# Patient Record
Sex: MALE | Race: WHITE | NOT HISPANIC OR LATINO | Employment: FULL TIME | URBAN - METROPOLITAN AREA
[De-identification: names, ages, dates, MRNs, and addresses within clinical notes are randomized per-mention and may not be internally consistent; named-entity substitution may affect disease eponyms.]

---

## 2021-01-17 ENCOUNTER — IMMUNIZATION (OUTPATIENT)
Dept: VACCINE CLINIC | Facility: HOSPITAL | Age: 46
End: 2021-01-17

## 2021-01-17 PROCEDURE — 91300 HC SARSCOV02 VAC 30MCG/0.3ML IM: CPT | Performed by: INTERNAL MEDICINE

## 2021-01-17 PROCEDURE — 0001A: CPT | Performed by: INTERNAL MEDICINE

## 2021-02-07 ENCOUNTER — IMMUNIZATION (OUTPATIENT)
Dept: VACCINE CLINIC | Facility: HOSPITAL | Age: 46
End: 2021-02-07

## 2021-02-07 PROCEDURE — 91300 HC SARSCOV02 VAC 30MCG/0.3ML IM: CPT | Performed by: INTERNAL MEDICINE

## 2021-02-07 PROCEDURE — 0002A: CPT | Performed by: INTERNAL MEDICINE

## 2021-10-18 ENCOUNTER — PRE-ADMISSION TESTING (OUTPATIENT)
Dept: PREADMISSION TESTING | Facility: HOSPITAL | Age: 46
End: 2021-10-18

## 2021-10-18 VITALS — HEIGHT: 71 IN | WEIGHT: 239.42 LBS | BODY MASS INDEX: 33.52 KG/M2

## 2021-10-18 LAB
ANION GAP SERPL CALCULATED.3IONS-SCNC: 12 MMOL/L (ref 5–15)
APTT PPP: 27.9 SECONDS (ref 22–39)
BUN SERPL-MCNC: 11 MG/DL (ref 6–20)
BUN/CREAT SERPL: 10.2 (ref 7–25)
CALCIUM SPEC-SCNC: 9 MG/DL (ref 8.6–10.5)
CHLORIDE SERPL-SCNC: 101 MMOL/L (ref 98–107)
CO2 SERPL-SCNC: 26 MMOL/L (ref 22–29)
CREAT SERPL-MCNC: 1.08 MG/DL (ref 0.76–1.27)
DEPRECATED RDW RBC AUTO: 46.3 FL (ref 37–54)
ERYTHROCYTE [DISTWIDTH] IN BLOOD BY AUTOMATED COUNT: 18.3 % (ref 12.3–15.4)
GFR SERPL CREATININE-BSD FRML MDRD: 73 ML/MIN/1.73
GFR SERPL CREATININE-BSD FRML MDRD: 89 ML/MIN/1.73
GLUCOSE SERPL-MCNC: 128 MG/DL (ref 65–99)
HCT VFR BLD AUTO: 38.1 % (ref 37.5–51)
HGB BLD-MCNC: 11.5 G/DL (ref 13–17.7)
INR PPP: 0.98 (ref 0.85–1.16)
MCH RBC QN AUTO: 22.2 PG (ref 26.6–33)
MCHC RBC AUTO-ENTMCNC: 30.2 G/DL (ref 31.5–35.7)
MCV RBC AUTO: 73.7 FL (ref 79–97)
PLATELET # BLD AUTO: 367 10*3/MM3 (ref 140–450)
PMV BLD AUTO: 9.7 FL (ref 6–12)
POTASSIUM SERPL-SCNC: 4.6 MMOL/L (ref 3.5–5.2)
PROTHROMBIN TIME: 12.7 SECONDS (ref 11.4–14.4)
QT INTERVAL: 354 MS
QTC INTERVAL: 444 MS
RBC # BLD AUTO: 5.17 10*6/MM3 (ref 4.14–5.8)
SODIUM SERPL-SCNC: 139 MMOL/L (ref 136–145)
WBC # BLD AUTO: 9.02 10*3/MM3 (ref 3.4–10.8)

## 2021-10-18 PROCEDURE — 93005 ELECTROCARDIOGRAM TRACING: CPT

## 2021-10-18 PROCEDURE — 36415 COLL VENOUS BLD VENIPUNCTURE: CPT

## 2021-10-18 PROCEDURE — 93010 ELECTROCARDIOGRAM REPORT: CPT | Performed by: INTERNAL MEDICINE

## 2021-10-18 PROCEDURE — 85027 COMPLETE CBC AUTOMATED: CPT

## 2021-10-18 PROCEDURE — 85610 PROTHROMBIN TIME: CPT

## 2021-10-18 PROCEDURE — 80048 BASIC METABOLIC PNL TOTAL CA: CPT

## 2021-10-18 PROCEDURE — 85730 THROMBOPLASTIN TIME PARTIAL: CPT

## 2021-10-18 RX ORDER — OLMESARTAN MEDOXOMIL 20 MG/1
20 TABLET ORAL DAILY
COMMUNITY
End: 2022-09-09 | Stop reason: CLARIF

## 2021-10-18 RX ORDER — ONDANSETRON 4 MG/1
4 TABLET, FILM COATED ORAL EVERY 8 HOURS PRN
COMMUNITY
End: 2022-09-09

## 2021-10-18 RX ORDER — ALPRAZOLAM 1 MG/1
1 TABLET ORAL 2 TIMES DAILY PRN
COMMUNITY
End: 2022-09-09

## 2021-10-18 RX ORDER — AMLODIPINE BESYLATE 5 MG/1
5 TABLET ORAL DAILY
COMMUNITY
End: 2022-09-09 | Stop reason: CLARIF

## 2021-10-18 NOTE — PAT
Patient to apply Chlorhexadine wipes  to surgical area (as instructed) the night before procedure and the AM of procedure. Wipes provided.    Per Anesthesia Request, patient instructed not to take their ACE/ARB medications on the AM of surgery.    Patient viewed general PAT education video as instructed in their preoperative information received from their surgeon.  Patient stated the general PAT education video was viewed in its entirety and survey completed.  Copies of PAT general education handouts (Incentive Spirometry, Meds to Beds Program, Patient Belongings, Pre-op skin preparation instructions, Blood Glucose testing, Visitor policy, Surgery FAQ, Code H) distributed to patient if not printed. Education related to the PAT pass and skin preparation for surgery (if applicable) completed in PAT as a reinforcement to PAT education video. Patient instructed to return PAT pass provided today as well as completed skin preparation sheet (if applicable) on the day of procedure.     Additionally if patient had not viewed video yet but intended to view it at home or in our waiting area, then referred them to the handout with QR code/link provided during PAT visit.  Instructed patient to complete survey after viewing the video in its entirety.  Encouraged patient/family to read PAT general education handouts thoroughly and notify PAT staff with any questions or concerns. Patient verbalized understanding of all information and priority content.    Positive covid test result on 8-16-21 and on chart

## 2021-10-19 ENCOUNTER — ANESTHESIA EVENT (OUTPATIENT)
Dept: PERIOP | Facility: HOSPITAL | Age: 46
End: 2021-10-19

## 2021-10-20 ENCOUNTER — HOSPITAL ENCOUNTER (OUTPATIENT)
Facility: HOSPITAL | Age: 46
Setting detail: HOSPITAL OUTPATIENT SURGERY
Discharge: HOME | End: 2021-10-20
Attending: SURGERY | Admitting: SURGERY

## 2021-10-20 ENCOUNTER — APPOINTMENT (OUTPATIENT)
Dept: GENERAL RADIOLOGY | Facility: HOSPITAL | Age: 46
End: 2021-10-20

## 2021-10-20 ENCOUNTER — ANESTHESIA (OUTPATIENT)
Dept: PERIOP | Facility: HOSPITAL | Age: 46
End: 2021-10-20

## 2021-10-20 VITALS
BODY MASS INDEX: 33.46 KG/M2 | DIASTOLIC BLOOD PRESSURE: 87 MMHG | WEIGHT: 239 LBS | HEIGHT: 71 IN | HEART RATE: 82 BPM | SYSTOLIC BLOOD PRESSURE: 124 MMHG | OXYGEN SATURATION: 94 % | RESPIRATION RATE: 18 BRPM | TEMPERATURE: 97.4 F

## 2021-10-20 PROCEDURE — 25010000002 HEPARIN (PORCINE) PER 1000 UNITS: Performed by: SURGERY

## 2021-10-20 PROCEDURE — C1753 CATH, INTRAVAS ULTRASOUND: HCPCS | Performed by: SURGERY

## 2021-10-20 PROCEDURE — 0 CEFAZOLIN IN DEXTROSE 2-4 GM/100ML-% SOLUTION: Performed by: SURGERY

## 2021-10-20 PROCEDURE — 25010000002 PROPOFOL 10 MG/ML EMULSION: Performed by: NURSE ANESTHETIST, CERTIFIED REGISTERED

## 2021-10-20 PROCEDURE — 25010000003 CEFAZOLIN IN DEXTROSE 2-4 GM/100ML-% SOLUTION: Performed by: SURGERY

## 2021-10-20 PROCEDURE — C1894 INTRO/SHEATH, NON-LASER: HCPCS | Performed by: SURGERY

## 2021-10-20 PROCEDURE — C1769 GUIDE WIRE: HCPCS | Performed by: SURGERY

## 2021-10-20 PROCEDURE — 25010000002 IOPAMIDOL 61 % SOLUTION: Performed by: SURGERY

## 2021-10-20 PROCEDURE — 25010000002 FENTANYL CITRATE (PF) 50 MCG/ML SOLUTION: Performed by: NURSE ANESTHETIST, CERTIFIED REGISTERED

## 2021-10-20 PROCEDURE — C1725 CATH, TRANSLUMIN NON-LASER: HCPCS | Performed by: SURGERY

## 2021-10-20 PROCEDURE — 75820 VEIN X-RAY ARM/LEG: CPT

## 2021-10-20 PROCEDURE — C1757 CATH, THROMBECTOMY/EMBOLECT: HCPCS | Performed by: SURGERY

## 2021-10-20 PROCEDURE — 25010000002 HYDROMORPHONE PER 4 MG: Performed by: NURSE ANESTHETIST, CERTIFIED REGISTERED

## 2021-10-20 PROCEDURE — 25010000002 HEPARIN (PORCINE) PER 1000 UNITS: Performed by: NURSE ANESTHETIST, CERTIFIED REGISTERED

## 2021-10-20 PROCEDURE — 25010000002 MIDAZOLAM PER 1 MG: Performed by: ANESTHESIOLOGY

## 2021-10-20 RX ORDER — HYDROMORPHONE HYDROCHLORIDE 1 MG/ML
0.5 INJECTION, SOLUTION INTRAMUSCULAR; INTRAVENOUS; SUBCUTANEOUS
Status: DISCONTINUED | OUTPATIENT
Start: 2021-10-20 | End: 2021-10-20 | Stop reason: HOSPADM

## 2021-10-20 RX ORDER — FAMOTIDINE 10 MG/ML
20 INJECTION, SOLUTION INTRAVENOUS ONCE
Status: DISCONTINUED | OUTPATIENT
Start: 2021-10-20 | End: 2021-10-20 | Stop reason: HOSPADM

## 2021-10-20 RX ORDER — SODIUM CHLORIDE 0.9 % (FLUSH) 0.9 %
10 SYRINGE (ML) INJECTION AS NEEDED
Status: CANCELLED | OUTPATIENT
Start: 2021-10-20

## 2021-10-20 RX ORDER — SODIUM CHLORIDE 0.9 % (FLUSH) 0.9 %
10 SYRINGE (ML) INJECTION AS NEEDED
Status: DISCONTINUED | OUTPATIENT
Start: 2021-10-20 | End: 2021-10-20 | Stop reason: HOSPADM

## 2021-10-20 RX ORDER — FENTANYL CITRATE 50 UG/ML
INJECTION, SOLUTION INTRAMUSCULAR; INTRAVENOUS
Status: DISCONTINUED
Start: 2021-10-20 | End: 2021-10-20 | Stop reason: HOSPADM

## 2021-10-20 RX ORDER — LIDOCAINE HYDROCHLORIDE 10 MG/ML
INJECTION, SOLUTION EPIDURAL; INFILTRATION; INTRACAUDAL; PERINEURAL AS NEEDED
Status: DISCONTINUED | OUTPATIENT
Start: 2021-10-20 | End: 2021-10-20 | Stop reason: HOSPADM

## 2021-10-20 RX ORDER — ACETAMINOPHEN 325 MG/1
650 TABLET ORAL EVERY 4 HOURS PRN
Status: CANCELLED | OUTPATIENT
Start: 2021-10-20

## 2021-10-20 RX ORDER — PROPOFOL 10 MG/ML
VIAL (ML) INTRAVENOUS AS NEEDED
Status: DISCONTINUED | OUTPATIENT
Start: 2021-10-20 | End: 2021-10-20 | Stop reason: SURG

## 2021-10-20 RX ORDER — ONDANSETRON 2 MG/ML
4 INJECTION INTRAMUSCULAR; INTRAVENOUS ONCE AS NEEDED
Status: DISCONTINUED | OUTPATIENT
Start: 2021-10-20 | End: 2021-10-20 | Stop reason: HOSPADM

## 2021-10-20 RX ORDER — LIDOCAINE HYDROCHLORIDE 10 MG/ML
0.5 INJECTION, SOLUTION EPIDURAL; INFILTRATION; INTRACAUDAL; PERINEURAL ONCE AS NEEDED
Status: COMPLETED | OUTPATIENT
Start: 2021-10-20 | End: 2021-10-20

## 2021-10-20 RX ORDER — DROPERIDOL 2.5 MG/ML
0.62 INJECTION, SOLUTION INTRAMUSCULAR; INTRAVENOUS ONCE AS NEEDED
Status: DISCONTINUED | OUTPATIENT
Start: 2021-10-20 | End: 2021-10-20 | Stop reason: HOSPADM

## 2021-10-20 RX ORDER — SODIUM CHLORIDE 0.9 % (FLUSH) 0.9 %
3 SYRINGE (ML) INJECTION EVERY 12 HOURS SCHEDULED
Status: CANCELLED | OUTPATIENT
Start: 2021-10-20

## 2021-10-20 RX ORDER — FENTANYL CITRATE 50 UG/ML
INJECTION, SOLUTION INTRAMUSCULAR; INTRAVENOUS AS NEEDED
Status: DISCONTINUED | OUTPATIENT
Start: 2021-10-20 | End: 2021-10-20 | Stop reason: SURG

## 2021-10-20 RX ORDER — HYDROCODONE BITARTRATE AND ACETAMINOPHEN 5; 325 MG/1; MG/1
1 TABLET ORAL EVERY 4 HOURS PRN
Status: CANCELLED | OUTPATIENT
Start: 2021-10-20 | End: 2021-10-27

## 2021-10-20 RX ORDER — ALBUTEROL SULFATE 2.5 MG/3ML
2.5 SOLUTION RESPIRATORY (INHALATION) ONCE AS NEEDED
Status: DISCONTINUED | OUTPATIENT
Start: 2021-10-20 | End: 2021-10-20 | Stop reason: HOSPADM

## 2021-10-20 RX ORDER — CEFAZOLIN SODIUM 2 G/100ML
2 INJECTION, SOLUTION INTRAVENOUS ONCE
Status: COMPLETED | OUTPATIENT
Start: 2021-10-20 | End: 2021-10-20

## 2021-10-20 RX ORDER — HEPARIN SODIUM 1000 [USP'U]/ML
INJECTION, SOLUTION INTRAVENOUS; SUBCUTANEOUS AS NEEDED
Status: DISCONTINUED | OUTPATIENT
Start: 2021-10-20 | End: 2021-10-20 | Stop reason: SURG

## 2021-10-20 RX ORDER — SODIUM CHLORIDE 0.9 % (FLUSH) 0.9 %
10 SYRINGE (ML) INJECTION EVERY 12 HOURS SCHEDULED
Status: DISCONTINUED | OUTPATIENT
Start: 2021-10-20 | End: 2021-10-20 | Stop reason: HOSPADM

## 2021-10-20 RX ORDER — FENTANYL CITRATE 50 UG/ML
50 INJECTION, SOLUTION INTRAMUSCULAR; INTRAVENOUS
Status: DISCONTINUED | OUTPATIENT
Start: 2021-10-20 | End: 2021-10-20 | Stop reason: HOSPADM

## 2021-10-20 RX ORDER — SODIUM CHLORIDE, SODIUM LACTATE, POTASSIUM CHLORIDE, CALCIUM CHLORIDE 600; 310; 30; 20 MG/100ML; MG/100ML; MG/100ML; MG/100ML
9 INJECTION, SOLUTION INTRAVENOUS CONTINUOUS
Status: DISCONTINUED | OUTPATIENT
Start: 2021-10-20 | End: 2021-10-20 | Stop reason: HOSPADM

## 2021-10-20 RX ORDER — LIDOCAINE HYDROCHLORIDE 10 MG/ML
INJECTION, SOLUTION EPIDURAL; INFILTRATION; INTRACAUDAL; PERINEURAL AS NEEDED
Status: DISCONTINUED | OUTPATIENT
Start: 2021-10-20 | End: 2021-10-20 | Stop reason: SURG

## 2021-10-20 RX ORDER — FAMOTIDINE 20 MG/1
20 TABLET, FILM COATED ORAL ONCE
Status: COMPLETED | OUTPATIENT
Start: 2021-10-20 | End: 2021-10-20

## 2021-10-20 RX ORDER — MIDAZOLAM HYDROCHLORIDE 1 MG/ML
1 INJECTION INTRAMUSCULAR; INTRAVENOUS
Status: COMPLETED | OUTPATIENT
Start: 2021-10-20 | End: 2021-10-20

## 2021-10-20 RX ADMIN — HEPARIN SODIUM 8000 UNITS: 1000 INJECTION, SOLUTION INTRAVENOUS; SUBCUTANEOUS at 08:36

## 2021-10-20 RX ADMIN — SODIUM CHLORIDE, POTASSIUM CHLORIDE, SODIUM LACTATE AND CALCIUM CHLORIDE 9 ML/HR: 600; 310; 30; 20 INJECTION, SOLUTION INTRAVENOUS at 07:21

## 2021-10-20 RX ADMIN — CEFAZOLIN SODIUM 2 G: 2 INJECTION, SOLUTION INTRAVENOUS at 08:13

## 2021-10-20 RX ADMIN — SODIUM CHLORIDE, POTASSIUM CHLORIDE, SODIUM LACTATE AND CALCIUM CHLORIDE: 600; 310; 30; 20 INJECTION, SOLUTION INTRAVENOUS at 08:58

## 2021-10-20 RX ADMIN — LIDOCAINE HYDROCHLORIDE 50 MG: 10 INJECTION, SOLUTION EPIDURAL; INFILTRATION; INTRACAUDAL; PERINEURAL at 08:13

## 2021-10-20 RX ADMIN — FAMOTIDINE 20 MG: 20 TABLET ORAL at 07:22

## 2021-10-20 RX ADMIN — PROPOFOL 100 MG: 10 INJECTION, EMULSION INTRAVENOUS at 08:13

## 2021-10-20 RX ADMIN — FENTANYL CITRATE 100 MCG: 50 INJECTION, SOLUTION INTRAMUSCULAR; INTRAVENOUS at 08:13

## 2021-10-20 RX ADMIN — HYDROMORPHONE HYDROCHLORIDE 0.5 MG: 1 INJECTION, SOLUTION INTRAMUSCULAR; INTRAVENOUS; SUBCUTANEOUS at 09:41

## 2021-10-20 RX ADMIN — MIDAZOLAM HYDROCHLORIDE 1 MG: 1 INJECTION, SOLUTION INTRAMUSCULAR; INTRAVENOUS at 07:32

## 2021-10-20 RX ADMIN — PROPOFOL 200 MCG/KG/MIN: 10 INJECTION, EMULSION INTRAVENOUS at 08:13

## 2021-10-20 RX ADMIN — MIDAZOLAM HYDROCHLORIDE 1 MG: 1 INJECTION, SOLUTION INTRAMUSCULAR; INTRAVENOUS at 07:22

## 2021-10-20 RX ADMIN — LIDOCAINE HYDROCHLORIDE 0.5 ML: 10 INJECTION, SOLUTION EPIDURAL; INFILTRATION; INTRACAUDAL; PERINEURAL at 07:21

## 2021-10-20 RX ADMIN — FENTANYL CITRATE 50 MCG: 50 INJECTION INTRAMUSCULAR; INTRAVENOUS at 09:52

## 2021-10-20 NOTE — ANESTHESIA PREPROCEDURE EVALUATION
Anesthesia Evaluation                  Airway   Mallampati: II  Dental      Pulmonary    Cardiovascular     (+) hypertension,       Neuro/Psych  GI/Hepatic/Renal/Endo      Musculoskeletal     Abdominal    Substance History      OB/GYN          Other                        Anesthesia Plan    ASA 2     general     intravenous induction     Anesthetic plan, all risks, benefits, and alternatives have been provided, discussed and informed consent has been obtained with: patient.

## 2021-10-20 NOTE — DISCHARGE INSTRUCTIONS
Keep left leg wrap on for 48 hours and then wear compression stockings.      Resume Eliquis tonight

## 2021-10-20 NOTE — ANESTHESIA POSTPROCEDURE EVALUATION
Patient: Terrence James    Procedure Summary     Date: 10/20/21 Room / Location: [facility] OR 02 / [facility] HYBRID [facility]    Anesthesia Start: 0808 Anesthesia Stop:     Procedure: ULTRASOUND GUIDED ACCESS; LEFT LEG VENOGRAM; INTRAVENOUS ULTRASOUND OF FEMORAL VEIN, POPLITEAL VEIN, EXTERNAL AND COMMON ILIAC VEINS; LEFT POPLITEAL VEIN ANGIOPLASTY; LEFT COMMON ILIAC VEIN ANGIOPLASTY; POPLITEAL AND FEMORAL VEIN THROMBECTOMY - LEFT FLUORO:3 MIN 48 SEC DOSE: 45 MGY CONTRAST: 50 ML ISOVUE (Left Thigh) Diagnosis:     Surgeons: Arnel Gordon MD Provider: Cliff Walker MD    Anesthesia Type: general ASA Status: 2          Anesthesia Type: general    Vitals  No vitals data found for the desired time range.          Post Anesthesia Care and Evaluation    Patient location during evaluation: PACU  Patient participation: complete - patient participated  Level of consciousness: awake  Pain score: 0  Pain management: adequate  Airway patency: patent  Anesthetic complications: No anesthetic complications  PONV Status: none  Cardiovascular status: acceptable and stable  Respiratory status: nasal cannula, unassisted, acceptable and spontaneous ventilation  Hydration status: acceptable

## 2021-10-20 NOTE — OP NOTE
FEMORAL THROMBECTOMY/EMBOLECTOMY  Procedure Report    Patient Name:  Terrence James  YOB: 1975    Date of Surgery:  10/20/2021     Indications: This patient has developed a left leg DVT secondary to COVID-19 hypercoagulability.  He demonstrated propagation of thrombus despite anticoagulation with symptoms of severe swelling of the left calf.  Has been offered mechanical thrombectomy for propagation of thrombus and symptoms.    Pre-op Diagnosis:   Left leg DVT with propagation of thrombus       Post-Op Diagnosis Codes:  Left leg DVT and left common iliac vein moderate compression    Procedure/CPT® Codes:      Procedure(s):  ULTRASOUND GUIDED ACCESS; LEFT LEG VENOGRAM; INTRAVENOUS ULTRASOUND OF FEMORAL VEIN, POPLITEAL VEIN, EXTERNAL AND COMMON ILIAC VEINS; LEFT POPLITEAL VEIN ANGIOPLASTY; LEFT COMMON ILIAC VEIN ANGIOPLASTY; POPLITEAL AND FEMORAL VEIN THROMBECTOMY - LEFT FLUORO:3 MIN 48 SEC DOSE: 45 MGY CONTRAST: 50 ML ISOVUE    Staff:  Surgeon(s):  Arnel Gordon MD    Circulator: Daisy Kilpatrick RN  Radiology Technologist: Megan Paiz RT  Scrub Person: Michael Womack             Anesthesia: Monitored Anesthesia Care    Estimated Blood Loss: minimal    Implants:    Nothing was implanted during the procedure    Specimen:          None        Findings:    1.  Access within the left gastroc vein with mechanical thrombectomy and gastroc popliteal and superficial femoral vein    2.  Thrombus was very adherent and appeared to be more subacute and did not fully resolve following mechanical thrombectomy.  10 mm balloon angioplasty of the left popliteal vein was able to expand flow channel.    3.  Intravascular ultrasound of the left superficial femoral and popliteal vein demonstrated subacute thrombus.  Left common femoral, external iliac were patent without evidence of thrombus.  The left common iliac vein had evidence of moderate external compression treated with 16mm balloon  angioplasty.    Complications: None    Description of Procedure:    This patient was taken back to the operating room placed in the supine position on operating table.  Following IV sedation bilateral groins and left leg were widely prepped and draped in standard sterile fashion.  Timeout was taken with identification of the patient and procedure.  Under ultrasound guidance the left gastrocnemius vein was accessed adjacent to the junction of the popliteal vein.  Left leg venogram demonstrated nearly occlusive thrombus within the left popliteal junction.  This was crossed successfully with a Glidewire advantage and heparin was administered.  12 Icelandic sheath was next placed.  12 Icelandic penumbra thrombectomy device was used.  Multiple passes were made with only mild amount of thrombus retrieved.  Intravascular ultrasound was used to evaluate the popliteal artery.  The stent demonstrated more of a chronic appearing or subacute appearing thrombus on the popliteal and proximal superficial femoral vein.  The common femoral vein external iliac common iliac vein demonstrated no evidence of thrombus.  Common iliac vein did have evidence of external compression.    10 mm balloon angioplasty of the popliteal vein was performed.  This was in hopes of freeing up the more chronic appearing thrombus for aspiration thrombectomy.  This was again attempted with the penumbra thrombectomy device.  16mm balloon angioplasty was performed of the common iliac vein.  Completion venography demonstrated improvements of flow within the previously occluded popliteal vein.  No abnormalities were noted within the femoral or iliac venous outflow into the vena cava.  12 Icelandic sheath was removed and compressive dressing was applied.        Arnel Gordon MD     Date: 10/20/2021  Time: 09:27 EDT

## 2021-10-20 NOTE — H&P
"Pre-Op H&P  Terrence James  [id number]  1975    Chief complaint: Left leg DVT    HPI:    Patient is a 46 y.o.male who presents with a left leg DVT. Conservative treatment has failed to provide significant relief. Surgical intervention is recommended and he is agreeable. He is here today for a left popliteal vein thrombectomy.    Review of Systems:  General ROS: negative for chills, fever or skin lesions;  No changes since last office visit.  Neg for recent sick exposure  Cardiovascular ROS: no chest pain or dyspnea on exertion; +HTN  Respiratory ROS: no cough, shortness of breath, or wheezing; history of COVID-19 Aug. 2021, former cigarette smoker (/ ppd x 10 years)- quit     Allergies: No Known Allergies    Home Meds:    No current facility-administered medications on file prior to encounter.     No current outpatient medications on file prior to encounter.       PMH:   Past Medical History:   Diagnosis Date   • Anxiety    • COVID-19 2021   • DVT (deep venous thrombosis) (HCC)     LEFT LEG    • Hypertension    • Wears reading eyeglasses      PSH:    Past Surgical History:   Procedure Laterality Date   • CARDIAC CATHETERIZATION  2019   • CORONARY STENT PLACEMENT  2019   • LUMBAR DISCECTOMY  2006    L3-4   • VASECTOMY         Social History:   Tobacco:   Social History     Tobacco Use   Smoking Status Former Smoker   • Packs/day: 0.25   • Years: 10.00   • Pack years: 2.50   • Types: Cigarettes   • Quit date:    • Years since quittin.8   Smokeless Tobacco Never Used      Alcohol:     Social History     Substance and Sexual Activity   Alcohol Use None    Comment: ~2-3 DRINK WEEKLY        Vitals:           /84 (BP Location: Right arm, Patient Position: Lying)   Pulse 85   Temp 97.3 °F (36.3 °C) (Temporal)   Resp 18   Ht 180.3 cm (71\")   Wt 108 kg (239 lb)   SpO2 97%   BMI 33.33 kg/m²     Physical Exam:  General Appearance:    Alert, cooperative, no distress, appears stated " age   Head:    Normocephalic, without obvious abnormality, atraumatic   Lungs:     Clear to auscultation bilaterally, respirations unlabored    Heart:   Regular rate and rhythm, S1 and S2 normal, no murmur, rub    or gallop    Abdomen:    Soft, non-tender, +bowel sounds   Breast Exam:    deferred   Genitalia:    deferred   Extremities:   Extremities normal, atraumatic, no cyanosis or edema   Skin:   Skin color, texture, turgor normal, no rashes or lesions   Neurologic:   Grossly intact   Results Review  LABS:  Lab Results   Component Value Date    WBC 9.02 10/18/2021    HGB 11.5 (L) 10/18/2021    HCT 38.1 10/18/2021    MCV 73.7 (L) 10/18/2021     10/18/2021    GLUCOSE 128 (H) 10/18/2021    BUN 11 10/18/2021    CREATININE 1.08 10/18/2021    EGFRIFNONA 73 10/18/2021    EGFRIFAFRI 89 10/18/2021     10/18/2021    K 4.6 10/18/2021     10/18/2021    CO2 26.0 10/18/2021    CALCIUM 9.0 10/18/2021    PTT 27.9 10/18/2021    INR 0.98 10/18/2021       RADIOLOGY:  No radiology results for the last 3 days     I reviewed the patient's new clinical results.    Cancer Staging (if applicable)  Cancer Patient: __ yes _X_no __unknown; If yes, clinical stage T:__ N:__M:__, stage group or __N/A    Impression: Left leg DVT    Plan: Left popliteal and femoral vein thrombectomy      Francia Michel, APRN   10/20/21   7:28 AM EDT

## 2022-09-09 ENCOUNTER — OFFICE VISIT (OUTPATIENT)
Dept: FAMILY MEDICINE CLINIC | Facility: CLINIC | Age: 47
End: 2022-09-09

## 2022-09-09 VITALS
SYSTOLIC BLOOD PRESSURE: 162 MMHG | TEMPERATURE: 98 F | HEART RATE: 96 BPM | OXYGEN SATURATION: 97 % | WEIGHT: 235 LBS | BODY MASS INDEX: 32.9 KG/M2 | DIASTOLIC BLOOD PRESSURE: 110 MMHG | HEIGHT: 71 IN

## 2022-09-09 DIAGNOSIS — F32.1 CURRENT MODERATE EPISODE OF MAJOR DEPRESSIVE DISORDER WITHOUT PRIOR EPISODE: ICD-10-CM

## 2022-09-09 DIAGNOSIS — Z00.00 WELL ADULT EXAM: Primary | ICD-10-CM

## 2022-09-09 DIAGNOSIS — E55.9 VITAMIN D DEFICIENCY: ICD-10-CM

## 2022-09-09 DIAGNOSIS — F41.9 ANXIETY: ICD-10-CM

## 2022-09-09 DIAGNOSIS — Z12.11 COLON CANCER SCREENING: ICD-10-CM

## 2022-09-09 DIAGNOSIS — I10 PRIMARY HYPERTENSION: ICD-10-CM

## 2022-09-09 PROBLEM — U07.1 COVID-19: Status: RESOLVED | Noted: 2021-08-01 | Resolved: 2022-09-09

## 2022-09-09 PROBLEM — H93.13 TINNITUS AURIUM, BILATERAL: Status: ACTIVE | Noted: 2021-08-01

## 2022-09-09 PROCEDURE — 99386 PREV VISIT NEW AGE 40-64: CPT | Performed by: FAMILY MEDICINE

## 2022-09-09 PROCEDURE — 99214 OFFICE O/P EST MOD 30 MIN: CPT | Performed by: FAMILY MEDICINE

## 2022-09-09 RX ORDER — HYDROXYZINE HYDROCHLORIDE 25 MG/1
25 TABLET, FILM COATED ORAL EVERY 6 HOURS PRN
Qty: 100 TABLET | Refills: 5 | Status: SHIPPED | OUTPATIENT
Start: 2022-09-09

## 2022-09-09 RX ORDER — VENLAFAXINE HYDROCHLORIDE 37.5 MG/1
37.5 CAPSULE, EXTENDED RELEASE ORAL DAILY
Qty: 30 CAPSULE | Refills: 5 | Status: SHIPPED | OUTPATIENT
Start: 2022-09-09

## 2022-09-09 RX ORDER — AMLODIPINE AND VALSARTAN 5; 160 MG/1; MG/1
1 TABLET ORAL DAILY
Qty: 30 TABLET | Refills: 11 | Status: SHIPPED | OUTPATIENT
Start: 2022-09-09 | End: 2023-09-09

## 2022-09-09 NOTE — PROGRESS NOTES
Terrence James is a 47 y.o. male who presents today to establish care.    Chief Complaint   Patient presents with   • Annual Exam     Est care         Patient is here for annual exam and to establish care. He has had a very stressful last 2 years due to moving from [place], losing his job, going through a divorce, and starting new job. He was taking exforge in the past for blood pressure. He has also taken metoprolol in the past. He has not had blood pressure medication since January. He does not check his blood pressure at home. He has not had blood work in a couple years. He has a DVT last year thought to be 2/2 COVID. He underwent vascular surgery to open up the vein in October. He is not taking eliquis currently. He took eliquis from August to January but could no longer afford it. He struggles with depression, anxiety, and difficulty sleeping. He took xanax in the past but no other medication. He took hydroxyzine from the hospital which helped anxiety. Patient exercises by walking and occasionally riding pelaton. He eats a varied and mostly healthy diet. He does not eat large portions due to gastric sleeve. He has not seen dentist in years. He saw optometry last year. He has seen dermatology in the past but not in several years.        Review of Systems   Constitutional: Negative for fever and unexpected weight loss.   HENT: Negative for congestion, ear pain and sore throat.    Eyes: Negative for visual disturbance.   Respiratory: Negative for cough, shortness of breath and wheezing.    Cardiovascular: Negative for chest pain and palpitations.   Gastrointestinal: Negative for abdominal pain, blood in stool, constipation, diarrhea, nausea, vomiting and GERD.   Endocrine: Negative for polydipsia and polyuria.   Genitourinary: Negative for difficulty urinating.   Musculoskeletal: Negative for joint swelling.   Skin: Negative for rash and skin lesions.   Allergic/Immunologic: Negative for environmental allergies.    Neurological: Negative for seizures and syncope.   Hematological: Does not bruise/bleed easily.   Psychiatric/Behavioral: Positive for decreased concentration, dysphoric mood, depressed mood and stress. Negative for self-injury, sleep disturbance and suicidal ideas. The patient is nervous/anxious.         PHQ-9 Depression Screening  Little interest or pleasure in doing things? 3-->nearly every day   Feeling down, depressed, or hopeless? 2-->more than half the days   Trouble falling or staying asleep, or sleeping too much? 3-->nearly every day   Feeling tired or having little energy? 3-->nearly every day   Poor appetite or overeating? 3-->nearly every day   Feeling bad about yourself - or that you are a failure or have let yourself or your family down? 3-->nearly every day   Trouble concentrating on things, such as reading the newspaper or watching television? 0-->not at all   Moving or speaking so slowly that other people could have noticed? Or the opposite - being so fidgety or restless that you have been moving around a lot more than usual? 3-->nearly every day   Thoughts that you would be better off dead, or of hurting yourself in some way? 0-->not at all   PHQ-9 Total Score 20   If you checked off any problems, how difficult have these problems made it for you to do your work, take care of things at home, or get along with other people? very difficult     VIVIAN-7  Over the last two weeks, how often have you been bothered by the following problems?  Feeling nervous, anxious or on edge: 3  Not being able to stop or control worrying: 3  Worrying too much about different things: 3  Trouble Relaxing: 3  Being so restless that it is hard to sit still: 3  Becoming easily annoyed or irritable: 0  Feeling afraid as if something awful might happen: 0  VIVIAN 7 Total Score: 15  If you checked any problems, how difficult have these problems made it for you to do your work, take care of things at home, or get along with other  people: Very difficult        Past Medical History:   Diagnosis Date   • Anxiety    • COVID-19 2021   • DVT (deep venous thrombosis) (HCC)     LEFT LEG    • Hypertension    • Wears reading eyeglasses         Past Surgical History:   Procedure Laterality Date   • CARDIAC CATHETERIZATION  2019   • CORONARY STENT PLACEMENT  2019   • EAR TUBES Bilateral    • FEMORAL THROMBECTOMY/EMBOLECTOMY Left 10/20/2021    Procedure: ULTRASOUND GUIDED ACCESS; LEFT LEG VENOGRAM; INTRAVENOUS ULTRASOUND OF FEMORAL VEIN, POPLITEAL VEIN, EXTERNAL AND COMMON ILIAC VEINS; LEFT POPLITEAL VEIN ANGIOPLASTY; LEFT COMMON ILIAC VEIN ANGIOPLASTY; POPLITEAL AND FEMORAL VEIN THROMBECTOMY - LEFT;  Surgeon: Arnel Gordon MD;  Location: [facility];  Service: Vascular;  Laterality: Left;  FLUORO:3 MIN 48 SEC  DOSE: 45 MGY  CONTRAS   • GASTRIC SLEEVE LAPAROSCOPIC  2014   • LUMBAR DISCECTOMY      L3-4   • VASECTOMY          Family History   Problem Relation Age of Onset   • Vision loss Mother    • Diabetes Mother    • Kidney disease Mother    • Skin cancer Mother         squamous cell   • Heart attack Father    • No Known Problems Sister    • No Known Problems Sister    • No Known Problems Brother    • Other Maternal Grandmother         unknown   • Other Maternal Grandfather         unknown   • Other Paternal Grandmother         unknown   • Other Paternal Grandfather         unknown   • No Known Problems Daughter    • No Known Problems Son         Social History     Socioeconomic History   • Marital status: Single   Tobacco Use   • Smoking status: Former Smoker     Packs/day: 0.25     Years: 10.00     Pack years: 2.50     Types: Cigarettes     Start date:      Quit date:      Years since quittin.6   • Smokeless tobacco: Never Used   Vaping Use   • Vaping Use: Never used   Substance and Sexual Activity   • Alcohol use: Yes     Comment: ~2-3 DRINK WEEKLY    • Drug use: Never   • Sexual activity: Yes     Partners: Female  "    Comment: 5-6 female partners in the last year.        Current Outpatient Medications on File Prior to Visit   Medication Sig Dispense Refill   • Cholecalciferol (VITAMIN D3 PO) Take 1 tablet by mouth Daily.     • [DISCONTINUED] ALPRAZolam (XANAX) 1 MG tablet Take 1 tablet by mouth 2 (Two) Times a Day As Needed (ANXIETY).     • [DISCONTINUED] amLODIPine (NORVASC) 5 MG tablet Take 5 mg by mouth Daily.     • [DISCONTINUED] apixaban (ELIQUIS) 5 MG tablet tablet Take 5 mg by mouth 2 (Two) Times a Day.     • [DISCONTINUED] FOLIC ACID PO Take 1 tablet by mouth Daily.     • [DISCONTINUED] olmesartan (BENICAR) 20 MG tablet Take 20 mg by mouth Daily.     • [DISCONTINUED] ondansetron (ZOFRAN) 4 MG tablet Take 4 mg by mouth Every 8 (Eight) Hours As Needed for Nausea or Vomiting.       No current facility-administered medications on file prior to visit.       No Known Allergies     Visit Vitals  BP (!) 162/110   Pulse 96   Temp 98 °F (36.7 °C)   Ht 180.3 cm (71\")   Wt 107 kg (235 lb)   SpO2 97%   BMI 32.78 kg/m²      Body mass index is 32.78 kg/m².    Physical Exam  Constitutional:       General: He is not in acute distress.     Appearance: He is well-developed. He is not diaphoretic.   HENT:      Head: Atraumatic.   Cardiovascular:      Rate and Rhythm: Normal rate and regular rhythm.      Heart sounds: Normal heart sounds. No murmur heard.    No friction rub. No gallop.   Pulmonary:      Effort: Pulmonary effort is normal. No respiratory distress.      Breath sounds: Normal breath sounds. No stridor. No wheezing, rhonchi or rales.   Abdominal:      General: Bowel sounds are normal. There is no distension.      Palpations: Abdomen is soft. There is no mass.      Tenderness: There is no abdominal tenderness. There is no guarding or rebound.      Hernia: No hernia is present.   Musculoskeletal:      Cervical back: Normal range of motion and neck supple.   Skin:     General: Skin is warm and dry.   Neurological:      Mental " Status: He is alert and oriented to person, place, and time.   Psychiatric:         Behavior: Behavior normal.          Results for orders placed or performed in visit on 10/18/21   CBC (No Diff)    Specimen: Blood   Result Value Ref Range    WBC 9.02 3.40 - 10.80 10*3/mm3    RBC 5.17 4.14 - 5.80 10*6/mm3    Hemoglobin 11.5 (L) 13.0 - 17.7 g/dL    Hematocrit 38.1 37.5 - 51.0 %    MCV 73.7 (L) 79.0 - 97.0 fL    MCH 22.2 (L) 26.6 - 33.0 pg    MCHC 30.2 (L) 31.5 - 35.7 g/dL    RDW 18.3 (H) 12.3 - 15.4 %    RDW-SD 46.3 37.0 - 54.0 fl    MPV 9.7 6.0 - 12.0 fL    Platelets 367 140 - 450 10*3/mm3   Basic Metabolic Panel    Specimen: Blood   Result Value Ref Range    Glucose 128 (H) 65 - 99 mg/dL    BUN 11 6 - 20 mg/dL    Creatinine 1.08 0.76 - 1.27 mg/dL    Sodium 139 136 - 145 mmol/L    Potassium 4.6 3.5 - 5.2 mmol/L    Chloride 101 98 - 107 mmol/L    CO2 26.0 22.0 - 29.0 mmol/L    Calcium 9.0 8.6 - 10.5 mg/dL    eGFR  African Amer 89 >60 mL/min/1.73    eGFR Non African Amer 73 >60 mL/min/1.73    BUN/Creatinine Ratio 10.2 7.0 - 25.0    Anion Gap 12.0 5.0 - 15.0 mmol/L   aPTT    Specimen: Blood   Result Value Ref Range    PTT 27.9 22.0 - 39.0 seconds   Protime-INR    Specimen: Blood   Result Value Ref Range    Protime 12.7 11.4 - 14.4 Seconds    INR 0.98 0.85 - 1.16   ECG 12 Lead   Result Value Ref Range    QT Interval 354 ms    QTC Interval 444 ms        Problems Addressed this Visit        Cardiac and Vasculature    Hypertension     Hypertension is Worsening off of medications.  Dietary sodium restriction.  Weight loss.  Regular aerobic exercise.  Medication changes per orders.  Patient was restarted on Exforge as well as metoprolol.  Ambulatory blood pressure monitoring.  Blood pressure will be reassessed 8 weeks.         Relevant Medications    amLODIPine-valsartan (EXFORGE) 5-160 MG per tablet    metoprolol tartrate (LOPRESSOR) 25 MG tablet       Endocrine and Metabolic    Vitamin D deficiency    Relevant Orders     Vitamin D 25 Hydroxy       Gastrointestinal Abdominal     Colon cancer screening    Relevant Orders    Ambulatory Referral For Screening Colonoscopy       Health Encounters    Well adult exam - Primary     The patient is here for health maintenance visit.  Currently, the patient consumes a healthy diet and has an inadequate exercise regimen.  Screening lab work is ordered.  Immunizations were reviewed today.  Advice and education was given regarding nutrition, aerobic exercise, routine dental evaluations, routine eye exams, reproductive health, cardiovascular risk reduction, sunscreen use, self skin examination (annual dermatology evaluations) and seatbelt use (general overall safety).  Further recommendations will be given if needed after lab evaluation.  Annual wellness evaluation is recommended.           Relevant Orders    Comprehensive Metabolic Panel    CBC & Differential    TSH Rfx On Abnormal To Free T4    Lipid Panel    Hemoglobin A1c    Vitamin D 25 Hydroxy       Mental Health    Anxiety     Chronic and worsening.  Patient is not currently on any medications.  Patient was started on Effexor to take daily and hydroxyzine to use as needed.         Relevant Medications    hydrOXYzine (ATARAX) 25 MG tablet    venlafaxine XR (EFFEXOR-XR) 37.5 MG 24 hr capsule    Current moderate episode of major depressive disorder without prior episode (HCC)     Patient's depression is single episode and is moderate without psychosis. Their depression is currently active and the condition is newly identified. This will be reassessed 8 weeks. F/U as described:patient was prescribed an antidepressant medicine.  RTC/ED precautions given.         Relevant Medications    hydrOXYzine (ATARAX) 25 MG tablet    venlafaxine XR (EFFEXOR-XR) 37.5 MG 24 hr capsule      Diagnoses       Codes Comments    Well adult exam    -  Primary ICD-10-CM: Z00.00  ICD-9-CM: V70.0     Primary hypertension     ICD-10-CM: I10  ICD-9-CM: 401.9     Colon  cancer screening     ICD-10-CM: Z12.11  ICD-9-CM: V76.51     Vitamin D deficiency     ICD-10-CM: E55.9  ICD-9-CM: 268.9     Anxiety     ICD-10-CM: F41.9  ICD-9-CM: 300.00     Current moderate episode of major depressive disorder without prior episode (HCC)     ICD-10-CM: F32.1  ICD-9-CM: 296.22           Return in about 8 weeks (around 11/4/2022) for Follow-up HTN, depression, anxiety.    Pino Borrero MD  9/9/2022

## 2022-09-09 NOTE — PATIENT INSTRUCTIONS
"Hypertension, Adult  High blood pressure (hypertension) is when the force of blood pumping through the arteries is too strong. The arteries are the blood vessels that carry blood from the heart throughout the body. Hypertension forces the heart to work harder to pump blood and may cause arteries to become narrow or stiff. Untreated or uncontrolled hypertension can cause a heart attack, heart failure, a stroke, kidney disease, and other problems.  A blood pressure reading consists of a higher number over a lower number. Ideally, your blood pressure should be below 120/80. The first (\"top\") number is called the systolic pressure. It is a measure of the pressure in your arteries as your heart beats. The second (\"bottom\") number is called the diastolic pressure. It is a measure of the pressure in your arteries as the heart relaxes.  What are the causes?  The exact cause of this condition is not known. There are some conditions that result in or are related to high blood pressure.  What increases the risk?  Some risk factors for high blood pressure are under your control. The following factors may make you more likely to develop this condition:  Smoking.  Having type 2 diabetes mellitus, high cholesterol, or both.  Not getting enough exercise or physical activity.  Being overweight.  Having too much fat, sugar, calories, or salt (sodium) in your diet.  Drinking too much alcohol.  Some risk factors for high blood pressure may be difficult or impossible to change. Some of these factors include:  Having chronic kidney disease.  Having a family history of high blood pressure.  Age. Risk increases with age.  Race. You may be at higher risk if you are .  Gender. Men are at higher risk than women before age 45. After age 65, women are at higher risk than men.  Having obstructive sleep apnea.  Stress.  What are the signs or symptoms?  High blood pressure may not cause symptoms. Very high blood pressure " (hypertensive crisis) may cause:  Headache.  Anxiety.  Shortness of breath.  Nosebleed.  Nausea and vomiting.  Vision changes.  Severe chest pain.  Seizures.  How is this diagnosed?  This condition is diagnosed by measuring your blood pressure while you are seated, with your arm resting on a flat surface, your legs uncrossed, and your feet flat on the floor. The cuff of the blood pressure monitor will be placed directly against the skin of your upper arm at the level of your heart. It should be measured at least twice using the same arm. Certain conditions can cause a difference in blood pressure between your right and left arms.  Certain factors can cause blood pressure readings to be lower or higher than normal for a short period of time:  When your blood pressure is higher when you are in a health care provider's office than when you are at home, this is called white coat hypertension. Most people with this condition do not need medicines.  When your blood pressure is higher at home than when you are in a health care provider's office, this is called masked hypertension. Most people with this condition may need medicines to control blood pressure.  If you have a high blood pressure reading during one visit or you have normal blood pressure with other risk factors, you may be asked to:  Return on a different day to have your blood pressure checked again.  Monitor your blood pressure at home for 1 week or longer.  If you are diagnosed with hypertension, you may have other blood or imaging tests to help your health care provider understand your overall risk for other conditions.  How is this treated?  This condition is treated by making healthy lifestyle changes, such as eating healthy foods, exercising more, and reducing your alcohol intake. Your health care provider may prescribe medicine if lifestyle changes are not enough to get your blood pressure under control, and if:  Your systolic blood pressure is above  130.  Your diastolic blood pressure is above 80.  Your personal target blood pressure may vary depending on your medical conditions, your age, and other factors.  Follow these instructions at home:  Eating and drinking    Eat a diet that is high in fiber and potassium, and low in sodium, added sugar, and fat. An example eating plan is called the DASH (Dietary Approaches to Stop Hypertension) diet. To eat this way:  Eat plenty of fresh fruits and vegetables. Try to fill one half of your plate at each meal with fruits and vegetables.  Eat whole grains, such as whole-wheat pasta, brown rice, or whole-grain bread. Fill about one fourth of your plate with whole grains.  Eat or drink low-fat dairy products, such as skim milk or low-fat yogurt.  Avoid fatty cuts of meat, processed or cured meats, and poultry with skin. Fill about one fourth of your plate with lean proteins, such as fish, chicken without skin, beans, eggs, or tofu.  Avoid pre-made and processed foods. These tend to be higher in sodium, added sugar, and fat.  Reduce your daily sodium intake. Most people with hypertension should eat less than 1,500 mg of sodium a day.  Do not drink alcohol if:  Your health care provider tells you not to drink.  You are pregnant, may be pregnant, or are planning to become pregnant.  If you drink alcohol:  Limit how much you use to:  0-1 drink a day for women.  0-2 drinks a day for men.  Be aware of how much alcohol is in your drink. In the [place] one drink equals one 12 oz bottle of beer (355 mL), one 5 oz glass of wine (148 mL), or one 1½ oz glass of hard liquor (44 mL).    Lifestyle    Work with your health care provider to maintain a healthy body weight or to lose weight. Ask what an ideal weight is for you.  Get at least 30 minutes of exercise most days of the week. Activities may include walking, swimming, or biking.  Include exercise to strengthen your muscles (resistance exercise), such as Pilates or lifting weights, as  part of your weekly exercise routine. Try to do these types of exercises for 30 minutes at least 3 days a week.  Do not use any products that contain nicotine or tobacco, such as cigarettes, e-cigarettes, and chewing tobacco. If you need help quitting, ask your health care provider.  Monitor your blood pressure at home as told by your health care provider.  Keep all follow-up visits as told by your health care provider. This is important.    Medicines  Take over-the-counter and prescription medicines only as told by your health care provider. Follow directions carefully. Blood pressure medicines must be taken as prescribed.  Do not skip doses of blood pressure medicine. Doing this puts you at risk for problems and can make the medicine less effective.  Ask your health care provider about side effects or reactions to medicines that you should watch for.  Contact a health care provider if you:  Think you are having a reaction to a medicine you are taking.  Have headaches that keep coming back (recurring).  Feel dizzy.  Have swelling in your ankles.  Have trouble with your vision.  Get help right away if you:  Develop a severe headache or confusion.  Have unusual weakness or numbness.  Feel faint.  Have severe pain in your chest or abdomen.  Vomit repeatedly.  Have trouble breathing.  Summary  Hypertension is when the force of blood pumping through your arteries is too strong. If this condition is not controlled, it may put you at risk for serious complications.  Your personal target blood pressure may vary depending on your medical conditions, your age, and other factors. For most people, a normal blood pressure is less than 120/80.  Hypertension is treated with lifestyle changes, medicines, or a combination of both. Lifestyle changes include losing weight, eating a healthy, low-sodium diet, exercising more, and limiting alcohol.  This information is not intended to replace advice given to you by your health care  "provider. Make sure you discuss any questions you have with your health care provider.  Document Revised: 08/28/2019 Document Reviewed: 08/28/2019  [employer] Patient Education © 2021 [employer]  BMI for Adults  What is BMI?  Body mass index (BMI) is a number that is calculated from a person's weight and height. BMI can help estimate how much of a person's weight is composed of fat. BMI does not measure body fat directly. Rather, it is an alternative to procedures that directly measure body fat, which can be difficult and expensive.  BMI can help identify people who may be at higher risk for certain medical problems.  What are BMI measurements used for?  BMI is used as a screening tool to identify possible weight problems. It helps determine whether a person is obese, overweight, a healthy weight, or underweight.  BMI is useful for:  Identifying a weight problem that may be related to a medical condition or may increase the risk for medical problems.  Promoting changes, such as changes in diet and exercise, to help reach a healthy weight. BMI screening can be repeated to see if these changes are working.  How is BMI calculated?  BMI involves measuring your weight in relation to your height. Both height and weight are measured, and the BMI is calculated from those numbers. This can be done either in English (U[place] or metric measurements. Note that charts and online BMI calculators are available to help you find your BMI quickly and easily without having to do these calculations yourself.  To calculate your BMI in English ([place] measurements:    Measure your weight in pounds (lb).  Multiply the number of pounds by 703.  For example, for a person who weighs 180 lb, multiply that number by 703, which equals 126,540.  Measure your height in inches. Then multiply that number by itself to get a measurement called \"inches squared.\"  For example, for a person who is 70 inches tall, the \"inches squared\" measurement is 70 " "inches x 70 inches, which equals 4,900 inches squared.  Divide the total from step 2 (number of lb x 703) by the total from step 3 (inches squared): 126,540 ÷ 4,900 = 25.8. This is your BMI.    To calculate your BMI in metric measurements:  Measure your weight in kilograms (kg).  Measure your height in meters (m). Then multiply that number by itself to get a measurement called \"meters squared.\"  For example, for a person who is 1.75 m tall, the \"meters squared\" measurement is 1.75 m x 1.75 m, which is equal to 3.1 meters squared.  Divide the number of kilograms (your weight) by the meters squared number. In this example: 70 ÷ 3.1 = 22.6. This is your BMI.  What do the results mean?  BMI charts are used to identify whether you are underweight, normal weight, overweight, or obese. The following guidelines will be used:  Underweight: BMI less than 18.5.  Normal weight: BMI between 18.5 and 24.9.  Overweight: BMI between 25 and 29.9.  Obese: BMI of 30 or above.  Keep these notes in mind:  Weight includes both fat and muscle, so someone with a muscular build, such as an athlete, may have a BMI that is higher than 24.9. In cases like these, BMI is not an accurate measure of body fat.  To determine if excess body fat is the cause of a BMI of 25 or higher, further assessments may need to be done by a health care provider.  BMI is usually interpreted in the same way for men and women.  Where to find more information  For more information about BMI, including tools to quickly calculate your BMI, go to these websites:  Centers for Disease Control and Prevention: www.cdc.gov  American Heart Association: www.heart.org  National Heart, Lung, and Blood [place]: www.nhlbi.nih.gov  Summary  Body mass index (BMI) is a number that is calculated from a person's weight and height.  BMI may help estimate how much of a person's weight is composed of fat. BMI can help identify those who may be at higher risk for certain medical " problems.  BMI can be measured using English measurements or metric measurements.  BMI charts are used to identify whether you are underweight, normal weight, overweight, or obese.  This information is not intended to replace advice given to you by your health care provider. Make sure you discuss any questions you have with your health care provider.  Document Revised: 09/09/2020 Document Reviewed: 07/17/2020  [employer] Patient Education © 2021 [employer]

## 2022-09-09 NOTE — ASSESSMENT & PLAN NOTE
Patient's depression is single episode and is moderate without psychosis. Their depression is currently active and the condition is newly identified. This will be reassessed 8 weeks. F/U as described:patient was prescribed an antidepressant medicine.  RTC/ED precautions given.

## 2022-09-09 NOTE — ASSESSMENT & PLAN NOTE
Hypertension is Worsening off of medications.  Dietary sodium restriction.  Weight loss.  Regular aerobic exercise.  Medication changes per orders.  Patient was restarted on Exforge as well as metoprolol.  Ambulatory blood pressure monitoring.  Blood pressure will be reassessed 8 weeks.

## 2022-09-09 NOTE — ASSESSMENT & PLAN NOTE
Chronic and worsening.  Patient is not currently on any medications.  Patient was started on Effexor to take daily and hydroxyzine to use as needed.

## 2022-09-14 DIAGNOSIS — Z12.11 ENCOUNTER FOR SCREENING COLONOSCOPY: Primary | ICD-10-CM

## 2022-09-22 ENCOUNTER — OUTSIDE FACILITY SERVICE (OUTPATIENT)
Dept: GASTROENTEROLOGY | Facility: CLINIC | Age: 47
End: 2022-09-22

## 2022-09-22 PROCEDURE — 45385 COLONOSCOPY W/LESION REMOVAL: CPT | Performed by: INTERNAL MEDICINE

## 2022-09-22 PROCEDURE — 88305 TISSUE EXAM BY PATHOLOGIST: CPT | Performed by: INTERNAL MEDICINE

## 2022-09-23 ENCOUNTER — LAB REQUISITION (OUTPATIENT)
Dept: LAB | Facility: HOSPITAL | Age: 47
End: 2022-09-23

## 2022-09-23 DIAGNOSIS — D12.3 BENIGN NEOPLASM OF TRANSVERSE COLON: ICD-10-CM

## 2022-09-23 DIAGNOSIS — Z12.11 ENCOUNTER FOR SCREENING FOR MALIGNANT NEOPLASM OF COLON: ICD-10-CM

## 2022-09-23 DIAGNOSIS — K64.8 OTHER HEMORRHOIDS: ICD-10-CM

## 2022-09-26 LAB
CYTO UR: NORMAL
LAB AP CASE REPORT: NORMAL
LAB AP CLINICAL INFORMATION: NORMAL
PATH REPORT.FINAL DX SPEC: NORMAL
PATH REPORT.GROSS SPEC: NORMAL

## 2022-11-07 ENCOUNTER — TELEPHONE (OUTPATIENT)
Dept: FAMILY MEDICINE CLINIC | Facility: CLINIC | Age: 47
End: 2022-11-07

## 2023-03-14 DIAGNOSIS — I10 PRIMARY HYPERTENSION: ICD-10-CM

## 2023-03-15 NOTE — TELEPHONE ENCOUNTER
Rx Refill Note  Requested Prescriptions     Pending Prescriptions Disp Refills   • metoprolol tartrate (LOPRESSOR) 25 MG tablet [Pharmacy Med Name: METOPROLOL TARTRATE 25MG TABLETS] 60 tablet 5     Sig: TAKE 1 TABLET BY MOUTH EVERY 12 HOURS      Last office visit with prescribing clinician: 9/9/2022   Last telemedicine visit with prescribing clinician: Visit date not found   Next office visit with prescribing clinician: Visit date not found                         Would you like a call back once the refill request has been completed: [] Yes [] No    If the office needs to give you a call back, can they leave a voicemail: [] Yes [] No    Darling Ambrocio MA  03/15/23, 09:50 EDT

## 2023-03-28 ENCOUNTER — APPOINTMENT (OUTPATIENT)
Dept: CT IMAGING | Facility: HOSPITAL | Age: 48
End: 2023-03-28
Payer: COMMERCIAL

## 2023-03-28 ENCOUNTER — HOSPITAL ENCOUNTER (EMERGENCY)
Facility: HOSPITAL | Age: 48
Discharge: HOME | End: 2023-03-28
Attending: EMERGENCY MEDICINE | Admitting: EMERGENCY MEDICINE
Payer: COMMERCIAL

## 2023-03-28 VITALS
BODY MASS INDEX: 32.2 KG/M2 | HEIGHT: 71 IN | RESPIRATION RATE: 20 BRPM | SYSTOLIC BLOOD PRESSURE: 148 MMHG | OXYGEN SATURATION: 97 % | DIASTOLIC BLOOD PRESSURE: 108 MMHG | HEART RATE: 65 BPM | TEMPERATURE: 97.7 F | WEIGHT: 230 LBS

## 2023-03-28 DIAGNOSIS — K29.80 DUODENITIS: Primary | ICD-10-CM

## 2023-03-28 DIAGNOSIS — R10.9 ACUTE ABDOMINAL PAIN: ICD-10-CM

## 2023-03-28 LAB
ALBUMIN SERPL-MCNC: 4 G/DL (ref 3.5–5.2)
ALBUMIN/GLOB SERPL: 1 G/DL
ALP SERPL-CCNC: 86 U/L (ref 39–117)
ALT SERPL W P-5'-P-CCNC: 32 U/L (ref 1–41)
ANION GAP SERPL CALCULATED.3IONS-SCNC: 15 MMOL/L (ref 5–15)
AST SERPL-CCNC: 80 U/L (ref 1–40)
BACTERIA UR QL AUTO: ABNORMAL /HPF
BASOPHILS # BLD AUTO: 0.12 10*3/MM3 (ref 0–0.2)
BASOPHILS NFR BLD AUTO: 1.7 % (ref 0–1.5)
BILIRUB SERPL-MCNC: 1.4 MG/DL (ref 0–1.2)
BILIRUB UR QL STRIP: NEGATIVE
BUN SERPL-MCNC: 12 MG/DL (ref 6–20)
BUN/CREAT SERPL: 8.8 (ref 7–25)
CALCIUM SPEC-SCNC: 8.8 MG/DL (ref 8.6–10.5)
CHLORIDE SERPL-SCNC: 96 MMOL/L (ref 98–107)
CLARITY UR: CLEAR
CO2 SERPL-SCNC: 24 MMOL/L (ref 22–29)
COLOR UR: YELLOW
CREAT SERPL-MCNC: 1.37 MG/DL (ref 0.76–1.27)
DEPRECATED RDW RBC AUTO: 48 FL (ref 37–54)
EGFRCR SERPLBLD CKD-EPI 2021: 63.6 ML/MIN/1.73
EOSINOPHIL # BLD AUTO: 0.07 10*3/MM3 (ref 0–0.4)
EOSINOPHIL NFR BLD AUTO: 1 % (ref 0.3–6.2)
ERYTHROCYTE [DISTWIDTH] IN BLOOD BY AUTOMATED COUNT: 17.2 % (ref 12.3–15.4)
GLOBULIN UR ELPH-MCNC: 3.9 GM/DL
GLUCOSE SERPL-MCNC: 167 MG/DL (ref 65–99)
GLUCOSE UR STRIP-MCNC: NEGATIVE MG/DL
HCT VFR BLD AUTO: 42.5 % (ref 37.5–51)
HGB BLD-MCNC: 13.7 G/DL (ref 13–17.7)
HGB UR QL STRIP.AUTO: NEGATIVE
HOLD SPECIMEN: NORMAL
HYALINE CASTS UR QL AUTO: ABNORMAL /LPF
IMM GRANULOCYTES # BLD AUTO: 0.01 10*3/MM3 (ref 0–0.05)
IMM GRANULOCYTES NFR BLD AUTO: 0.1 % (ref 0–0.5)
KETONES UR QL STRIP: ABNORMAL
LEUKOCYTE ESTERASE UR QL STRIP.AUTO: ABNORMAL
LIPASE SERPL-CCNC: 33 U/L (ref 13–60)
LYMPHOCYTES # BLD AUTO: 1.59 10*3/MM3 (ref 0.7–3.1)
LYMPHOCYTES NFR BLD AUTO: 22.8 % (ref 19.6–45.3)
MCH RBC QN AUTO: 25.7 PG (ref 26.6–33)
MCHC RBC AUTO-ENTMCNC: 32.2 G/DL (ref 31.5–35.7)
MCV RBC AUTO: 79.6 FL (ref 79–97)
MONOCYTES # BLD AUTO: 0.68 10*3/MM3 (ref 0.1–0.9)
MONOCYTES NFR BLD AUTO: 9.8 % (ref 5–12)
NEUTROPHILS NFR BLD AUTO: 4.5 10*3/MM3 (ref 1.7–7)
NEUTROPHILS NFR BLD AUTO: 64.6 % (ref 42.7–76)
NITRITE UR QL STRIP: NEGATIVE
NRBC BLD AUTO-RTO: 0 /100 WBC (ref 0–0.2)
PH UR STRIP.AUTO: 5.5 [PH] (ref 5–8)
PLATELET # BLD AUTO: 266 10*3/MM3 (ref 140–450)
PMV BLD AUTO: 9.7 FL (ref 6–12)
POTASSIUM SERPL-SCNC: 3.4 MMOL/L (ref 3.5–5.2)
PROT SERPL-MCNC: 7.9 G/DL (ref 6–8.5)
PROT UR QL STRIP: NEGATIVE
RBC # BLD AUTO: 5.34 10*6/MM3 (ref 4.14–5.8)
RBC # UR STRIP: ABNORMAL /HPF
REF LAB TEST METHOD: ABNORMAL
SODIUM SERPL-SCNC: 135 MMOL/L (ref 136–145)
SP GR UR STRIP: 1.02 (ref 1–1.03)
SQUAMOUS #/AREA URNS HPF: ABNORMAL /HPF
UROBILINOGEN UR QL STRIP: ABNORMAL
WBC # UR STRIP: ABNORMAL /HPF
WBC NRBC COR # BLD: 6.97 10*3/MM3 (ref 3.4–10.8)
WHOLE BLOOD HOLD COAG: NORMAL
WHOLE BLOOD HOLD SPECIMEN: NORMAL

## 2023-03-28 PROCEDURE — 36415 COLL VENOUS BLD VENIPUNCTURE: CPT

## 2023-03-28 PROCEDURE — 74177 CT ABD & PELVIS W/CONTRAST: CPT

## 2023-03-28 PROCEDURE — 99284 EMERGENCY DEPT VISIT MOD MDM: CPT

## 2023-03-28 PROCEDURE — 85025 COMPLETE CBC W/AUTO DIFF WBC: CPT | Performed by: EMERGENCY MEDICINE

## 2023-03-28 PROCEDURE — 25010000002 ONDANSETRON PER 1 MG: Performed by: EMERGENCY MEDICINE

## 2023-03-28 PROCEDURE — 25510000001 IOPAMIDOL 61 % SOLUTION: Performed by: EMERGENCY MEDICINE

## 2023-03-28 PROCEDURE — 80053 COMPREHEN METABOLIC PANEL: CPT | Performed by: EMERGENCY MEDICINE

## 2023-03-28 PROCEDURE — 83690 ASSAY OF LIPASE: CPT | Performed by: EMERGENCY MEDICINE

## 2023-03-28 PROCEDURE — 96375 TX/PRO/DX INJ NEW DRUG ADDON: CPT

## 2023-03-28 PROCEDURE — 96374 THER/PROPH/DIAG INJ IV PUSH: CPT

## 2023-03-28 PROCEDURE — 81001 URINALYSIS AUTO W/SCOPE: CPT | Performed by: EMERGENCY MEDICINE

## 2023-03-28 PROCEDURE — 25010000002 MORPHINE PER 10 MG: Performed by: EMERGENCY MEDICINE

## 2023-03-28 RX ORDER — OMEPRAZOLE 40 MG/1
40 CAPSULE, DELAYED RELEASE ORAL DAILY
Qty: 30 CAPSULE | Refills: 0 | Status: SHIPPED | OUTPATIENT
Start: 2023-03-28 | End: 2023-04-27

## 2023-03-28 RX ORDER — TRAMADOL HYDROCHLORIDE 50 MG/1
50 TABLET ORAL EVERY 6 HOURS PRN
Qty: 12 TABLET | Refills: 0 | Status: SHIPPED | OUTPATIENT
Start: 2023-03-28

## 2023-03-28 RX ORDER — OMEPRAZOLE 40 MG/1
40 CAPSULE, DELAYED RELEASE ORAL DAILY
Qty: 30 CAPSULE | Refills: 0 | Status: SHIPPED | OUTPATIENT
Start: 2023-03-28 | End: 2023-03-28 | Stop reason: SDUPTHER

## 2023-03-28 RX ORDER — TRAMADOL HYDROCHLORIDE 50 MG/1
50 TABLET ORAL EVERY 6 HOURS PRN
Qty: 12 TABLET | Refills: 0 | Status: SHIPPED | OUTPATIENT
Start: 2023-03-28 | End: 2023-03-28 | Stop reason: SDUPTHER

## 2023-03-28 RX ORDER — SODIUM CHLORIDE 9 MG/ML
10 INJECTION INTRAVENOUS AS NEEDED
Status: DISCONTINUED | OUTPATIENT
Start: 2023-03-28 | End: 2023-03-28 | Stop reason: HOSPADM

## 2023-03-28 RX ORDER — KETOROLAC TROMETHAMINE 10 MG/1
10 TABLET, FILM COATED ORAL ONCE
Status: COMPLETED | OUTPATIENT
Start: 2023-03-28 | End: 2023-03-28

## 2023-03-28 RX ORDER — MORPHINE SULFATE 4 MG/ML
4 INJECTION, SOLUTION INTRAMUSCULAR; INTRAVENOUS ONCE
Status: COMPLETED | OUTPATIENT
Start: 2023-03-28 | End: 2023-03-28

## 2023-03-28 RX ORDER — ONDANSETRON 2 MG/ML
4 INJECTION INTRAMUSCULAR; INTRAVENOUS ONCE
Status: COMPLETED | OUTPATIENT
Start: 2023-03-28 | End: 2023-03-28

## 2023-03-28 RX ADMIN — MORPHINE SULFATE 4 MG: 4 INJECTION, SOLUTION INTRAMUSCULAR; INTRAVENOUS at 21:05

## 2023-03-28 RX ADMIN — IOPAMIDOL 85 ML: 612 INJECTION, SOLUTION INTRAVENOUS at 20:10

## 2023-03-28 RX ADMIN — SODIUM CHLORIDE 1000 ML: 9 INJECTION, SOLUTION INTRAVENOUS at 21:05

## 2023-03-28 RX ADMIN — ONDANSETRON 4 MG: 2 INJECTION INTRAMUSCULAR; INTRAVENOUS at 21:05

## 2023-03-28 RX ADMIN — KETOROLAC TROMETHAMINE 10 MG: 10 TABLET, FILM COATED ORAL at 21:40

## 2023-03-28 NOTE — Clinical Note
[facility] EMERGENCY DEPARTMENT  [address] [place] [address]  Phone: [phone]    Terrence James was seen and treated in our emergency department on 3/28/2023.  He may return to work on 03/30/2023.         Thank you for choosing [facility].    Ari Bryan MD

## 2023-03-28 NOTE — ED PROVIDER NOTES
Subjective   History of Present Illness  47-year-old male who presents with complaint of abdominal pain.  The patient reports that starting 2 days ago he began to have pain in the right abdomen.  He reports that his appetite has been decreased but he has tolerated some broth and some Jell-O during this time.  He denies significant increased pain in association with that.  He does report some baseline nausea.  He denies any previous surgeries to this abdomen.  Is present more prominently in the right lower quadrant but also is present in the right upper quadrant.  He does report a previous history of pancreatitis in the past.  He denies pain in his epigastric or left upper quadrant region where he had pancreatitis pain in the past.  He denies any alcohol use.  He denies any other illicit drug use.  No new medications.  No urinary symptoms include no dysuria, frequency, or urgency.  No reported change in bowel function clued no diarrhea or blood in the stool.  He denies chest pain, cough, shortness of breath.  No fever, body aches, chills, or other infectious symptoms.  No other acute complaints.        Review of Systems   Constitutional: Positive for activity change and appetite change. Negative for chills, fatigue and fever.   HENT: Negative for congestion, ear pain, postnasal drip, sinus pressure and sore throat.    Eyes: Negative for pain, redness and visual disturbance.   Respiratory: Negative for cough, chest tightness and shortness of breath.    Cardiovascular: Negative for chest pain, palpitations and leg swelling.   Gastrointestinal: Positive for abdominal pain and nausea. Negative for anal bleeding, blood in stool, diarrhea and vomiting.   Endocrine: Negative for polydipsia and polyuria.   Genitourinary: Negative for difficulty urinating, dysuria, frequency and urgency.   Musculoskeletal: Negative for arthralgias, back pain and neck pain.   Skin: Negative for pallor and rash.   Allergic/Immunologic: Negative  for environmental allergies and immunocompromised state.   Neurological: Negative for dizziness, weakness and headaches.   Hematological: Negative for adenopathy.   Psychiatric/Behavioral: Negative for confusion, self-injury and suicidal ideas. The patient is not nervous/anxious.    All other systems reviewed and are negative.      Past Medical History:   Diagnosis Date   • Anxiety    • COVID-19 08/2021   • DVT (deep venous thrombosis) (HCC)     LEFT LEG    • Hypertension    • Wears reading eyeglasses        No Known Allergies    Past Surgical History:   Procedure Laterality Date   • CARDIAC CATHETERIZATION  08/2019   • CORONARY STENT PLACEMENT  08/2019   • EAR TUBES Bilateral 2012   • FEMORAL THROMBECTOMY/EMBOLECTOMY Left 10/20/2021    Procedure: ULTRASOUND GUIDED ACCESS; LEFT LEG VENOGRAM; INTRAVENOUS ULTRASOUND OF FEMORAL VEIN, POPLITEAL VEIN, EXTERNAL AND COMMON ILIAC VEINS; LEFT POPLITEAL VEIN ANGIOPLASTY; LEFT COMMON ILIAC VEIN ANGIOPLASTY; POPLITEAL AND FEMORAL VEIN THROMBECTOMY - LEFT;  Surgeon: Arnel Gordon MD;  Location: [facility];  Service: Vascular;  Laterality: Left;  FLUORO:3 MIN 48 SEC  DOSE: 45 MGY  CONTRAS   • GASTRIC SLEEVE LAPAROSCOPIC  03/2014   • LUMBAR DISCECTOMY  2006    L3-4   • VASECTOMY         Family History   Problem Relation Age of Onset   • Vision loss Mother    • Diabetes Mother    • Kidney disease Mother    • Skin cancer Mother         squamous cell   • Heart attack Father    • No Known Problems Sister    • No Known Problems Sister    • No Known Problems Brother    • Other Maternal Grandmother         unknown   • Other Maternal Grandfather         unknown   • Other Paternal Grandmother         unknown   • Other Paternal Grandfather         unknown   • No Known Problems Daughter    • No Known Problems Son        Social History     Socioeconomic History   • Marital status: Single   Tobacco Use   • Smoking status: Former     Packs/day: 0.25     Years: 10.00     Pack years: 2.50      Types: Cigarettes     Start date:      Quit date:      Years since quittin.2   • Smokeless tobacco: Never   Vaping Use   • Vaping Use: Never used   Substance and Sexual Activity   • Alcohol use: Yes     Comment: ~2-3 DRINK WEEKLY    • Drug use: Never   • Sexual activity: Yes     Partners: Female     Comment: 5-6 female partners in the last year.           Objective   Physical Exam  Vitals and nursing note reviewed.   Constitutional:       General: He is not in acute distress.     Appearance: Normal appearance. He is well-developed. He is not toxic-appearing or diaphoretic.   HENT:      Head: Normocephalic and atraumatic.      Right Ear: External ear normal.      Left Ear: External ear normal.      Nose: Nose normal.   Eyes:      General: Lids are normal.      Pupils: Pupils are equal, round, and reactive to light.   Neck:      Trachea: No tracheal deviation.   Cardiovascular:      Rate and Rhythm: Normal rate and regular rhythm.      Pulses: No decreased pulses.      Heart sounds: Normal heart sounds. No murmur heard.    No friction rub. No gallop.   Pulmonary:      Effort: Pulmonary effort is normal. No respiratory distress.      Breath sounds: Normal breath sounds. No decreased breath sounds, wheezing, rhonchi or rales.   Abdominal:      General: Bowel sounds are normal.      Palpations: Abdomen is soft.      Tenderness: There is abdominal tenderness in the right upper quadrant and right lower quadrant. There is no guarding or rebound.   Musculoskeletal:         General: No deformity. Normal range of motion.      Cervical back: Normal range of motion and neck supple.   Lymphadenopathy:      Cervical: No cervical adenopathy.   Skin:     General: Skin is warm and dry.      Findings: No rash.   Neurological:      Mental Status: He is alert and oriented to person, place, and time.      Cranial Nerves: No cranial nerve deficit.      Sensory: No sensory deficit.   Psychiatric:         Speech: Speech  normal.         Behavior: Behavior normal.         Thought Content: Thought content normal.         Judgment: Judgment normal.         Procedures           ED Course                                           Medical Decision Making  Differential diagnosis includes gastroenteritis, GERD, duodenitis, bowel obstruction, pancreatitis, urine tract infection, constipation, other unspecified etiology.    Lab valuation shows normal white blood cell count.  Lipase is normal.    Mild renal insufficiency potentially representing mild dehydration.    Urine does not show infection.    Electrolytes are nonconcerning.    CT scan of the abdomen pelvis reports inflammation of the first through third portions of duodenum consistent with duodenitis.  There is an area of irregularity in the second portion of the duodenum that may represent an ulcer.  No signs of secondary perforation or abscess.    The patient reportedly better after receiving pain medication.  He has been able to tolerate oral intake.    He will be discharged with medication for pain, advised to drink plenty fluids, observe a bland diet, and will be referred to GI for outpatient follow-up.    Duodenitis: acute illness or injury  Amount and/or Complexity of Data Reviewed  Independent Historian: spouse  External Data Reviewed: labs, radiology and notes.  Labs: ordered.  Radiology: ordered.      Risk  Prescription drug management.          Final diagnoses:   Duodenitis   Acute abdominal pain       ED Disposition  ED Disposition     ED Disposition   Discharge    Condition   Stable    Comment   --             Silvano Shields MD  [address] [place] [address]  [phone]    Schedule an appointment as soon as possible for a visit       Pino Borrero MD  [facility]  [place] [id number] [phone]    In 1 week           Medication List      New Prescriptions    omeprazole 40 MG capsule  Commonly known as: priLOSEC  Take 1  capsule by mouth Daily for 30 days.     traMADol 50 MG tablet  Commonly known as: ULTRAM  Take 1 tablet by mouth Every 6 (Six) Hours As Needed for Moderate Pain or Severe Pain for up to 12 doses.           Where to Get Your Medications      These medications were sent to [facility]/pharmacy #[id number] - [place], [place] - [address] - [phone]  - [phone] FX  [address], [facility] [address]    Hours: 24-hours Phone: [phone]   · omeprazole 40 MG capsule  · traMADol 50 MG tablet          Ari Bryan MD  03/30/23 [id number]

## 2023-03-29 NOTE — DISCHARGE INSTRUCTIONS
Make sure to drink plenty of fluids.    Observe a bland diet.    Follow-up with GI for outpatient evaluation.

## 2023-05-04 DIAGNOSIS — F41.9 ANXIETY: ICD-10-CM

## 2023-05-04 NOTE — TELEPHONE ENCOUNTER
Rx Refill Note  Requested Prescriptions     Pending Prescriptions Disp Refills   • hydrOXYzine (ATARAX) 25 MG tablet [Pharmacy Med Name: HYDROXYZINE HCL 25MG TABS (WHITE)] 100 tablet 5     Sig: TAKE 1 TABLET BY MOUTH EVERY 6 HOURS AS NEEDED FOR ANXIETY      Last office visit with prescribing clinician: 9/9/2022   Last telemedicine visit with prescribing clinician: Visit date not found   Next office visit with prescribing clinician: Visit date not found                         Would you like a call back once the refill request has been completed: [] Yes [] No    If the office needs to give you a call back, can they leave a voicemail: [] Yes [] No    Irina Toney MA  05/04/23, 10:53 EDT

## 2023-05-05 RX ORDER — HYDROXYZINE HYDROCHLORIDE 25 MG/1
TABLET, FILM COATED ORAL
Qty: 100 TABLET | Refills: 5 | Status: SHIPPED | OUTPATIENT
Start: 2023-05-05

## 2023-08-03 ENCOUNTER — LAB (OUTPATIENT)
Dept: LAB | Facility: HOSPITAL | Age: 48
End: 2023-08-03
Payer: COMMERCIAL

## 2023-08-03 ENCOUNTER — OFFICE VISIT (OUTPATIENT)
Dept: FAMILY MEDICINE CLINIC | Facility: CLINIC | Age: 48
End: 2023-08-03
Payer: COMMERCIAL

## 2023-08-03 VITALS
OXYGEN SATURATION: 100 % | DIASTOLIC BLOOD PRESSURE: 82 MMHG | BODY MASS INDEX: 33.65 KG/M2 | HEIGHT: 71 IN | TEMPERATURE: 98.9 F | HEART RATE: 72 BPM | SYSTOLIC BLOOD PRESSURE: 140 MMHG | WEIGHT: 240.4 LBS

## 2023-08-03 DIAGNOSIS — I10 HYPERTENSION, UNSPECIFIED TYPE: ICD-10-CM

## 2023-08-03 DIAGNOSIS — F32.A ANXIETY AND DEPRESSION: ICD-10-CM

## 2023-08-03 DIAGNOSIS — K21.9 GASTROESOPHAGEAL REFLUX DISEASE, UNSPECIFIED WHETHER ESOPHAGITIS PRESENT: ICD-10-CM

## 2023-08-03 DIAGNOSIS — F41.9 ANXIETY AND DEPRESSION: ICD-10-CM

## 2023-08-03 DIAGNOSIS — R79.89 ELEVATED SERUM CREATININE: ICD-10-CM

## 2023-08-03 DIAGNOSIS — I10 HYPERTENSION, UNSPECIFIED TYPE: Primary | ICD-10-CM

## 2023-08-03 PROBLEM — F10.10 ALCOHOL ABUSE: Status: RESOLVED | Noted: 2023-08-03 | Resolved: 2023-08-03

## 2023-08-03 PROBLEM — F10.10 ALCOHOL ABUSE: Status: ACTIVE | Noted: 2023-08-03

## 2023-08-03 LAB
ALBUMIN SERPL-MCNC: 4 G/DL (ref 3.5–5.2)
ALBUMIN/GLOB SERPL: 1.2 G/DL
ALP SERPL-CCNC: 106 U/L (ref 39–117)
ALT SERPL W P-5'-P-CCNC: 32 U/L (ref 1–41)
ANION GAP SERPL CALCULATED.3IONS-SCNC: 11 MMOL/L (ref 5–15)
AST SERPL-CCNC: 41 U/L (ref 1–40)
BILIRUB SERPL-MCNC: 0.3 MG/DL (ref 0–1.2)
BUN SERPL-MCNC: 10 MG/DL (ref 6–20)
BUN/CREAT SERPL: 8.1 (ref 7–25)
CALCIUM SPEC-SCNC: 9.1 MG/DL (ref 8.6–10.5)
CHLORIDE SERPL-SCNC: 102 MMOL/L (ref 98–107)
CHOLEST SERPL-MCNC: 203 MG/DL (ref 0–200)
CO2 SERPL-SCNC: 26 MMOL/L (ref 22–29)
CREAT SERPL-MCNC: 1.23 MG/DL (ref 0.76–1.27)
EGFRCR SERPLBLD CKD-EPI 2021: 72 ML/MIN/1.73
GLOBULIN UR ELPH-MCNC: 3.4 GM/DL
GLUCOSE SERPL-MCNC: 121 MG/DL (ref 65–99)
HBA1C MFR BLD: 7.7 % (ref 4.8–5.6)
HDLC SERPL-MCNC: 39 MG/DL (ref 40–60)
LDLC SERPL CALC-MCNC: 101 MG/DL (ref 0–100)
LDLC/HDLC SERPL: 2.31 {RATIO}
POTASSIUM SERPL-SCNC: 4 MMOL/L (ref 3.5–5.2)
PROT SERPL-MCNC: 7.4 G/DL (ref 6–8.5)
SODIUM SERPL-SCNC: 139 MMOL/L (ref 136–145)
TRIGL SERPL-MCNC: 370 MG/DL (ref 0–150)
TSH SERPL DL<=0.05 MIU/L-ACNC: 1.59 UIU/ML (ref 0.27–4.2)
VLDLC SERPL-MCNC: 63 MG/DL (ref 5–40)

## 2023-08-03 PROCEDURE — 84443 ASSAY THYROID STIM HORMONE: CPT

## 2023-08-03 PROCEDURE — 80061 LIPID PANEL: CPT

## 2023-08-03 PROCEDURE — 99214 OFFICE O/P EST MOD 30 MIN: CPT | Performed by: STUDENT IN AN ORGANIZED HEALTH CARE EDUCATION/TRAINING PROGRAM

## 2023-08-03 PROCEDURE — 83036 HEMOGLOBIN GLYCOSYLATED A1C: CPT

## 2023-08-03 PROCEDURE — 80053 COMPREHEN METABOLIC PANEL: CPT

## 2023-08-03 RX ORDER — HYDROXYZINE 50 MG/1
50 TABLET, FILM COATED ORAL 3 TIMES DAILY PRN
Qty: 60 TABLET | Refills: 0 | Status: SHIPPED | OUTPATIENT
Start: 2023-08-03

## 2023-08-03 RX ORDER — METOPROLOL TARTRATE 50 MG/1
50 TABLET, FILM COATED ORAL 2 TIMES DAILY
Qty: 60 TABLET | Refills: 0 | Status: SHIPPED | OUTPATIENT
Start: 2023-08-03

## 2023-08-03 RX ORDER — BUPROPION HYDROCHLORIDE 150 MG/1
150 TABLET ORAL DAILY
Qty: 30 TABLET | Refills: 0 | Status: SHIPPED | OUTPATIENT
Start: 2023-08-03

## 2023-08-03 RX ORDER — AMLODIPINE BESYLATE 5 MG/1
5 TABLET ORAL DAILY
Qty: 30 TABLET | Refills: 0 | Status: SHIPPED | OUTPATIENT
Start: 2023-08-03

## 2023-08-03 RX ORDER — HYDROCODONE BITARTRATE AND ACETAMINOPHEN 5; 325 MG/1; MG/1
1 TABLET ORAL
COMMUNITY
Start: 2023-04-14 | End: 2023-08-03

## 2023-08-03 RX ORDER — OMEPRAZOLE 40 MG/1
40 CAPSULE, DELAYED RELEASE ORAL DAILY
Qty: 30 CAPSULE | Refills: 2 | Status: SHIPPED | OUTPATIENT
Start: 2023-08-03

## 2023-08-04 RX ORDER — AMLODIPINE BESYLATE 5 MG/1
5 TABLET ORAL DAILY
Qty: 90 TABLET | OUTPATIENT
Start: 2023-08-04

## 2023-08-04 RX ORDER — METOPROLOL TARTRATE 50 MG/1
TABLET, FILM COATED ORAL
Qty: 180 TABLET | OUTPATIENT
Start: 2023-08-04

## 2023-08-17 ENCOUNTER — OFFICE VISIT (OUTPATIENT)
Dept: FAMILY MEDICINE CLINIC | Facility: CLINIC | Age: 48
End: 2023-08-17
Payer: COMMERCIAL

## 2023-08-17 VITALS
HEIGHT: 71 IN | DIASTOLIC BLOOD PRESSURE: 90 MMHG | SYSTOLIC BLOOD PRESSURE: 122 MMHG | OXYGEN SATURATION: 98 % | HEART RATE: 88 BPM | TEMPERATURE: 98.3 F | BODY MASS INDEX: 33.91 KG/M2 | WEIGHT: 242.2 LBS

## 2023-08-17 DIAGNOSIS — F41.9 ANXIETY AND DEPRESSION: ICD-10-CM

## 2023-08-17 DIAGNOSIS — R79.89 LOW TESTOSTERONE: ICD-10-CM

## 2023-08-17 DIAGNOSIS — Z00.00 ANNUAL PHYSICAL EXAM: Primary | ICD-10-CM

## 2023-08-17 DIAGNOSIS — F32.A ANXIETY AND DEPRESSION: ICD-10-CM

## 2023-08-17 DIAGNOSIS — I25.10 CORONARY ARTERY DISEASE INVOLVING NATIVE CORONARY ARTERY OF NATIVE HEART WITHOUT ANGINA PECTORIS: ICD-10-CM

## 2023-08-17 DIAGNOSIS — I10 PRIMARY HYPERTENSION: ICD-10-CM

## 2023-08-17 DIAGNOSIS — E11.65 TYPE 2 DIABETES MELLITUS WITH HYPERGLYCEMIA, WITHOUT LONG-TERM CURRENT USE OF INSULIN: ICD-10-CM

## 2023-08-17 PROCEDURE — 99396 PREV VISIT EST AGE 40-64: CPT | Performed by: STUDENT IN AN ORGANIZED HEALTH CARE EDUCATION/TRAINING PROGRAM

## 2023-08-17 RX ORDER — METFORMIN HYDROCHLORIDE 500 MG/1
500 TABLET, EXTENDED RELEASE ORAL
Qty: 30 TABLET | Refills: 0 | Status: SHIPPED | OUTPATIENT
Start: 2023-08-17 | End: 2023-08-18

## 2023-08-17 RX ORDER — SEMAGLUTIDE 1.34 MG/ML
0.25 INJECTION, SOLUTION SUBCUTANEOUS WEEKLY
Qty: 1.5 ML | Refills: 0 | Status: SHIPPED | OUTPATIENT
Start: 2023-08-17

## 2023-08-17 RX ORDER — BUSPIRONE HYDROCHLORIDE 5 MG/1
10 TABLET ORAL 2 TIMES DAILY
Qty: 60 TABLET | Refills: 0 | Status: SHIPPED | OUTPATIENT
Start: 2023-08-17 | End: 2023-08-17 | Stop reason: SDUPTHER

## 2023-08-17 RX ORDER — BUSPIRONE HYDROCHLORIDE 10 MG/1
10 TABLET ORAL 2 TIMES DAILY
Qty: 60 TABLET | Refills: 0 | Status: SHIPPED | OUTPATIENT
Start: 2023-08-17

## 2023-08-17 RX ORDER — ROSUVASTATIN CALCIUM 5 MG/1
5 TABLET, COATED ORAL DAILY
Qty: 90 TABLET | Refills: 0 | Status: SHIPPED | OUTPATIENT
Start: 2023-08-17

## 2023-08-17 RX ORDER — AMLODIPINE AND VALSARTAN 10; 160 MG/1; MG/1
1 TABLET ORAL DAILY
Qty: 30 TABLET | Refills: 5 | Status: SHIPPED | OUTPATIENT
Start: 2023-08-17 | End: 2024-08-16

## 2023-08-17 NOTE — ASSESSMENT & PLAN NOTE
- Discussed diet and exercise: At least 30 minutes of exercise 5 times per week and eating a variety of fresh fruits, vegetables, lean proteins limiting fatty food intake and carbohydrate intake  - I obtained labs at his last visit and discussed those with the patient today  - Patient is up-to-date on his colonoscopy  - Additional information provided in AVS

## 2023-08-17 NOTE — ASSESSMENT & PLAN NOTE
- Improving, DBP is borderline  - We will continue monitoring and if remains borderline, we will adjust his medication  - Continue amlodipine-valsartan  mg daily and metoprolol 50 mg twice daily

## 2023-08-17 NOTE — ASSESSMENT & PLAN NOTE
- Patient's last hemoglobin A1c was 7.7, which is in the diabetic range  - We will start metformin 500 mg daily and Ozempic 0.25 mg weekly and have patient follow-up in a month for reevaluation  -Patient will need a urine microalbumin at that visit  - We will discuss pneumonia vaccine, diabetic eye exam and do diabetic foot exam at that time

## 2023-08-17 NOTE — PATIENT INSTRUCTIONS

## 2023-08-17 NOTE — ASSESSMENT & PLAN NOTE
- Patient had a stent placed in 2019  - His LDL was slightly elevated but given his risk of CAD, he should be on a cholesterol-lowering medicine  - We are starting rosuvastatin 5 mg daily, will obtain labs in about 2 months

## 2023-08-17 NOTE — ASSESSMENT & PLAN NOTE
- Patient reports a history of low testosterone, had been on testosterone injections in the past  - Obtaining testosterone level today

## 2023-08-17 NOTE — PROGRESS NOTES
Office Note     Name: Terrence James    : 1975     MRN: [id number]     Chief Complaint  Hypertension (Pt also wants to discuss his labs )    Subjective     History of Present Illness:  Terrence James is a 48 y.o. male who presents today for checkup.    Hypertension: Patient's blood pressure is better controlled.  He is currently on amlodipine-valsartan  mg daily.  He is also taking metoprolol 50 mg twice daily.  His diastolic blood pressure is slightly elevated today.    Type 2 diabetes: Patient's last hemoglobin A1c was 7.7.  Patient has never had this diagnosis.  He has never been on metformin.  He is willing to try metformin today.  He would also like to try Ozempic because he is wanting to lose some weight.      HLD: Patient's last LDL was elevated.  He has a history of CAD and is not currently on a cholesterol-lowering medicine.  He would like to start this at this time.    Anxiety and depression: At his last visit, we started the patient on Wellbutrin 150 mg daily.  He would like to start an additional medicine for his anxiety.  He is willing to start BuSpar.    Low testosterone: Patient reports a history of low testosterone.  He would like to get his testosterone checked.    SH:  -Patient reports cutting back on his alcohol consumption  -He does not do anything special for diet or exercise    HM:  -Patient is up-to-date on his colonoscopy          Objective     Past Medical History:   Diagnosis Date    Anxiety     COVID-19 2021    DVT (deep venous thrombosis)     LEFT LEG     Hypertension     Wears reading eyeglasses      Past Surgical History:   Procedure Laterality Date    CARDIAC CATHETERIZATION  2019    CORONARY STENT PLACEMENT  2019    EAR TUBES Bilateral     FEMORAL THROMBECTOMY/EMBOLECTOMY Left 10/20/2021    Procedure: ULTRASOUND GUIDED ACCESS; LEFT LEG VENOGRAM; INTRAVENOUS ULTRASOUND OF FEMORAL VEIN, POPLITEAL VEIN, EXTERNAL AND COMMON ILIAC VEINS; LEFT POPLITEAL  "VEIN ANGIOPLASTY; LEFT COMMON ILIAC VEIN ANGIOPLASTY; POPLITEAL AND FEMORAL VEIN THROMBECTOMY - LEFT;  Surgeon: Arnel Gordon MD;  Location: [facility];  Service: Vascular;  Laterality: Left;  FLUORO:3 MIN 48 SEC  DOSE: 45 MGY  CONTRAS    GASTRIC SLEEVE LAPAROSCOPIC  03/2014    LUMBAR DISCECTOMY  2006    L3-4    VASECTOMY       Family History   Problem Relation Age of Onset    Vision loss Mother     Diabetes Mother     Kidney disease Mother     Skin cancer Mother         squamous cell    Heart attack Father     No Known Problems Sister     No Known Problems Sister     No Known Problems Brother     Other Maternal Grandmother         unknown    Other Maternal Grandfather         unknown    Other Paternal Grandmother         unknown    Other Paternal Grandfather         unknown    No Known Problems Daughter     No Known Problems Son        Vital Signs  /90 (BP Location: Left arm, Patient Position: Sitting, Cuff Size: Adult)   Pulse 88   Temp 98.3 øF (36.8 øC) (Infrared)   Ht 180.3 cm (71\")   Wt 110 kg (242 lb 3.2 oz)   SpO2 98%   BMI 33.78 kg/mý   Estimated body mass index is 33.78 kg/mý as calculated from the following:    Height as of this encounter: 180.3 cm (71\").    Weight as of this encounter: 110 kg (242 lb 3.2 oz).    Physical Exam  Vitals reviewed.   Constitutional:       Appearance: Normal appearance.   HENT:      Head: Normocephalic.      Right Ear: External ear normal.      Left Ear: External ear normal.      Nose: Nose normal.      Mouth/Throat:      Mouth: Mucous membranes are moist.   Eyes:      Extraocular Movements: Extraocular movements intact.   Cardiovascular:      Rate and Rhythm: Normal rate and regular rhythm.      Heart sounds: Normal heart sounds.   Pulmonary:      Effort: Pulmonary effort is normal. No respiratory distress.      Breath sounds: Normal breath sounds.   Abdominal:      General: Abdomen is flat.      Palpations: Abdomen is soft.   Musculoskeletal:      Cervical " back: No rigidity.      Comments: Moving all extremities spontaneously   Skin:     General: Skin is warm and dry.   Neurological:      General: No focal deficit present.      Mental Status: He is alert and oriented to person, place, and time.   Psychiatric:         Mood and Affect: Mood normal.         Behavior: Behavior normal.             Assessment and Plan     Diagnoses and all orders for this visit:    1. Annual physical exam (Primary)  Assessment & Plan:  - Discussed diet and exercise: At least 30 minutes of exercise 5 times per week and eating a variety of fresh fruits, vegetables, lean proteins limiting fatty food intake and carbohydrate intake  - I obtained labs at his last visit and discussed those with the patient today  - Patient is up-to-date on his colonoscopy  - Additional information provided in AVS      2. Type 2 diabetes mellitus with hyperglycemia, without long-term current use of insulin  Assessment & Plan:  - Patient's last hemoglobin A1c was 7.7, which is in the diabetic range  - We will start metformin 500 mg daily and Ozempic 0.25 mg weekly and have patient follow-up in a month for reevaluation  -Patient will need a urine microalbumin at that visit  - We will discuss pneumonia vaccine, diabetic eye exam and do diabetic foot exam at that time    Orders:  -     Semaglutide,0.25 or 0.5MG/DOS, (Ozempic, 0.25 or 0.5 MG/DOSE,) 2 MG/1.5ML solution pen-injector; Inject 0.25 mg under the skin into the appropriate area as directed 1 (One) Time Per Week.  Dispense: 1.5 mL; Refill: 0  -     metFORMIN ER (GLUCOPHAGE-XR) 500 MG 24 hr tablet; Take 1 tablet by mouth Daily With Breakfast.  Dispense: 30 tablet; Refill: 0    3. Anxiety and depression  Assessment & Plan:   - Improving  - Continue Wellbutrin 150 mg daily  - We will start BuSpar 10 mg twice daily (patient did request a benzo but I discussed that this is not a medication that I prescribed for anxiety)    Orders:  -     busPIRone (BUSPAR) 10 MG  tablet; Take 1 tablet by mouth 2 (Two) Times a Day.  Dispense: 60 tablet; Refill: 0    4. Primary hypertension  Assessment & Plan:  - Improving, DBP is borderline  - We will continue monitoring and if remains borderline, we will adjust his medication  - Continue amlodipine-valsartan  mg daily and metoprolol 50 mg twice daily    Orders:  -     amLODIPine-valsartan (EXFORGE)  MG per tablet; Take 1 tablet by mouth Daily.  Dispense: 30 tablet; Refill: 5    5. Coronary artery disease involving native coronary artery of native heart without angina pectoris  Assessment & Plan:   - Patient had a stent placed in 2019  - His LDL was slightly elevated but given his risk of CAD, he should be on a cholesterol-lowering medicine  - We are starting rosuvastatin 5 mg daily, will obtain labs in about 2 months    Orders:  -     rosuvastatin (Crestor) 5 MG tablet; Take 1 tablet by mouth Daily.  Dispense: 90 tablet; Refill: 0    6. Low testosterone  Assessment & Plan:  - Patient reports a history of low testosterone, had been on testosterone injections in the past  - Obtaining testosterone level today    Orders:  -     Testosterone, Free, Total; Future    Other orders  -     Discontinue: busPIRone (BUSPAR) 5 MG tablet; Take 2 tablets by mouth 2 (Two) Times a Day.  Dispense: 60 tablet; Refill: 0      BMI is >= 30 and <35. (Class 1 Obesity). The following options were offered after discussion;: weight loss educational material (shared in after visit summary), exercise counseling/recommendations, and nutrition counseling/recommendations    Follow Up  Return in about 4 weeks (around 9/14/2023) for follow up .    Laurita Rouse MD

## 2023-08-17 NOTE — ASSESSMENT & PLAN NOTE
- Improving  - Continue Wellbutrin 150 mg daily  - We will start BuSpar 10 mg twice daily (patient did request a benzo but I discussed that this is not a medication that I prescribed for anxiety)

## 2023-08-18 DIAGNOSIS — E11.65 TYPE 2 DIABETES MELLITUS WITH HYPERGLYCEMIA, WITHOUT LONG-TERM CURRENT USE OF INSULIN: ICD-10-CM

## 2023-08-18 RX ORDER — METFORMIN HYDROCHLORIDE 500 MG/1
500 TABLET, EXTENDED RELEASE ORAL
Qty: 90 TABLET | Refills: 0 | Status: SHIPPED | OUTPATIENT
Start: 2023-08-18

## 2023-08-22 DIAGNOSIS — I10 PRIMARY HYPERTENSION: ICD-10-CM

## 2023-08-22 NOTE — TELEPHONE ENCOUNTER
Medication was recently changed to 50 mg twice daily.  He was given a refill at that time and should have enough medication to make it to his next appointment.

## 2023-08-25 ENCOUNTER — PRIOR AUTHORIZATION (OUTPATIENT)
Dept: FAMILY MEDICINE CLINIC | Facility: CLINIC | Age: 48
End: 2023-08-25
Payer: COMMERCIAL

## 2023-08-25 NOTE — TELEPHONE ENCOUNTER
PA started today     Terrence James (Key: [id number])  Rx #: [id number]  Ozempic (0.25 or 0.5 MG/DOSE) 2MG/3ML pen-injectors

## 2023-08-28 ENCOUNTER — TELEPHONE (OUTPATIENT)
Dept: FAMILY MEDICINE CLINIC | Facility: CLINIC | Age: 48
End: 2023-08-28

## 2023-08-28 NOTE — TELEPHONE ENCOUNTER
Caller: DR Felton IBARRA    Relationship: Other    Best call back number: [phone]     What medications are you currently taking:   Current Outpatient Medications on File Prior to Visit   Medication Sig Dispense Refill    amLODIPine-valsartan (EXFORGE)  MG per tablet Take 1 tablet by mouth Daily. 30 tablet 5    buPROPion XL (Wellbutrin XL) 150 MG 24 hr tablet Take 1 tablet by mouth Daily. 30 tablet 0    busPIRone (BUSPAR) 10 MG tablet Take 1 tablet by mouth 2 (Two) Times a Day. 60 tablet 0    metFORMIN ER (GLUCOPHAGE-XR) 500 MG 24 hr tablet TAKE 1 TABLET BY MOUTH DAILY WITH BREAKFAST 90 tablet 0    metoprolol tartrate (LOPRESSOR) 50 MG tablet Take 1 tablet by mouth 2 (Two) Times a Day. 60 tablet 0    omeprazole (priLOSEC) 40 MG capsule Take 1 capsule by mouth Daily. 30 capsule 2    rosuvastatin (Crestor) 5 MG tablet Take 1 tablet by mouth Daily. 90 tablet 0    Semaglutide,0.25 or 0.5MG/DOS, (Ozempic, 0.25 or 0.5 MG/DOSE,) 2 MG/1.5ML solution pen-injector Inject 0.25 mg under the skin into the appropriate area as directed 1 (One) Time Per Week. 1.5 mL 0     No current facility-administered medications on file prior to visit.          Which medication are you concerned about: Semaglutide,0.25 or 0.5MG/DOS, (Ozempic, 0.25 or 0.5 MG/DOSE,) 2 MG/1.5ML solution pen-injector     Who prescribed you this medication: ANITRA HOLLEY MD     What are your concerns: GIVING A COURTESY CALL THAT THE PRIOR AUTHORIZATION FOR THE OZEMPIC HAS BEEN DENIED

## 2023-09-01 NOTE — TELEPHONE ENCOUNTER
Request Reference Number: [id number]. OZEMPIC INJ 2MG/3ML is denied for not meeting the prior authorization requirement(s). Details of this decision are in the notice attached below or have been faxed to you.

## 2023-09-06 DIAGNOSIS — F41.9 ANXIETY AND DEPRESSION: ICD-10-CM

## 2023-09-06 DIAGNOSIS — F32.A ANXIETY AND DEPRESSION: ICD-10-CM

## 2023-09-06 RX ORDER — BUPROPION HYDROCHLORIDE 150 MG/1
150 TABLET ORAL DAILY
Qty: 30 TABLET | Refills: 0 | Status: SHIPPED | OUTPATIENT
Start: 2023-09-06 | End: 2023-09-08 | Stop reason: SDUPTHER

## 2023-09-08 ENCOUNTER — OFFICE VISIT (OUTPATIENT)
Dept: FAMILY MEDICINE CLINIC | Facility: CLINIC | Age: 48
End: 2023-09-08
Payer: COMMERCIAL

## 2023-09-08 VITALS
HEIGHT: 71 IN | WEIGHT: 242 LBS | TEMPERATURE: 97.7 F | SYSTOLIC BLOOD PRESSURE: 134 MMHG | DIASTOLIC BLOOD PRESSURE: 96 MMHG | HEART RATE: 81 BPM | OXYGEN SATURATION: 97 % | BODY MASS INDEX: 33.88 KG/M2

## 2023-09-08 DIAGNOSIS — F41.9 ANXIETY AND DEPRESSION: ICD-10-CM

## 2023-09-08 DIAGNOSIS — F32.A ANXIETY AND DEPRESSION: ICD-10-CM

## 2023-09-08 DIAGNOSIS — I10 PRIMARY HYPERTENSION: ICD-10-CM

## 2023-09-08 DIAGNOSIS — E11.65 TYPE 2 DIABETES MELLITUS WITH HYPERGLYCEMIA, WITHOUT LONG-TERM CURRENT USE OF INSULIN: Primary | ICD-10-CM

## 2023-09-08 DIAGNOSIS — F10.20 UNCOMPLICATED ALCOHOL DEPENDENCE: ICD-10-CM

## 2023-09-08 LAB
ALBUMIN/CREATININE RATIO, URINE: <30
EXPIRATION DATE: NORMAL
Lab: NORMAL
POC CREATININE URINE: 200
POC MICROALBUMIN URINE: 10

## 2023-09-08 PROCEDURE — 82044 UR ALBUMIN SEMIQUANTITATIVE: CPT | Performed by: STUDENT IN AN ORGANIZED HEALTH CARE EDUCATION/TRAINING PROGRAM

## 2023-09-08 PROCEDURE — 99214 OFFICE O/P EST MOD 30 MIN: CPT | Performed by: STUDENT IN AN ORGANIZED HEALTH CARE EDUCATION/TRAINING PROGRAM

## 2023-09-08 RX ORDER — BUPROPION HYDROCHLORIDE 300 MG/1
300 TABLET ORAL DAILY
Qty: 30 TABLET | Refills: 5 | Status: SHIPPED | OUTPATIENT
Start: 2023-09-08 | End: 2023-09-08 | Stop reason: SDUPTHER

## 2023-09-08 RX ORDER — BUSPIRONE HYDROCHLORIDE 15 MG/1
15 TABLET ORAL 2 TIMES DAILY
Qty: 60 TABLET | Refills: 2 | Status: SHIPPED | OUTPATIENT
Start: 2023-09-08

## 2023-09-08 RX ORDER — LORAZEPAM 1 MG/1
TABLET ORAL
Qty: 7 TABLET | Refills: 0 | Status: SHIPPED | OUTPATIENT
Start: 2023-09-08 | End: 2023-09-11

## 2023-09-08 RX ORDER — BUPROPION HYDROCHLORIDE 300 MG/1
300 TABLET ORAL DAILY
Qty: 30 TABLET | Refills: 5 | Status: SHIPPED | OUTPATIENT
Start: 2023-09-08

## 2023-09-08 RX ORDER — METFORMIN HYDROCHLORIDE 500 MG/1
1000 TABLET, EXTENDED RELEASE ORAL
Qty: 60 TABLET | Refills: 2 | Status: SHIPPED | OUTPATIENT
Start: 2023-09-08 | End: 2023-09-08 | Stop reason: SDUPTHER

## 2023-09-08 RX ORDER — METFORMIN HYDROCHLORIDE 500 MG/1
1000 TABLET, EXTENDED RELEASE ORAL
Qty: 60 TABLET | Refills: 2 | Status: SHIPPED | OUTPATIENT
Start: 2023-09-08

## 2023-09-08 RX ORDER — BUSPIRONE HYDROCHLORIDE 15 MG/1
15 TABLET ORAL 2 TIMES DAILY
Qty: 60 TABLET | Refills: 2 | Status: SHIPPED | OUTPATIENT
Start: 2023-09-08 | End: 2023-09-08 | Stop reason: SDUPTHER

## 2023-09-08 NOTE — PROGRESS NOTES
Office Note     Name: Terrence James    : 1975     MRN: [id number]     Chief Complaint  Diabetes and Hypertension    Subjective     History of Present Illness:  Terrence James is a 48 y.o. male who presents today for diabetes, anxiety, and hypertension.    Diabetes: Patient's last hemoglobin A1c was 7.7.  We started the patient on metformin and he is taking 500 mg daily.  He is okay with increasing the dose.  Ozempic was not approved by his insurance.  Patient has not had an eye exam in 2 years, he has his own eye doctor that he will reach out to for diabetic eye exam.  Patient is okay with a urine microalbumin today.  Patient declines pneumonia vaccine.    Hypertension: Patient is currently on amlodipine-valsartan  mg daily and metoprolol 50 mg twice daily.  He reports compliance with these medications.  His diastolic blood pressure remains elevated.  He denies any headaches, dizziness, or blurry vision.  Denies any chest pain.    Anxiety: Patient is currently on Wellbutrin 150 mg daily and BuSpar 10 mg twice daily.  He has not noticed a difference with starting the BuSpar.  He would like to go up on the Wellbutrin.  He is okay with taking the BuSpar at a higher dose to see if it helps.    Alcohol dependence: Patient drinks 3-4 drinks of alcohol at least every other day.  He does want to try to get off of the alcohol and would like a benzodiazepine taper.  He reports that he has quit alcohol cold turkey in the past.  He denies any history of alcohol withdrawal seizures or DTs, that he would like to get on a benzodiazepine taper so that he can get off alcohol so that his blood pressure can improve.            Objective     Past Medical History:   Diagnosis Date    Anxiety     COVID-19 2021    DVT (deep venous thrombosis)     LEFT LEG     Hypertension     Wears reading eyeglasses      Past Surgical History:   Procedure Laterality Date    CARDIAC CATHETERIZATION  2019    CORONARY STENT  "PLACEMENT  08/2019    EAR TUBES Bilateral 2012    FEMORAL THROMBECTOMY/EMBOLECTOMY Left 10/20/2021    Procedure: ULTRASOUND GUIDED ACCESS; LEFT LEG VENOGRAM; INTRAVENOUS ULTRASOUND OF FEMORAL VEIN, POPLITEAL VEIN, EXTERNAL AND COMMON ILIAC VEINS; LEFT POPLITEAL VEIN ANGIOPLASTY; LEFT COMMON ILIAC VEIN ANGIOPLASTY; POPLITEAL AND FEMORAL VEIN THROMBECTOMY - LEFT;  Surgeon: Arnel Gordon MD;  Location: [facility];  Service: Vascular;  Laterality: Left;  FLUORO:3 MIN 48 SEC  DOSE: 45 MGY  CONTRAS    GASTRIC SLEEVE LAPAROSCOPIC  03/2014    LUMBAR DISCECTOMY  2006    L3-4    VASECTOMY       Family History   Problem Relation Age of Onset    Vision loss Mother     Diabetes Mother     Kidney disease Mother     Skin cancer Mother         squamous cell    Heart attack Father     No Known Problems Sister     No Known Problems Sister     No Known Problems Brother     Other Maternal Grandmother         unknown    Other Maternal Grandfather         unknown    Other Paternal Grandmother         unknown    Other Paternal Grandfather         unknown    No Known Problems Daughter     No Known Problems Son        Vital Signs  /96   Pulse 81   Temp 97.7 °F (36.5 °C) (Infrared)   Ht 180.3 cm (71\")   Wt 110 kg (242 lb)   SpO2 97%   BMI 33.75 kg/m²   Estimated body mass index is 33.78 kg/m² as calculated from the following:    Height as of 8/17/23: 180.3 cm (71\").    Weight as of 8/17/23: 110 kg (242 lb 3.2 oz).    Physical Exam  Vitals reviewed.   Constitutional:       Appearance: Normal appearance.   HENT:      Head: Normocephalic and atraumatic.      Right Ear: External ear normal.      Left Ear: External ear normal.      Nose: Nose normal.      Mouth/Throat:      Mouth: Mucous membranes are moist.   Eyes:      Conjunctiva/sclera: Conjunctivae normal.   Cardiovascular:      Rate and Rhythm: Normal rate and regular rhythm.   Pulmonary:      Effort: Pulmonary effort is normal.      Breath sounds: Normal breath sounds. "   Abdominal:      General: Abdomen is flat.      Palpations: Abdomen is soft.   Neurological:      Mental Status: He is alert.             Assessment and Plan     Diagnoses and all orders for this visit:    1. Type 2 diabetes mellitus with hyperglycemia, without long-term current use of insulin (Primary)  Assessment & Plan:  - Patient's last hemoglobin A1c was 7.7  -Increase metformin to 1000 mg daily  -Obtain urine microalbumin  -Patient will need repeat hemoglobin A1c at next visit  -Patient declines pneumonia vaccine today  - I discussed diabetic eye exam and patient said that he would reach out to his own optometrist and does not want a referral  -Patient will need diabetic foot exam      Orders:  -     POCT microalbumin  -     Discontinue: metFORMIN ER (GLUCOPHAGE-XR) 500 MG 24 hr tablet; Take 2 tablets by mouth Daily With Breakfast.  Dispense: 60 tablet; Refill: 2  -     metFORMIN ER (GLUCOPHAGE-XR) 500 MG 24 hr tablet; Take 2 tablets by mouth Daily With Breakfast.  Dispense: 60 tablet; Refill: 2    2. Primary hypertension  Assessment & Plan:  - Uncontrolled, DBP still elevated  - Patient is working on stopping alcohol consumption, which can be elevating his blood pressure  - Continue amlodipine-valsartan  mg daily and metoprolol 50 mg twice daily for now and will follow-up at next visit  - If blood pressure remains elevated, we will need to adjust his medication further likely go up to valsartan 320 mg daily      3. Anxiety and depression  Assessment & Plan:  - Uncontrolled, VIVIAN-7 of 16  - Increase Wellbutrin to 300 mg daily  - Increase BuSpar to 15 mg twice daily    Orders:  -     Discontinue: buPROPion XL (WELLBUTRIN XL) 300 MG 24 hr tablet; Take 1 tablet by mouth Daily.  Dispense: 30 tablet; Refill: 5  -     Discontinue: busPIRone (BUSPAR) 15 MG tablet; Take 1 tablet by mouth 2 (Two) Times a Day.  Dispense: 60 tablet; Refill: 2  -     buPROPion XL (WELLBUTRIN XL) 300 MG 24 hr tablet; Take 1 tablet by  mouth Daily.  Dispense: 30 tablet; Refill: 5  -     busPIRone (BUSPAR) 15 MG tablet; Take 1 tablet by mouth 2 (Two) Times a Day.  Dispense: 60 tablet; Refill: 2    4. Uncomplicated alcohol dependence  Assessment & Plan:  - Patient drinks 4-5 alcoholic drinks at least every other day for anxiety and would like to stop drinking so much  - I do hope that once he stops drinking alcohol, his blood pressure will improve  - I put him on an Ativan taper -  denies any history of withdrawal seizures or Dts -discussed with patient that I will no longer be prescribing the Ativan after this taper    Orders:  -     LORazepam (Ativan) 1 MG tablet; Take 1 tablet by mouth Every 6 (Six) Hours for 1 day, THEN 0.5 tablets Every 8 (Eight) Hours for 1 day, THEN 0.5 tablets Every 12 (Twelve) Hours for 1 day.  Dispense: 7 tablet; Refill: 0           Follow Up  Return in about 2 months (around 11/8/2023) for follow up diabetes.    Laurita Rouse MD

## 2023-09-08 NOTE — ASSESSMENT & PLAN NOTE
- Patient drinks 4-5 alcoholic drinks at least every other day for anxiety and would like to stop drinking so much  - I do hope that once he stops drinking alcohol, his blood pressure will improve  - I put him on an Ativan taper -  denies any history of withdrawal seizures or Dts -discussed with patient that I will no longer be prescribing the Ativan after this taper

## 2023-09-08 NOTE — ASSESSMENT & PLAN NOTE
- Patient's last hemoglobin A1c was 7.7  -Increase metformin to 1000 mg daily  -Obtain urine microalbumin  -Patient will need repeat hemoglobin A1c at next visit  -Patient declines pneumonia vaccine today  - I discussed diabetic eye exam and patient said that he would reach out to his own optometrist and does not want a referral  -Patient will need diabetic foot exam

## 2023-09-08 NOTE — ASSESSMENT & PLAN NOTE
- Uncontrolled, VIVIAN-7 of 16  - Increase Wellbutrin to 300 mg daily  - Increase BuSpar to 15 mg twice daily

## 2023-09-08 NOTE — ASSESSMENT & PLAN NOTE
- Uncontrolled, DBP still elevated  - Patient is working on stopping alcohol consumption, which can be elevating his blood pressure  - Continue amlodipine-valsartan  mg daily and metoprolol 50 mg twice daily for now and will follow-up at next visit  - If blood pressure remains elevated, we will need to adjust his medication further likely go up to valsartan 320 mg daily

## 2023-10-03 DIAGNOSIS — F32.A ANXIETY AND DEPRESSION: ICD-10-CM

## 2023-10-03 DIAGNOSIS — F41.9 ANXIETY AND DEPRESSION: ICD-10-CM

## 2023-10-03 RX ORDER — BUPROPION HYDROCHLORIDE 150 MG/1
150 TABLET ORAL DAILY
Qty: 30 TABLET | Refills: 0 | OUTPATIENT
Start: 2023-10-03

## 2023-10-03 NOTE — TELEPHONE ENCOUNTER
Rx Refill Note  Requested Prescriptions     Pending Prescriptions Disp Refills    buPROPion XL (WELLBUTRIN XL) 150 MG 24 hr tablet [Pharmacy Med Name: BUPROPION XL 150MG TABLETS (24 H)] 30 tablet 0     Sig: TAKE 1 TABLET BY MOUTH DAILY      Last office visit with prescribing clinician: 9/8/2023   Last telemedicine visit with prescribing clinician: Visit date not found   Next office visit with prescribing clinician: Visit date not found                         Would you like a call back once the refill request has been completed: [] Yes [] No    If the office needs to give you a call back, can they leave a voicemail: [] Yes [] No    Irina Toney MA  10/03/23, 16:05 EDT

## 2023-11-08 ENCOUNTER — OFFICE VISIT (OUTPATIENT)
Dept: FAMILY MEDICINE CLINIC | Facility: CLINIC | Age: 48
End: 2023-11-08
Payer: COMMERCIAL

## 2023-11-08 ENCOUNTER — LAB (OUTPATIENT)
Dept: LAB | Facility: HOSPITAL | Age: 48
End: 2023-11-08
Payer: COMMERCIAL

## 2023-11-08 VITALS
WEIGHT: 238 LBS | DIASTOLIC BLOOD PRESSURE: 100 MMHG | OXYGEN SATURATION: 98 % | HEART RATE: 120 BPM | HEIGHT: 71 IN | BODY MASS INDEX: 33.32 KG/M2 | TEMPERATURE: 98.6 F | SYSTOLIC BLOOD PRESSURE: 150 MMHG

## 2023-11-08 DIAGNOSIS — N52.03 COMBINED ARTERIAL INSUFFICIENCY AND CORPORO-VENOUS OCCLUSIVE ERECTILE DYSFUNCTION: ICD-10-CM

## 2023-11-08 DIAGNOSIS — F41.9 ANXIETY AND DEPRESSION: ICD-10-CM

## 2023-11-08 DIAGNOSIS — I10 PRIMARY HYPERTENSION: ICD-10-CM

## 2023-11-08 DIAGNOSIS — E78.5 HYPERLIPIDEMIA LDL GOAL <70: ICD-10-CM

## 2023-11-08 DIAGNOSIS — I10 PRIMARY HYPERTENSION: Primary | ICD-10-CM

## 2023-11-08 DIAGNOSIS — R79.89 LOW TESTOSTERONE: ICD-10-CM

## 2023-11-08 DIAGNOSIS — F10.20 UNCOMPLICATED ALCOHOL DEPENDENCE: ICD-10-CM

## 2023-11-08 DIAGNOSIS — F32.A ANXIETY AND DEPRESSION: ICD-10-CM

## 2023-11-08 DIAGNOSIS — E11.65 TYPE 2 DIABETES MELLITUS WITH HYPERGLYCEMIA, WITHOUT LONG-TERM CURRENT USE OF INSULIN: ICD-10-CM

## 2023-11-08 DIAGNOSIS — I10 HYPERTENSION, UNSPECIFIED TYPE: ICD-10-CM

## 2023-11-08 LAB
ALBUMIN SERPL-MCNC: 4.7 G/DL (ref 3.5–5.2)
ALBUMIN/GLOB SERPL: 1.3 G/DL
ALP SERPL-CCNC: 90 U/L (ref 39–117)
ALT SERPL W P-5'-P-CCNC: 22 U/L (ref 1–41)
ANION GAP SERPL CALCULATED.3IONS-SCNC: 14 MMOL/L (ref 5–15)
AST SERPL-CCNC: 35 U/L (ref 1–40)
BILIRUB SERPL-MCNC: 0.6 MG/DL (ref 0–1.2)
BUN SERPL-MCNC: 11 MG/DL (ref 6–20)
BUN/CREAT SERPL: 8 (ref 7–25)
CALCIUM SPEC-SCNC: 9.2 MG/DL (ref 8.6–10.5)
CHLORIDE SERPL-SCNC: 103 MMOL/L (ref 98–107)
CO2 SERPL-SCNC: 23 MMOL/L (ref 22–29)
CREAT SERPL-MCNC: 1.37 MG/DL (ref 0.76–1.27)
EGFRCR SERPLBLD CKD-EPI 2021: 63.2 ML/MIN/1.73
GLOBULIN UR ELPH-MCNC: 3.7 GM/DL
GLUCOSE SERPL-MCNC: 131 MG/DL (ref 65–99)
HBA1C MFR BLD: 7 % (ref 4.8–5.6)
POTASSIUM SERPL-SCNC: 4.2 MMOL/L (ref 3.5–5.2)
PROT SERPL-MCNC: 8.4 G/DL (ref 6–8.5)
SODIUM SERPL-SCNC: 140 MMOL/L (ref 136–145)

## 2023-11-08 PROCEDURE — 80053 COMPREHEN METABOLIC PANEL: CPT

## 2023-11-08 PROCEDURE — 99214 OFFICE O/P EST MOD 30 MIN: CPT | Performed by: FAMILY MEDICINE

## 2023-11-08 PROCEDURE — 84402 ASSAY OF FREE TESTOSTERONE: CPT

## 2023-11-08 PROCEDURE — 36415 COLL VENOUS BLD VENIPUNCTURE: CPT

## 2023-11-08 PROCEDURE — 83036 HEMOGLOBIN GLYCOSYLATED A1C: CPT

## 2023-11-08 PROCEDURE — 84403 ASSAY OF TOTAL TESTOSTERONE: CPT

## 2023-11-08 RX ORDER — HYDROXYZINE HYDROCHLORIDE 25 MG/1
TABLET, FILM COATED ORAL
COMMUNITY
Start: 2023-11-02

## 2023-11-08 RX ORDER — SILDENAFIL 25 MG/1
TABLET, FILM COATED ORAL
Qty: 30 TABLET | Refills: 6 | Status: SHIPPED | OUTPATIENT
Start: 2023-11-08

## 2023-11-08 RX ORDER — METOPROLOL TARTRATE 50 MG/1
50 TABLET, FILM COATED ORAL 2 TIMES DAILY
Qty: 180 TABLET | Refills: 3 | Status: SHIPPED | OUTPATIENT
Start: 2023-11-08

## 2023-11-08 RX ORDER — METOPROLOL TARTRATE 50 MG/1
50 TABLET, FILM COATED ORAL 2 TIMES DAILY
Qty: 180 TABLET | OUTPATIENT
Start: 2023-11-08

## 2023-11-08 NOTE — ASSESSMENT & PLAN NOTE
Blood pressure and heart rate would likely improve with alcohol cessation.  Patient was offered referral to addiction treatment specialist but he declines at this time.  Patient was encouraged to continue to wean down alcohol use.

## 2023-11-08 NOTE — ASSESSMENT & PLAN NOTE
Lipid abnormalities are  likely improving with Crestor, diet, and exercise.   Patient has eaten today so we will recheck his cholesterol levels at his follow-up visit.  Lipids will be reassessed in 3 months.

## 2023-11-08 NOTE — ASSESSMENT & PLAN NOTE
Diabetes is  likely improving with metformin .   We will recheck A1c today and if it continues to be elevated we will call in an order for Ozempic to see if we get his insurance to cover.  Patient will continue to work on diet and exercise.  Diabetes will be reassessed in 3 months.

## 2023-11-08 NOTE — PROGRESS NOTES
Terrence James is a 48 y.o. male who presents today for Diabetes, Hyperlipidemia, and Hypertension      Patient is here for follow-up. He completed ativan taper and quit drinking for about 5 days. He has gone back to drinking again and has been drinking 2 drinks before bed nightly. He has been tolerating the increased dose on Buspar and Wellbutrin and feels like they are working well for him. He has not been checking his blood pressure at home. He has been taking amlodipine-valsartan daily but ran out of metoprolol a couple of weeks ago. He has tolerated the increase in the metformin dose well. He does not check his sugar at home. He exercises a couple of times a week on the exercise bike. He has been eating a generally healthy and varied diet. He eats small meals since having gastric sleeve surgery. He does try to limit cabs and sugars. He takes crestor daily. He tolerates medications well with no side effects. Patient has been getting viagra online for ED. It has been working well for him. He can get an erection but has trouble maintaining. No difficulty with ejaculation. He would like a refill today. He has no acute complaints today.          Review of Systems   Constitutional:  Negative for diaphoresis, fatigue, fever and unexpected weight loss.   HENT:  Negative for congestion, ear pain and sore throat.    Eyes:  Negative for visual disturbance.   Respiratory:  Negative for cough, shortness of breath and wheezing.    Cardiovascular:  Negative for chest pain, palpitations and leg swelling.   Gastrointestinal:  Negative for abdominal pain, blood in stool, constipation, diarrhea, nausea, vomiting and GERD.   Endocrine: Negative for polydipsia and polyuria.   Genitourinary:  Negative for difficulty urinating.   Musculoskeletal:  Negative for joint swelling.   Skin:  Negative for rash and skin lesions.   Allergic/Immunologic: Negative for environmental allergies.   Neurological:  Negative for dizziness, seizures,  "syncope, weakness, light-headedness and headache.   Hematological:  Does not bruise/bleed easily.   Psychiatric/Behavioral:  Negative for suicidal ideas.         The following portions of the patient's history were reviewed and updated as appropriate: allergies, current medications, past family history, past medical history, past social history, past surgical history and problem list.    Current Outpatient Medications on File Prior to Visit   Medication Sig Dispense Refill    amLODIPine-valsartan (EXFORGE)  MG per tablet Take 1 tablet by mouth Daily. 30 tablet 5    buPROPion XL (WELLBUTRIN XL) 300 MG 24 hr tablet Take 1 tablet by mouth Daily. 30 tablet 5    busPIRone (BUSPAR) 15 MG tablet Take 1 tablet by mouth 2 (Two) Times a Day. 60 tablet 2    hydrOXYzine (ATARAX) 25 MG tablet       metFORMIN ER (GLUCOPHAGE-XR) 500 MG 24 hr tablet Take 2 tablets by mouth Daily With Breakfast. 60 tablet 2    omeprazole (priLOSEC) 40 MG capsule Take 1 capsule by mouth Daily. 30 capsule 2    rosuvastatin (Crestor) 5 MG tablet Take 1 tablet by mouth Daily. 90 tablet 0    [DISCONTINUED] metoprolol tartrate (LOPRESSOR) 50 MG tablet Take 1 tablet by mouth 2 (Two) Times a Day. 60 tablet 0     No current facility-administered medications on file prior to visit.       No Known Allergies     Visit Vitals  /100 (BP Location: Left arm, Patient Position: Sitting, Cuff Size: Adult)   Pulse 120   Temp 98.6 °F (37 °C)   Ht 180.3 cm (71\")   Wt 108 kg (238 lb)   SpO2 98%   BMI 33.19 kg/m²        Physical Exam  Constitutional:       General: He is not in acute distress.     Appearance: He is well-developed. He is not diaphoretic.   HENT:      Head: Atraumatic.   Cardiovascular:      Rate and Rhythm: Regular rhythm. Tachycardia present.      Pulses: Normal pulses.      Heart sounds: Normal heart sounds. No murmur heard.     No friction rub. No gallop.   Pulmonary:      Effort: Pulmonary effort is normal. No respiratory distress.      " Breath sounds: Normal breath sounds. No stridor. No wheezing, rhonchi or rales.   Musculoskeletal:      Cervical back: Normal range of motion and neck supple.   Skin:     General: Skin is warm and dry.   Neurological:      Mental Status: He is alert and oriented to person, place, and time.   Psychiatric:         Behavior: Behavior normal.          Results for orders placed or performed in visit on 09/08/23   POCT microalbumin    Specimen: Urine   Result Value Ref Range    Microalbumin, Urine 10     Creatinine, Urine 200     Lot Number [id number]     Expiration Date 11/07/23     Albumin/Creatinine Ratio, Urine <30         Problems Addressed this Visit          Cardiac and Vasculature    Hypertension - Primary     Hypertension is  worsening as he has been out of metoprolol for over a week.  Patient's heart rate is also elevated today but he is asymptomatic at this time.  Patient was encouraged to get a blood pressure cuff to monitor blood pressure and heart rate at home.  He will restart metoprolol and continue amlodipine and valsartan.  Patient will contact the clinic tomorrow or Friday with his blood pressure numbers and heart rate numbers.  If they have not improved we will likely increase metoprolol.  RTC/ED precautions given.  Blood pressure will be reassessed in 3 months.         Relevant Medications    metoprolol tartrate (LOPRESSOR) 50 MG tablet    Other Relevant Orders    Comprehensive Metabolic Panel    Hemoglobin A1c    Hyperlipidemia LDL goal <70     Lipid abnormalities are  likely improving with Crestor, diet, and exercise.   Patient has eaten today so we will recheck his cholesterol levels at his follow-up visit.  Lipids will be reassessed in 3 months.         Relevant Orders    Comprehensive Metabolic Panel       Endocrine and Metabolic    Type 2 diabetes mellitus with hyperglycemia, without long-term current use of insulin     Diabetes is  likely improving with metformin .   We will recheck A1c  today and if it continues to be elevated we will call in an order for Ozempic to see if we get his insurance to cover.  Patient will continue to work on diet and exercise.  Diabetes will be reassessed in 3 months.         Relevant Orders    Comprehensive Metabolic Panel    Hemoglobin A1c       Genitourinary and Reproductive     Combined arterial insufficiency and corporo-venous occlusive erectile dysfunction     Chronic and well controlled with Viagra.  Patient was given refill on Viagra.  RTC/ED precautions given.         Relevant Medications    sildenafil (Viagra) 25 MG tablet       Mental Health    Anxiety and depression     Chronic and well-controlled with current medications.  Patient will continue current treatment plan.         Relevant Medications    hydrOXYzine (ATARAX) 25 MG tablet    Uncomplicated alcohol dependence     Blood pressure and heart rate would likely improve with alcohol cessation.  Patient was offered referral to addiction treatment specialist but he declines at this time.  Patient was encouraged to continue to wean down alcohol use.          Diagnoses         Codes Comments    Primary hypertension    -  Primary ICD-10-CM: I10  ICD-9-CM: 401.9     Type 2 diabetes mellitus with hyperglycemia, without long-term current use of insulin     ICD-10-CM: E11.65  ICD-9-CM: 250.00, 790.29     Hyperlipidemia LDL goal <70     ICD-10-CM: E78.5  ICD-9-CM: 272.4     Combined arterial insufficiency and corporo-venous occlusive erectile dysfunction     ICD-10-CM: N52.03  ICD-9-CM: 607.84     Anxiety and depression     ICD-10-CM: F41.9, F32.A  ICD-9-CM: 300.00, 311     Uncomplicated alcohol dependence     ICD-10-CM: F10.20  ICD-9-CM: 303.90             Return in about 3 months (around 2/8/2024) for Follow-up DM, HTN, HLD.    Pino Borrero MD   11/8/2023

## 2023-11-08 NOTE — TELEPHONE ENCOUNTER
Rx Refill Note  Requested Prescriptions     Pending Prescriptions Disp Refills    metoprolol tartrate (LOPRESSOR) 50 MG tablet [Pharmacy Med Name: METOPROLOL TARTRATE 50MG TABLETS] 180 tablet      Sig: TAKE 1 TABLET BY MOUTH TWICE DAILY      Last office visit with prescribing clinician: 9/8/2023   Last telemedicine visit with prescribing clinician: Visit date not found   Next office visit with prescribing clinician: Visit date not found                         Would you like a call back once the refill request has been completed: [] Yes [] No    If the office needs to give you a call back, can they leave a voicemail: [] Yes [] No    Irina Toney MA  11/08/23, 15:10 EST

## 2023-11-08 NOTE — ASSESSMENT & PLAN NOTE
Hypertension is  worsening as he has been out of metoprolol for over a week.  Patient's heart rate is also elevated today but he is asymptomatic at this time.  Patient was encouraged to get a blood pressure cuff to monitor blood pressure and heart rate at home.  He will restart metoprolol and continue amlodipine and valsartan.  Patient will contact the clinic tomorrow or Friday with his blood pressure numbers and heart rate numbers.  If they have not improved we will likely increase metoprolol.  RTC/ED precautions given.  Blood pressure will be reassessed in 3 months.

## 2023-11-08 NOTE — ASSESSMENT & PLAN NOTE
Chronic and well-controlled with current medications.  Patient will continue current treatment plan.

## 2023-11-08 NOTE — ASSESSMENT & PLAN NOTE
Chronic and well controlled with Viagra.  Patient was given refill on Viagra.  RTC/ED precautions given.

## 2023-11-12 LAB
TESTOST FREE SERPL-MCNC: 4.5 PG/ML (ref 6.8–21.5)
TESTOST SERPL-MCNC: 297 NG/DL (ref 264–916)

## 2023-11-14 DIAGNOSIS — I25.10 CORONARY ARTERY DISEASE INVOLVING NATIVE CORONARY ARTERY OF NATIVE HEART WITHOUT ANGINA PECTORIS: ICD-10-CM

## 2023-11-14 RX ORDER — ROSUVASTATIN CALCIUM 5 MG/1
5 TABLET, COATED ORAL DAILY
Qty: 90 TABLET | Refills: 0 | Status: SHIPPED | OUTPATIENT
Start: 2023-11-14

## 2023-12-01 ENCOUNTER — TELEPHONE (OUTPATIENT)
Dept: FAMILY MEDICINE CLINIC | Facility: CLINIC | Age: 48
End: 2023-12-01
Payer: COMMERCIAL

## 2023-12-01 NOTE — TELEPHONE ENCOUNTER
HUB TO RELAY  LVM    Please let the patient know that labs that were drawn at previous visit were stable except for creatinine which is increased slightly from 1.23-1.37 and hemoglobin A1c which is improved from 7.7-7.0.  Creatinine is a measure of kidney function be elevated for many different reasons the cause kidney injury including medication side effects, dehydration, and others.  I recommend the patient avoid NSAID medications and try to drink at least 8 to 12 cups of water daily.  Will recheck his kidney function at his follow-up visit.  With patient's A1c still being 7 I think it be appropriate for him to start Ozempic as we discussed during his most recent office visit.  If the patient is agreeable to this please let me know and I will call it into his pharmacy.  Patient should otherwise continue current treatment plan.  If patient has any questions they can contact me.

## 2023-12-18 DIAGNOSIS — K21.9 GASTROESOPHAGEAL REFLUX DISEASE, UNSPECIFIED WHETHER ESOPHAGITIS PRESENT: ICD-10-CM

## 2023-12-18 RX ORDER — OMEPRAZOLE 40 MG/1
40 CAPSULE, DELAYED RELEASE ORAL DAILY
Qty: 30 CAPSULE | Refills: 2 | Status: SHIPPED | OUTPATIENT
Start: 2023-12-18

## 2024-02-02 DIAGNOSIS — I10 PRIMARY HYPERTENSION: ICD-10-CM

## 2024-02-02 RX ORDER — AMLODIPINE AND VALSARTAN 10; 160 MG/1; MG/1
1 TABLET ORAL DAILY
Qty: 30 TABLET | Refills: 5 | Status: SHIPPED | OUTPATIENT
Start: 2024-02-02 | End: 2025-02-01

## 2024-02-07 DIAGNOSIS — I25.10 CORONARY ARTERY DISEASE INVOLVING NATIVE CORONARY ARTERY OF NATIVE HEART WITHOUT ANGINA PECTORIS: ICD-10-CM

## 2024-02-07 RX ORDER — ROSUVASTATIN CALCIUM 5 MG/1
5 TABLET, COATED ORAL DAILY
Qty: 90 TABLET | Refills: 0 | Status: SHIPPED | OUTPATIENT
Start: 2024-02-07

## 2024-03-19 DIAGNOSIS — K21.9 GASTROESOPHAGEAL REFLUX DISEASE, UNSPECIFIED WHETHER ESOPHAGITIS PRESENT: ICD-10-CM

## 2024-03-19 RX ORDER — OMEPRAZOLE 40 MG/1
40 CAPSULE, DELAYED RELEASE ORAL DAILY
Qty: 30 CAPSULE | Refills: 2 | Status: SHIPPED | OUTPATIENT
Start: 2024-03-19

## 2024-04-08 DIAGNOSIS — I10 PRIMARY HYPERTENSION: ICD-10-CM

## 2024-04-08 NOTE — TELEPHONE ENCOUNTER
Medication was increased to 50 mg twice daily at his office visit in November.  Patient should have plenty refills as he was given a years worth of refill.  Patient is due for an office visit to have blood pressure assessed.

## 2024-05-07 DIAGNOSIS — I10 PRIMARY HYPERTENSION: ICD-10-CM

## 2024-05-07 RX ORDER — AMLODIPINE AND VALSARTAN 10; 160 MG/1; MG/1
1 TABLET ORAL DAILY
Qty: 30 TABLET | Refills: 2 | Status: SHIPPED | OUTPATIENT
Start: 2024-05-07 | End: 2025-05-07

## 2024-05-21 ENCOUNTER — HOSPITAL ENCOUNTER (EMERGENCY)
Facility: HOSPITAL | Age: 49
Discharge: LEFT BEFORE DISCHARGE | End: 2024-05-22
Attending: EMERGENCY MEDICINE | Admitting: EMERGENCY MEDICINE
Payer: COMMERCIAL

## 2024-05-21 VITALS
OXYGEN SATURATION: 97 % | TEMPERATURE: 97.6 F | RESPIRATION RATE: 18 BRPM | SYSTOLIC BLOOD PRESSURE: 124 MMHG | BODY MASS INDEX: 31.5 KG/M2 | HEART RATE: 93 BPM | WEIGHT: 225 LBS | HEIGHT: 71 IN | DIASTOLIC BLOOD PRESSURE: 78 MMHG

## 2024-05-21 DIAGNOSIS — R10.9 FLANK PAIN: ICD-10-CM

## 2024-05-21 DIAGNOSIS — R10.84 GENERALIZED ABDOMINAL PAIN: Primary | ICD-10-CM

## 2024-05-21 PROCEDURE — 99283 EMERGENCY DEPT VISIT LOW MDM: CPT

## 2024-05-21 RX ORDER — ONDANSETRON 2 MG/ML
4 INJECTION INTRAMUSCULAR; INTRAVENOUS ONCE
Status: DISCONTINUED | OUTPATIENT
Start: 2024-05-21 | End: 2024-05-22 | Stop reason: HOSPADM

## 2024-05-21 RX ORDER — MORPHINE SULFATE 4 MG/ML
4 INJECTION, SOLUTION INTRAMUSCULAR; INTRAVENOUS ONCE
Status: DISCONTINUED | OUTPATIENT
Start: 2024-05-21 | End: 2024-05-22 | Stop reason: HOSPADM

## 2024-05-21 RX ORDER — SODIUM CHLORIDE 0.9 % (FLUSH) 0.9 %
10 SYRINGE (ML) INJECTION AS NEEDED
Status: DISCONTINUED | OUTPATIENT
Start: 2024-05-21 | End: 2024-05-22 | Stop reason: HOSPADM

## 2024-05-22 LAB
BACTERIA UR QL AUTO: ABNORMAL /HPF
BILIRUB UR QL STRIP: NEGATIVE
CLARITY UR: CLEAR
COLOR UR: YELLOW
GLUCOSE UR STRIP-MCNC: NEGATIVE MG/DL
HGB UR QL STRIP.AUTO: NEGATIVE
HYALINE CASTS UR QL AUTO: ABNORMAL /LPF
KETONES UR QL STRIP: ABNORMAL
LEUKOCYTE ESTERASE UR QL STRIP.AUTO: ABNORMAL
NITRITE UR QL STRIP: NEGATIVE
PH UR STRIP.AUTO: <=5 [PH] (ref 5–8)
PROT UR QL STRIP: ABNORMAL
RBC # UR STRIP: ABNORMAL /HPF
REF LAB TEST METHOD: ABNORMAL
SP GR UR STRIP: 1.02 (ref 1–1.03)
SQUAMOUS #/AREA URNS HPF: ABNORMAL /HPF
UROBILINOGEN UR QL STRIP: ABNORMAL
WBC # UR STRIP: ABNORMAL /HPF

## 2024-05-22 PROCEDURE — 81001 URINALYSIS AUTO W/SCOPE: CPT | Performed by: EMERGENCY MEDICINE

## 2024-05-22 NOTE — ED PROVIDER NOTES
Subjective   History of Present Illness  This is a 48-year-old male past medical history of hypertension presented to the emergency department with some right-sided abdominal discomfort.  Patient had pain that started this morning.  He states that he woke up and started having some pain in his right upper quadrant.  It is sharp in nature.  He felt he was feeling stabbed.  States that he has remained in the area since then.  He has been nauseous as well.  He vomited 1 time.  No hematemesis.  No diarrhea.  Denies any fevers or chills.  No headache or change in vision.  No focal weakness.  No chest pain or shortness of breath.    History provided by:  Patient   used: No        Review of Systems   Constitutional:  Negative for chills and fever.   HENT:  Negative for congestion, ear pain and sore throat.    Eyes:  Negative for visual disturbance.   Respiratory:  Negative for shortness of breath.    Cardiovascular:  Negative for chest pain.   Gastrointestinal:  Positive for abdominal pain, nausea and vomiting.   Genitourinary:  Negative for difficulty urinating.   Musculoskeletal:  Negative for arthralgias.   Skin:  Negative for rash.   Neurological:  Negative for dizziness, weakness and numbness.   Psychiatric/Behavioral:  Negative for agitation.        Past Medical History:   Diagnosis Date    Anxiety     COVID-19 08/2021    DVT (deep venous thrombosis)     LEFT LEG     Hypertension     Wears reading eyeglasses        No Known Allergies    Past Surgical History:   Procedure Laterality Date    CARDIAC CATHETERIZATION  08/2019    CORONARY STENT PLACEMENT  08/2019    EAR TUBES Bilateral 2012    FEMORAL THROMBECTOMY/EMBOLECTOMY Left 10/20/2021    Procedure: ULTRASOUND GUIDED ACCESS; LEFT LEG VENOGRAM; INTRAVENOUS ULTRASOUND OF FEMORAL VEIN, POPLITEAL VEIN, EXTERNAL AND COMMON ILIAC VEINS; LEFT POPLITEAL VEIN ANGIOPLASTY; LEFT COMMON ILIAC VEIN ANGIOPLASTY; POPLITEAL AND FEMORAL VEIN THROMBECTOMY - LEFT;   Surgeon: Arnel Gordon MD;  Location: [facility];  Service: Vascular;  Laterality: Left;  FLUORO:3 MIN 48 SEC  DOSE: 45 MGY  CONTRAS    GASTRIC SLEEVE LAPAROSCOPIC  03/2014    LUMBAR DISCECTOMY  2006    L3-4    VASECTOMY         Family History   Problem Relation Age of Onset    Vision loss Mother     Diabetes Mother     Kidney disease Mother     Skin cancer Mother         squamous cell    Heart attack Father     No Known Problems Sister     No Known Problems Sister     No Known Problems Brother     Other Maternal Grandmother         unknown    Other Maternal Grandfather         unknown    Other Paternal Grandmother         unknown    Other Paternal Grandfather         unknown    No Known Problems Daughter     No Known Problems Son        Social History     Socioeconomic History    Marital status: Single   Tobacco Use    Smoking status: Former     Current packs/day: 0.00     Average packs/day: 0.3 packs/day for 22.0 years (5.5 ttl pk-yrs)     Types: Cigarettes     Start date: 1999     Quit date: 2021     Years since quitting: 3.3    Smokeless tobacco: Never   Vaping Use    Vaping status: Never Used   Substance and Sexual Activity    Alcohol use: Yes     Comment: ~2-3 DRINK WEEKLY     Drug use: Never    Sexual activity: Yes     Partners: Female     Comment: 5-6 female partners in the last year.           Objective   Physical Exam  Vitals and nursing note reviewed.   Constitutional:       General: He is not in acute distress.     Appearance: He is not ill-appearing or toxic-appearing.   HENT:      Mouth/Throat:      Pharynx: No posterior oropharyngeal erythema.   Eyes:      Extraocular Movements: Extraocular movements intact.      Pupils: Pupils are equal, round, and reactive to light.   Cardiovascular:      Rate and Rhythm: Normal rate and regular rhythm.   Pulmonary:      Effort: Pulmonary effort is normal. No respiratory distress.   Abdominal:      General: Abdomen is flat. There is no distension.       Palpations: There is no mass.      Tenderness: There is abdominal tenderness in the right upper quadrant and epigastric area. There is no guarding or rebound.   Musculoskeletal:         General: No deformity. Normal range of motion.   Neurological:      General: No focal deficit present.      Mental Status: He is alert.      Sensory: No sensory deficit.      Motor: No weakness.         Procedures           ED Course  ED Course as of 05/22/24 0659   Tue May 21, 2024   2345 BP: 124/78 [JK]   2345 Temp: 97.6 °F (36.4 °C) [JK]   2345 Temp src: Oral [JK]   2345 Heart Rate: 93 [JK]   2345 Resp: 18 [JK]   2345 SpO2: 97 %  Interpretation:  Patient's repeat vitals, telemetry tracing, and pulse oximetry tracing were directly viewed and interpreted by myself.  Normal sinus rhythm [JK]   Wed May 22, 2024   0658 Urinalysis With Microscopic If Indicated (No Culture) - Urine, Clean Catch(!)  Interpretation:  Laboratory studies were reviewed and interpreted directly by myself.  Urinalysis showed some minor leukocytes [JK]   0659 Upon going to reevaluate the patient, the nurse informed me the patient decided to leave without completing treatment. Although their full medical workup could not be completed, the patient proceeded to leave. The patient was not given the details of their results or plan for medical management. [JK]      ED Course User Index  [JK] Marcos Turcios MD                                             Medical Decision Making  This is a 48-year-old male with a history of hypertension presented to the emergency department with some right upper abdominal discomfort.  Patient symptoms seem consistent with biliary colic, dyspepsia and possibly nephrolithiasis.  Patient has some tenderness in the area on exam, however there is no evidence of acute abdomen or peritonitis.  Overall the patient is nontoxic.  Hemodynamically stable.  Afebrile.  IV access was established and the patient.  Provided symptomatic treatment.   Workup initiated.      Differential diagnosis: Peptic ulcer disease, dyspepsia, GERD, pancreatitis, biliary colic, electrolyte abnormality, acute kidney injury, nephrolithiasis, bowel obstruction      Amount and/or Complexity of Data Reviewed  External Data Reviewed: labs, radiology and notes.     Details: External laboratories, imaging as well as notes were reviewed personally by myself.  All relevant studies were used to guide decision making.     Date of previous record: 8/25/2021    Source of note: Admission record    Summary: Patient was seen for DVT and vascular surgery.  I did review based laboratory studies on file as well as previous venogram.  Records reviewed    Labs: ordered. Decision-making details documented in ED Course.  Radiology: ordered and independent interpretation performed. Decision-making details documented in ED Course.    Risk  Prescription drug management.        Final diagnoses:   Generalized abdominal pain   Flank pain       ED Disposition  ED Disposition       ED Disposition   Eloped    Condition   --    Comment   --               Pino Borrero MD  [address] [place] [address]  [phone]    Call in 1 day           Medication List      No changes were made to your prescriptions during this visit.            Marcos Turcios MD  05/22/24 0650       Marcos Turcios MD  05/22/24 0659

## 2024-05-28 ENCOUNTER — READMISSION MANAGEMENT (OUTPATIENT)
Dept: CALL CENTER | Facility: HOSPITAL | Age: 49
End: 2024-05-28
Payer: COMMERCIAL

## 2024-05-28 NOTE — OUTREACH NOTE
Prep Survey      Flowsheet Row Responses   [facility] facility patient discharged from? Non-[facility]   Is LACE score < 7 ? Non-BH Discharge   Eligibility [facility]   Date of Admission 05/22/24   Date of Discharge 05/28/24   Discharge Disposition Home or Self Care   Discharge diagnosis Acute pancreatitis,   Does the patient have one of the following disease processes/diagnoses(primary or secondary)? Other   Does the patient have Home health ordered? No   Is there a DME ordered? No   Prep survey completed? Yes            CAROLA JOHNSON - Registered Nurse

## 2024-05-29 ENCOUNTER — TRANSITIONAL CARE MANAGEMENT TELEPHONE ENCOUNTER (OUTPATIENT)
Dept: CALL CENTER | Facility: HOSPITAL | Age: 49
End: 2024-05-29
Payer: COMMERCIAL

## 2024-05-29 ENCOUNTER — HOSPITAL ENCOUNTER (INPATIENT)
Facility: HOSPITAL | Age: 49
LOS: 2 days | Discharge: HOME | DRG: 093 | End: 2024-05-31
Attending: EMERGENCY MEDICINE | Admitting: INTERNAL MEDICINE
Payer: COMMERCIAL

## 2024-05-29 ENCOUNTER — APPOINTMENT (OUTPATIENT)
Dept: MRI IMAGING | Facility: HOSPITAL | Age: 49
DRG: 093 | End: 2024-05-29
Payer: COMMERCIAL

## 2024-05-29 ENCOUNTER — APPOINTMENT (OUTPATIENT)
Dept: CT IMAGING | Facility: HOSPITAL | Age: 49
DRG: 093 | End: 2024-05-29
Payer: COMMERCIAL

## 2024-05-29 DIAGNOSIS — R41.82 ALTERED MENTAL STATUS, UNSPECIFIED ALTERED MENTAL STATUS TYPE: Primary | ICD-10-CM

## 2024-05-29 DIAGNOSIS — R27.0 ATAXIA: ICD-10-CM

## 2024-05-29 DIAGNOSIS — F10.11 HISTORY OF ALCOHOL ABUSE: ICD-10-CM

## 2024-05-29 DIAGNOSIS — R79.89 ABNORMAL LFTS: ICD-10-CM

## 2024-05-29 LAB
ALBUMIN SERPL-MCNC: 2.9 G/DL (ref 3.5–5.2)
ALBUMIN/GLOB SERPL: 0.9 G/DL
ALP SERPL-CCNC: 479 U/L (ref 39–117)
ALT SERPL W P-5'-P-CCNC: 66 U/L (ref 1–41)
AMMONIA BLD-SCNC: 13 UMOL/L (ref 16–60)
AMPHET+METHAMPHET UR QL: NEGATIVE
AMPHETAMINES UR QL: NEGATIVE
ANION GAP SERPL CALCULATED.3IONS-SCNC: 10 MMOL/L (ref 5–15)
AST SERPL-CCNC: 77 U/L (ref 1–40)
BACTERIA UR QL AUTO: ABNORMAL /HPF
BARBITURATES UR QL SCN: NEGATIVE
BASOPHILS # BLD AUTO: 0.03 10*3/MM3 (ref 0–0.2)
BASOPHILS NFR BLD AUTO: 0.3 % (ref 0–1.5)
BENZODIAZ UR QL SCN: POSITIVE
BILIRUB SERPL-MCNC: 4 MG/DL (ref 0–1.2)
BILIRUB UR QL STRIP: ABNORMAL
BUN SERPL-MCNC: 11 MG/DL (ref 6–20)
BUN/CREAT SERPL: 10.1 (ref 7–25)
BUPRENORPHINE SERPL-MCNC: NEGATIVE NG/ML
CALCIUM SPEC-SCNC: 8.2 MG/DL (ref 8.6–10.5)
CANNABINOIDS SERPL QL: NEGATIVE
CHLORIDE SERPL-SCNC: 97 MMOL/L (ref 98–107)
CLARITY UR: ABNORMAL
CO2 SERPL-SCNC: 29 MMOL/L (ref 22–29)
COCAINE UR QL: NEGATIVE
COLOR UR: ABNORMAL
CREAT SERPL-MCNC: 1.09 MG/DL (ref 0.76–1.27)
DEPRECATED RDW RBC AUTO: 54.7 FL (ref 37–54)
EGFRCR SERPLBLD CKD-EPI 2021: 83.2 ML/MIN/1.73
EOSINOPHIL # BLD AUTO: 0.05 10*3/MM3 (ref 0–0.4)
EOSINOPHIL NFR BLD AUTO: 0.6 % (ref 0.3–6.2)
ERYTHROCYTE [DISTWIDTH] IN BLOOD BY AUTOMATED COUNT: 19.3 % (ref 12.3–15.4)
ETHANOL BLD-MCNC: <10 MG/DL (ref 0–10)
FENTANYL UR-MCNC: NEGATIVE NG/ML
FOLATE SERPL-MCNC: 10.4 NG/ML (ref 4.78–24.2)
GLOBULIN UR ELPH-MCNC: 3.4 GM/DL
GLUCOSE BLDC GLUCOMTR-MCNC: 195 MG/DL (ref 70–130)
GLUCOSE BLDC GLUCOMTR-MCNC: 220 MG/DL (ref 70–130)
GLUCOSE SERPL-MCNC: 237 MG/DL (ref 65–99)
GLUCOSE UR STRIP-MCNC: ABNORMAL MG/DL
HCT VFR BLD AUTO: 33 % (ref 37.5–51)
HGB BLD-MCNC: 10.8 G/DL (ref 13–17.7)
HGB UR QL STRIP.AUTO: NEGATIVE
HOLD SPECIMEN: NORMAL
HYALINE CASTS UR QL AUTO: ABNORMAL /LPF
IMM GRANULOCYTES # BLD AUTO: 0.18 10*3/MM3 (ref 0–0.05)
IMM GRANULOCYTES NFR BLD AUTO: 2.1 % (ref 0–0.5)
INR PPP: 1.11 (ref 0.89–1.12)
KETONES UR QL STRIP: ABNORMAL
LEUKOCYTE ESTERASE UR QL STRIP.AUTO: ABNORMAL
LIPASE SERPL-CCNC: 46 U/L (ref 13–60)
LYMPHOCYTES # BLD AUTO: 1.4 10*3/MM3 (ref 0.7–3.1)
LYMPHOCYTES NFR BLD AUTO: 16.3 % (ref 19.6–45.3)
MAGNESIUM SERPL-MCNC: 2.2 MG/DL (ref 1.6–2.6)
MCH RBC QN AUTO: 27.5 PG (ref 26.6–33)
MCHC RBC AUTO-ENTMCNC: 32.7 G/DL (ref 31.5–35.7)
MCV RBC AUTO: 84 FL (ref 79–97)
METHADONE UR QL SCN: NEGATIVE
MONOCYTES # BLD AUTO: 0.82 10*3/MM3 (ref 0.1–0.9)
MONOCYTES NFR BLD AUTO: 9.6 % (ref 5–12)
NEUTROPHILS NFR BLD AUTO: 6.1 10*3/MM3 (ref 1.7–7)
NEUTROPHILS NFR BLD AUTO: 71.1 % (ref 42.7–76)
NITRITE UR QL STRIP: POSITIVE
NRBC BLD AUTO-RTO: 1.9 /100 WBC (ref 0–0.2)
OPIATES UR QL: NEGATIVE
OXYCODONE UR QL SCN: NEGATIVE
PCP UR QL SCN: NEGATIVE
PH UR STRIP.AUTO: 6 [PH] (ref 5–8)
PLATELET # BLD AUTO: 322 10*3/MM3 (ref 140–450)
PMV BLD AUTO: 10.7 FL (ref 6–12)
POTASSIUM SERPL-SCNC: 3.7 MMOL/L (ref 3.5–5.2)
PROT SERPL-MCNC: 6.3 G/DL (ref 6–8.5)
PROT UR QL STRIP: ABNORMAL
PROTHROMBIN TIME: 14.5 SECONDS (ref 12.2–14.5)
RBC # BLD AUTO: 3.93 10*6/MM3 (ref 4.14–5.8)
RBC # UR STRIP: ABNORMAL /HPF
REF LAB TEST METHOD: ABNORMAL
SODIUM SERPL-SCNC: 136 MMOL/L (ref 136–145)
SP GR UR STRIP: 1.02 (ref 1–1.03)
SQUAMOUS #/AREA URNS HPF: ABNORMAL /HPF
T4 FREE SERPL-MCNC: 1.1 NG/DL (ref 0.92–1.68)
TRICYCLICS UR QL SCN: NEGATIVE
TSH SERPL DL<=0.05 MIU/L-ACNC: 2 UIU/ML (ref 0.27–4.2)
UROBILINOGEN UR QL STRIP: ABNORMAL
VIT B12 BLD-MCNC: 1707 PG/ML (ref 211–946)
WBC # UR STRIP: ABNORMAL /HPF
WBC NRBC COR # BLD AUTO: 8.58 10*3/MM3 (ref 3.4–10.8)
WHOLE BLOOD HOLD COAG: NORMAL
WHOLE BLOOD HOLD SPECIMEN: NORMAL

## 2024-05-29 PROCEDURE — 25810000003 SODIUM CHLORIDE 0.9 % SOLUTION: Performed by: EMERGENCY MEDICINE

## 2024-05-29 PROCEDURE — 81001 URINALYSIS AUTO W/SCOPE: CPT | Performed by: EMERGENCY MEDICINE

## 2024-05-29 PROCEDURE — 82077 ASSAY SPEC XCP UR&BREATH IA: CPT | Performed by: EMERGENCY MEDICINE

## 2024-05-29 PROCEDURE — 84439 ASSAY OF FREE THYROXINE: CPT | Performed by: EMERGENCY MEDICINE

## 2024-05-29 PROCEDURE — 25010000002 THIAMINE PER 100 MG: Performed by: INTERNAL MEDICINE

## 2024-05-29 PROCEDURE — 99222 1ST HOSP IP/OBS MODERATE 55: CPT | Performed by: INTERNAL MEDICINE

## 2024-05-29 PROCEDURE — 96376 TX/PRO/DX INJ SAME DRUG ADON: CPT

## 2024-05-29 PROCEDURE — 84443 ASSAY THYROID STIM HORMONE: CPT | Performed by: EMERGENCY MEDICINE

## 2024-05-29 PROCEDURE — 70450 CT HEAD/BRAIN W/O DYE: CPT

## 2024-05-29 PROCEDURE — A9577 INJ MULTIHANCE: HCPCS | Performed by: INTERNAL MEDICINE

## 2024-05-29 PROCEDURE — 80053 COMPREHEN METABOLIC PANEL: CPT | Performed by: EMERGENCY MEDICINE

## 2024-05-29 PROCEDURE — 25010000002 ONDANSETRON PER 1 MG: Performed by: EMERGENCY MEDICINE

## 2024-05-29 PROCEDURE — 25010000002 THIAMINE HCL 200 MG/2ML SOLUTION: Performed by: EMERGENCY MEDICINE

## 2024-05-29 PROCEDURE — 96375 TX/PRO/DX INJ NEW DRUG ADDON: CPT

## 2024-05-29 PROCEDURE — 83735 ASSAY OF MAGNESIUM: CPT | Performed by: EMERGENCY MEDICINE

## 2024-05-29 PROCEDURE — 85610 PROTHROMBIN TIME: CPT | Performed by: EMERGENCY MEDICINE

## 2024-05-29 PROCEDURE — 82746 ASSAY OF FOLIC ACID SERUM: CPT | Performed by: INTERNAL MEDICINE

## 2024-05-29 PROCEDURE — 99285 EMERGENCY DEPT VISIT HI MDM: CPT

## 2024-05-29 PROCEDURE — 63710000001 INSULIN LISPRO (HUMAN) PER 5 UNITS: Performed by: INTERNAL MEDICINE

## 2024-05-29 PROCEDURE — 25010000002 ONDANSETRON PER 1 MG: Performed by: INTERNAL MEDICINE

## 2024-05-29 PROCEDURE — 83690 ASSAY OF LIPASE: CPT | Performed by: EMERGENCY MEDICINE

## 2024-05-29 PROCEDURE — 82607 VITAMIN B-12: CPT | Performed by: INTERNAL MEDICINE

## 2024-05-29 PROCEDURE — 85025 COMPLETE CBC W/AUTO DIFF WBC: CPT | Performed by: EMERGENCY MEDICINE

## 2024-05-29 PROCEDURE — 0 GADOBENATE DIMEGLUMINE 529 MG/ML SOLUTION: Performed by: INTERNAL MEDICINE

## 2024-05-29 PROCEDURE — 80307 DRUG TEST PRSMV CHEM ANLYZR: CPT | Performed by: EMERGENCY MEDICINE

## 2024-05-29 PROCEDURE — 70553 MRI BRAIN STEM W/O & W/DYE: CPT

## 2024-05-29 PROCEDURE — 82140 ASSAY OF AMMONIA: CPT | Performed by: EMERGENCY MEDICINE

## 2024-05-29 PROCEDURE — 82948 REAGENT STRIP/BLOOD GLUCOSE: CPT

## 2024-05-29 PROCEDURE — 96365 THER/PROPH/DIAG IV INF INIT: CPT

## 2024-05-29 RX ORDER — SODIUM CHLORIDE 0.9 % (FLUSH) 0.9 %
10 SYRINGE (ML) INJECTION EVERY 12 HOURS SCHEDULED
Status: DISCONTINUED | OUTPATIENT
Start: 2024-05-29 | End: 2024-05-31 | Stop reason: HOSPADM

## 2024-05-29 RX ORDER — SODIUM CHLORIDE 9 MG/ML
10 INJECTION, SOLUTION INTRAMUSCULAR; INTRAVENOUS; SUBCUTANEOUS AS NEEDED
Status: DISCONTINUED | OUTPATIENT
Start: 2024-05-29 | End: 2024-05-31 | Stop reason: HOSPADM

## 2024-05-29 RX ORDER — DEXTROSE MONOHYDRATE 25 G/50ML
25 INJECTION, SOLUTION INTRAVENOUS
Status: DISCONTINUED | OUTPATIENT
Start: 2024-05-29 | End: 2024-05-31 | Stop reason: HOSPADM

## 2024-05-29 RX ORDER — BISACODYL 10 MG
10 SUPPOSITORY, RECTAL RECTAL DAILY PRN
Status: DISCONTINUED | OUTPATIENT
Start: 2024-05-29 | End: 2024-05-31 | Stop reason: HOSPADM

## 2024-05-29 RX ORDER — ONDANSETRON 2 MG/ML
4 INJECTION INTRAMUSCULAR; INTRAVENOUS ONCE
Status: COMPLETED | OUTPATIENT
Start: 2024-05-29 | End: 2024-05-29

## 2024-05-29 RX ORDER — INSULIN LISPRO 100 [IU]/ML
2-7 INJECTION, SOLUTION INTRAVENOUS; SUBCUTANEOUS
Status: DISCONTINUED | OUTPATIENT
Start: 2024-05-29 | End: 2024-05-30

## 2024-05-29 RX ORDER — AMLODIPINE BESYLATE 10 MG/1
10 TABLET ORAL
Status: DISCONTINUED | OUTPATIENT
Start: 2024-05-30 | End: 2024-05-31 | Stop reason: HOSPADM

## 2024-05-29 RX ORDER — BUSPIRONE HYDROCHLORIDE 10 MG/1
15 TABLET ORAL 2 TIMES DAILY
Status: DISCONTINUED | OUTPATIENT
Start: 2024-05-29 | End: 2024-05-31 | Stop reason: HOSPADM

## 2024-05-29 RX ORDER — SODIUM CHLORIDE 0.9 % (FLUSH) 0.9 %
10 SYRINGE (ML) INJECTION AS NEEDED
Status: DISCONTINUED | OUTPATIENT
Start: 2024-05-29 | End: 2024-05-31 | Stop reason: HOSPADM

## 2024-05-29 RX ORDER — NICOTINE POLACRILEX 4 MG
15 LOZENGE BUCCAL
Status: DISCONTINUED | OUTPATIENT
Start: 2024-05-29 | End: 2024-05-31 | Stop reason: HOSPADM

## 2024-05-29 RX ORDER — THIAMINE HYDROCHLORIDE 100 MG/ML
200 INJECTION, SOLUTION INTRAMUSCULAR; INTRAVENOUS ONCE
Qty: 2 ML | Refills: 0 | Status: COMPLETED | OUTPATIENT
Start: 2024-05-29 | End: 2024-05-29

## 2024-05-29 RX ORDER — SODIUM CHLORIDE 9 MG/ML
1000 INJECTION, SOLUTION INTRAVENOUS ONCE
Status: COMPLETED | OUTPATIENT
Start: 2024-05-29 | End: 2024-05-29

## 2024-05-29 RX ORDER — IBUPROFEN 600 MG/1
1 TABLET ORAL
Status: DISCONTINUED | OUTPATIENT
Start: 2024-05-29 | End: 2024-05-31 | Stop reason: HOSPADM

## 2024-05-29 RX ORDER — AMOXICILLIN 250 MG
2 CAPSULE ORAL 2 TIMES DAILY PRN
Status: DISCONTINUED | OUTPATIENT
Start: 2024-05-29 | End: 2024-05-31 | Stop reason: HOSPADM

## 2024-05-29 RX ORDER — SODIUM CHLORIDE 9 MG/ML
40 INJECTION, SOLUTION INTRAVENOUS AS NEEDED
Status: DISCONTINUED | OUTPATIENT
Start: 2024-05-29 | End: 2024-05-31 | Stop reason: HOSPADM

## 2024-05-29 RX ORDER — BISACODYL 5 MG/1
5 TABLET, DELAYED RELEASE ORAL DAILY PRN
Status: DISCONTINUED | OUTPATIENT
Start: 2024-05-29 | End: 2024-05-31 | Stop reason: HOSPADM

## 2024-05-29 RX ORDER — PANTOPRAZOLE SODIUM 40 MG/1
40 TABLET, DELAYED RELEASE ORAL
Status: DISCONTINUED | OUTPATIENT
Start: 2024-05-30 | End: 2024-05-31 | Stop reason: HOSPADM

## 2024-05-29 RX ORDER — ONDANSETRON 2 MG/ML
4 INJECTION INTRAMUSCULAR; INTRAVENOUS EVERY 6 HOURS PRN
Status: DISCONTINUED | OUTPATIENT
Start: 2024-05-29 | End: 2024-05-31 | Stop reason: HOSPADM

## 2024-05-29 RX ORDER — VALSARTAN 160 MG/1
160 TABLET ORAL
Status: DISCONTINUED | OUTPATIENT
Start: 2024-05-30 | End: 2024-05-31 | Stop reason: HOSPADM

## 2024-05-29 RX ORDER — BUPROPION HYDROCHLORIDE 150 MG/1
300 TABLET ORAL DAILY
Status: DISCONTINUED | OUTPATIENT
Start: 2024-05-30 | End: 2024-05-31 | Stop reason: HOSPADM

## 2024-05-29 RX ORDER — ROSUVASTATIN CALCIUM 10 MG/1
5 TABLET, COATED ORAL NIGHTLY
Status: DISCONTINUED | OUTPATIENT
Start: 2024-05-29 | End: 2024-05-31 | Stop reason: HOSPADM

## 2024-05-29 RX ORDER — METOPROLOL TARTRATE 50 MG/1
50 TABLET, FILM COATED ORAL 2 TIMES DAILY
Status: DISCONTINUED | OUTPATIENT
Start: 2024-05-29 | End: 2024-05-31 | Stop reason: HOSPADM

## 2024-05-29 RX ORDER — POLYETHYLENE GLYCOL 3350 17 G/17G
17 POWDER, FOR SOLUTION ORAL DAILY PRN
Status: DISCONTINUED | OUTPATIENT
Start: 2024-05-29 | End: 2024-05-31 | Stop reason: HOSPADM

## 2024-05-29 RX ORDER — OXAZEPAM 10 MG
10 CAPSULE ORAL NIGHTLY PRN
COMMUNITY
Start: 2024-05-28 | End: 2024-05-31 | Stop reason: HOSPADM

## 2024-05-29 RX ORDER — ACETAMINOPHEN 325 MG/1
650 TABLET ORAL EVERY 4 HOURS PRN
Status: DISCONTINUED | OUTPATIENT
Start: 2024-05-29 | End: 2024-05-31 | Stop reason: HOSPADM

## 2024-05-29 RX ADMIN — THIAMINE HYDROCHLORIDE 500 MG: 100 INJECTION, SOLUTION INTRAMUSCULAR; INTRAVENOUS at 21:47

## 2024-05-29 RX ADMIN — ACETAMINOPHEN 650 MG: 325 TABLET ORAL at 20:17

## 2024-05-29 RX ADMIN — ONDANSETRON 4 MG: 2 INJECTION INTRAMUSCULAR; INTRAVENOUS at 20:17

## 2024-05-29 RX ADMIN — THIAMINE HYDROCHLORIDE 200 MG: 100 INJECTION, SOLUTION INTRAMUSCULAR; INTRAVENOUS at 12:28

## 2024-05-29 RX ADMIN — ONDANSETRON 4 MG: 2 INJECTION INTRAMUSCULAR; INTRAVENOUS at 13:07

## 2024-05-29 RX ADMIN — INSULIN LISPRO 3 UNITS: 100 INJECTION, SOLUTION INTRAVENOUS; SUBCUTANEOUS at 21:47

## 2024-05-29 RX ADMIN — SODIUM CHLORIDE 1000 ML: 9 INJECTION, SOLUTION INTRAVENOUS at 12:28

## 2024-05-29 RX ADMIN — GADOBENATE DIMEGLUMINE 20 ML: 529 INJECTION, SOLUTION INTRAVENOUS at 20:57

## 2024-05-29 RX ADMIN — ROSUVASTATIN 5 MG: 10 TABLET, FILM COATED ORAL at 21:44

## 2024-05-29 RX ADMIN — METOPROLOL TARTRATE 50 MG: 50 TABLET, FILM COATED ORAL at 21:44

## 2024-05-29 RX ADMIN — FOLIC ACID 1 MG: 5 INJECTION, SOLUTION INTRAMUSCULAR; INTRAVENOUS; SUBCUTANEOUS at 13:07

## 2024-05-29 RX ADMIN — BUSPIRONE HYDROCHLORIDE 15 MG: 10 TABLET ORAL at 21:48

## 2024-05-29 RX ADMIN — Medication 10 ML: at 21:48

## 2024-05-29 NOTE — H&P
"    [facility] Medicine Services  HISTORY AND PHYSICAL    Patient Name: Terrence James  : 1975  MRN: [id number]  Primary Care Physician: Pino Borrero MD  Date of admission: 2024      Subjective   Subjective     Chief Complaint:  Ataxia, confusion, lethargy    HPI:  Terrence James is a 48 y.o. male with PMHx significant for alcohol abuse (last drink ), HTN, anxiety/depression, DMII with recent admission to [place] for alcoholic pancreatitis discharged  who presents to [facility] ED with increasing confusion, lethargy and ataxia w/some slurred speech. Patient states his last drink was about 10 days ago, he says he normally drinks about 1/2 of a 5th of vodka daily, has had several periods of sobriety mixed in with his years of drinking and would like to quit for good. On record review from [place] it does appear he had developed some hallucinations and AMS while there that was felt was possibly secondary to significant IV narcotic pain medications and tx for withdrawal. It does appear he was discharged home on oral serax taper as well as thiamine. His significant other also mentions that he has developed trouble walking w/ataxia and worsening somnolence, she is not present int he room at the time of my evaluation and he is only able to give me minimal history but states \"I just crashed when I got home\".      Personal History     Past Medical History:   Diagnosis Date    Anxiety     COVID-19 2021    DVT (deep venous thrombosis)     LEFT LEG     Hypertension     Wears reading eyeglasses            Past Surgical History:   Procedure Laterality Date    CARDIAC CATHETERIZATION  2019    CORONARY STENT PLACEMENT  2019    EAR TUBES Bilateral     FEMORAL THROMBECTOMY/EMBOLECTOMY Left 10/20/2021    Procedure: ULTRASOUND GUIDED ACCESS; LEFT LEG VENOGRAM; INTRAVENOUS ULTRASOUND OF FEMORAL VEIN, POPLITEAL VEIN, EXTERNAL AND COMMON ILIAC VEINS; LEFT POPLITEAL VEIN " ANGIOPLASTY; LEFT COMMON ILIAC VEIN ANGIOPLASTY; POPLITEAL AND FEMORAL VEIN THROMBECTOMY - LEFT;  Surgeon: Arnel Gordon MD;  Location: [facility];  Service: Vascular;  Laterality: Left;  FLUORO:3 MIN 48 SEC  DOSE: 45 MGY  CONTRAS    GASTRIC SLEEVE LAPAROSCOPIC  03/2014    LUMBAR DISCECTOMY  2006    L3-4    VASECTOMY         Family History: family history includes Diabetes in his mother; Heart attack in his father; Kidney disease in his mother; No Known Problems in his brother, daughter, sister, sister, and son; Other in his maternal grandfather, maternal grandmother, paternal grandfather, and paternal grandmother; Skin cancer in his mother; Vision loss in his mother.     Social History:  reports that he quit smoking about 3 years ago. His smoking use included cigarettes. He started smoking about 25 years ago. He has a 5.5 pack-year smoking history. He has never used smokeless tobacco. He reports current alcohol use. He reports that he does not use drugs.  Social History     Social History Narrative    Not on file       Medications:  Available home medication information reviewed.  amLODIPine-valsartan, buPROPion XL, busPIRone, hydrOXYzine, metFORMIN ER, metoprolol tartrate, omeprazole, oxazepam, rosuvastatin, sildenafil, and thiamine    No Known Allergies    Objective   Objective     Vital Signs:   Temp:  [99 °F (37.2 °C)] 99 °F (37.2 °C)  Heart Rate:  [65-77] 70  Resp:  [16] 16  BP: (131-156)/() 152/100  Flow (L/min):  [1] 1       Physical Exam   Constitutional: Awake, alert, but lethargic/somnolent at times  Eyes: PERRLA, sclerae anicteric, no conjunctival injection  HENT: NCAT, mucous membranes moist  Neck: Supple, no thyromegaly, no lymphadenopathy, trachea midline  Respiratory: Clear to auscultation bilaterally, nonlabored respirations   Cardiovascular: RRR, no murmurs, rubs, or gallops, palpable pedal pulses bilaterally  Gastrointestinal: Positive bowel sounds, soft, slightly tender to palpation  in epigastric area and rLQ, nondistended  Musculoskeletal: No bilateral ankle edema, no clubbing or cyanosis to extremities  Psychiatric: flat affect, cooperative  Neurologic: Oriented x 3, speech is somewhat slow and slurred, did not stand him and walk him in the ER  Skin: No rashes      Result Review:  I have personally reviewed the results from the time of this admission to 5/29/2024 14:44 EDT and agree with these findings:  [x]  Laboratory list / accordion  [x]  Microbiology  [x]  Radiology  [x]  EKG/Telemetry   [x]  Cardiology/Vascular   [x]  Pathology  [x]  Old records  []  Other:  Most notable findings include:       LAB RESULTS:      Lab 05/29/24  1139 05/25/24  1233   WBC 8.58  --    HEMOGLOBIN 10.8*  --    HEMATOCRIT 33.0*  --    PLATELETS 322  --    NEUTROS ABS 6.10  --    IMMATURE GRANS (ABS) 0.18*  --    LYMPHS ABS 1.40  --    MONOS ABS 0.82  --    EOS ABS 0.05  --    MCV 84.0  --    PROTIME 14.5 10.4   INR 1.11 0.94   APTT  --  26.6         Lab 05/29/24  1139 05/27/24  0444 05/25/24  0450   SODIUM 136  --   --    POTASSIUM 3.7  --   --    CHLORIDE 97*  --   --    CO2 29.0  --   --    ANION GAP 10.0  --   --    BUN 11  --   --    CREATININE 1.09  --   --    EGFR 83.2  --   --    GLUCOSE 237*  --   --    CALCIUM 8.2*  --   --    MAGNESIUM 2.2 2.3 1.7   TSH 2.000  --   --          Lab 05/29/24  1139 05/25/24  0450 05/23/24  0540   TOTAL PROTEIN 6.3  --   --    ALBUMIN 2.9*  --   --    GLOBULIN 3.4  --   --    ALT (SGPT) 66*  --   --    AST (SGOT) 77*  --   --    BILIRUBIN 4.0*  --   --    ALK PHOS 479*  --   --    LIPASE 46 43 215*                     UA          5/22/2024    00:56 5/29/2024    11:43   Urinalysis   Squamous Epithelial Cells, UA 3-6  0-2    Specific Gravity, UA 1.023  1.022    Ketones, UA Trace  15 mg/dL (1+)    Blood, UA Negative  Negative    Leukocytes, UA Small (1+)  Small (1+)    Nitrite, UA Negative  Positive    RBC, UA 0-2  3-5    WBC, UA 11-20  11-20    Bacteria, UA None Seen  None  Seen        Microbiology Results (last 10 days)       ** No results found for the last 240 hours. **            CT Head Without Contrast    Result Date: 5/29/2024  CT HEAD WO CONTRAST Date of Exam: 5/29/2024 12:10 PM EDT Indication: ams. Comparison: None available. Technique: Axial CT images were obtained of the head without contrast administration.  Automated exposure control and iterative construction methods were used. Findings: Gray-white differentiation is maintained and there is no evidence of intracranial hemorrhage, mass or mass effect. The ventricles are normal in size and configuration. The orbits are normal. The paranasal sinuses are clear. The calvarium is intact.     Impression: Impression: No acute intracranial abnormality. Electronically Signed: Doug Tavarez MD  5/29/2024 12:48 PM EDT  Workstation ID: [id number]         Assessment & Plan   Assessment & Plan       Ataxia    Anxiety and depression    Coronary artery disease involving native coronary artery of native heart without angina pectoris    Hypertension    Gastroesophageal reflux disease    Type 2 diabetes mellitus with hyperglycemia, without long-term current use of insulin    Uncomplicated alcohol dependence    Hyperlipidemia LDL goal <70    Terrence James is a 48 y.o. male with PMHx significant for alcohol abuse (last drink 5/19/24), HTN, anxiety/depression, DMII with recent admission to [place] for alcoholic pancreatitis and alcohol withdrawal discharged 5/28 who presents to [facility] ED with increasing confusion, lethargy and ataxia w/some slurred speech.    AMS/Encephalopathy w/Ataxia  Lethargy/somnolence  - etiology not completely clear, but first concern given his alcohol use  and gastric sleeve would be Wernicke/Korsakoff, will start high dose thiamine protocol 500mg IV TID x 2 days, then 250mg IV daily x 5 days  - check B12/folate levels, note that ammonia is WNL, his LFTs are trending down  - presentation also complicated by recent stay  at [place] where he received large doses of narcotic pain medications for pancreatitis as well as benzos to treat ETOH withdrawal, does appear he was discharged on oral serax which I will hold as it is likely making his sedation worse  - no clear infectious etiology at this time  - CT head negative, will get MRI to r/o any underlying stroke  - Neurology consult  - PT/OT to evaluate    Recent Alcoholic Pancreatitis  - treated at [place], discharge 5/28  - LFTs appear improved/downtrending, lipase is WNL  - abdominal exam is mostly benign, hold on repeating abdominal imaging at this time  - MRCP from 5/26 c/w pancreatitis with cystic lesion likely an IPMN, GI at [place] recommended repeat MRCP in 6-8 weeks outpatient    ETOH Abuse:  - reports drinking 1/2 of a 5th of vodka daily, has not had a drink since 5/19  - completed inpatient treatment for withdrawal at [facility] w/[facility], discharged on oral serax. Hold further benzos at this time  - chemical dependency consult, patient very motivated to quit    DMII:  - hold metformin, cover with SSI for now    HTN:  - continue home norvasc/losartan    HLD:  - continue statin    Anxiety/Depression:  - continue home buspar, wellbutrin    DVT prophylaxis:  Mechanical DVT prophylaxis orders are signed and held.      CODE STATUS:    Code Status and Medical Interventions:   Ordered at: 05/29/24 7023     Level Of Support Discussed With:    Patient     Code Status (Patient has no pulse and is not breathing):    CPR (Attempt to Resuscitate)     Medical Interventions (Patient has pulse or is breathing):    Full Support       Expected Discharge   Expected discharge date/ time has not been documented.     Osiris Gallagher,   05/29/24

## 2024-05-29 NOTE — OUTREACH NOTE
Call Center TCM Note      Flowsheet Row Responses   [facility] [place] patient discharged from? Non-  [[place]]   Does the patient have one of the following disease processes/diagnoses(primary or secondary)? Other   TCM attempt successful? No   Unsuccessful attempts Attempt 1  [Patient currently in ED at [facility]]            Puja Hernandez RN    5/29/2024, 12:18 EDT

## 2024-05-29 NOTE — Clinical Note
Level of Care: Telemetry [5]   Diagnosis: Ataxia [176023]   Admitting Physician: DARIUSZ TRUJILLO [[id number]]   Attending Physician: DARIUSZ TRUJILLO [[id number]]   Certification: I Certify That Inpatient Hospital Services Are Medically Necessary For Greater Than 2 Midnights

## 2024-05-29 NOTE — ED PROVIDER NOTES
[place]    EMERGENCY DEPARTMENT ENCOUNTER      Pt Name: Terrence James  MRN: [id number]  YOB: 1975  Date of evaluation: 5/29/2024  Provider: Michael Cardona MD    CHIEF COMPLAINT       Chief Complaint   Patient presents with    Pancreatitis         HISTORY OF PRESENT ILLNESS   Terrence James is a 48 y.o. male who presents to the emergency department with his wife for reported confusion, somnolence, and difficulty walking over the past several days.  He had recent admission to [facility] for alcohol induced pancreatitis/hepatitis.  His abdominal pain resolved and he was doing better and was ultimately discharged.  Reportedly has not had any alcohol in about 10 days.  Patient himself says that he feels very sleepy and intermittently confused but has no pain or other specific complaint.      Nursing notes were reviewed.    REVIEW OF SYSTEMS     ROS:  A chief complaint appropriate review of systems was completed and is negative except as noted in the HPI.      PAST MEDICAL HISTORY     Past Medical History:   Diagnosis Date    Anxiety     COVID-19 08/2021    DVT (deep venous thrombosis)     LEFT LEG     Hypertension     Wears reading eyeglasses          SURGICAL HISTORY       Past Surgical History:   Procedure Laterality Date    CARDIAC CATHETERIZATION  08/2019    CORONARY STENT PLACEMENT  08/2019    EAR TUBES Bilateral 2012    FEMORAL THROMBECTOMY/EMBOLECTOMY Left 10/20/2021    Procedure: ULTRASOUND GUIDED ACCESS; LEFT LEG VENOGRAM; INTRAVENOUS ULTRASOUND OF FEMORAL VEIN, POPLITEAL VEIN, EXTERNAL AND COMMON ILIAC VEINS; LEFT POPLITEAL VEIN ANGIOPLASTY; LEFT COMMON ILIAC VEIN ANGIOPLASTY; POPLITEAL AND FEMORAL VEIN THROMBECTOMY - LEFT;  Surgeon: Arnel Gordon MD;  Location: [facility];  Service: Vascular;  Laterality: Left;  FLUORO:3 MIN 48 SEC  DOSE: 45 MGY  CONTRAS    GASTRIC SLEEVE LAPAROSCOPIC  03/2014    LUMBAR DISCECTOMY  2006    L3-4    VASECTOMY           CURRENT MEDICATIONS        Current Facility-Administered Medications:     Sodium Chloride (PF) 0.9 % 10 mL, 10 mL, Intravenous, PRN, Michael Cardona MD    thiamine (B-1) 500 mg in sodium chloride 0.9 % 100 mL IVPB, 500 mg, Intravenous, Once, Michael Cardona MD    Current Outpatient Medications:     oxazepam (SERAX) 10 MG capsule, Take 1 capsule by mouth At Night As Needed for Withdrawal., Disp: , Rfl:     thiamine (VITAMIN B-1) 100 MG tablet  tablet, Take 1 tablet by mouth Daily., Disp: , Rfl:     amLODIPine-valsartan (EXFORGE)  MG per tablet, TAKE 1 TABLET BY MOUTH DAILY, Disp: 30 tablet, Rfl: 2    buPROPion XL (WELLBUTRIN XL) 300 MG 24 hr tablet, Take 1 tablet by mouth Daily., Disp: 30 tablet, Rfl: 5    busPIRone (BUSPAR) 15 MG tablet, Take 1 tablet by mouth 2 (Two) Times a Day., Disp: 60 tablet, Rfl: 2    hydrOXYzine (ATARAX) 25 MG tablet, , Disp: , Rfl:     metFORMIN ER (GLUCOPHAGE-XR) 500 MG 24 hr tablet, Take 2 tablets by mouth Daily With Breakfast., Disp: 60 tablet, Rfl: 2    metoprolol tartrate (LOPRESSOR) 50 MG tablet, Take 1 tablet by mouth 2 (Two) Times a Day., Disp: 180 tablet, Rfl: 3    omeprazole (priLOSEC) 40 MG capsule, TAKE 1 CAPSULE BY MOUTH DAILY, Disp: 30 capsule, Rfl: 2    rosuvastatin (CRESTOR) 5 MG tablet, TAKE 1 TABLET BY MOUTH DAILY, Disp: 90 tablet, Rfl: 0    sildenafil (Viagra) 25 MG tablet, Take 1-4 tablets daily prn for ED 1-2 hours before sexual activity., Disp: 30 tablet, Rfl: 6    ALLERGIES     Patient has no known allergies.    FAMILY HISTORY       Family History   Problem Relation Age of Onset    Vision loss Mother     Diabetes Mother     Kidney disease Mother     Skin cancer Mother         squamous cell    Heart attack Father     No Known Problems Sister     No Known Problems Sister     No Known Problems Brother     Other Maternal Grandmother         unknown    Other Maternal Grandfather         unknown    Other Paternal Grandmother         unknown    Other Paternal Grandfather          unknown    No Known Problems Daughter     No Known Problems Son           SOCIAL HISTORY       Social History     Socioeconomic History    Marital status: Single   Tobacco Use    Smoking status: Former     Current packs/day: 0.00     Average packs/day: 0.3 packs/day for 22.0 years (5.5 ttl pk-yrs)     Types: Cigarettes     Start date: 1999     Quit date: 2021     Years since quitting: 3.4    Smokeless tobacco: Never   Vaping Use    Vaping status: Never Used   Substance and Sexual Activity    Alcohol use: Yes     Comment: ~2-3 DRINK WEEKLY     Drug use: Never    Sexual activity: Yes     Partners: Female     Comment: 5-6 female partners in the last year.         PHYSICAL EXAM    (up to 7 for level 4, 8 or more for level 5)     Vitals:    05/29/24 1300 05/29/24 1315 05/29/24 1330 05/29/24 1400   BP: 142/100  156/99 152/100   Pulse: 65 67 68 70   Resp:       Temp:       TempSrc:       SpO2: 94% (!) 89% 94% 98%   Weight:       Height:           General: Awake, alert, no acute distress.  HEENT: Jaundiced  Neck: Trachea midline.  Cardiac: Heart regular rate, rhythm, no murmurs, rubs, or gallops  Lungs: Lungs are clear to auscultation, there is no wheezing, rhonchi, or rales. There is no use of accessory muscles.  Chest wall: There is no tenderness to palpation over the chest wall or over ribs  Abdomen: Abdomen is soft, nontender, nondistended. There are no firm or pulsatile masses, no rebound rigidity or guarding.   Musculoskeletal: No deformity.  Neuro: Alert and oriented x 4.  Moves all extremities equally.  Gait is slightly ataxic.  Dermatology: Skin is warm and dry  Psych: Mentation is grossly normal, cognition is grossly normal. Affect is appropriate.        DIAGNOSTIC RESULTS     EKG: All EKGs are interpreted by the Emergency Department Physician who either signs or Co-signs this chart in the absence of a cardiologist.    No orders to display         RADIOLOGY:   [x] Radiologist's Report Reviewed:  CT Head Without  Contrast   Final Result   Impression:   No acute intracranial abnormality.            Electronically Signed: Doug Tavarez MD     5/29/2024 12:48 PM EDT     Workstation ID: [id number]          I ordered and independently reviewed the above noted radiographic studies.        LABS:    I have reviewed and interpreted all of the currently available lab results from this visit (if applicable):  Results for orders placed or performed during the hospital encounter of 05/29/24   Comprehensive Metabolic Panel    Specimen: Blood   Result Value Ref Range    Glucose 237 (H) 65 - 99 mg/dL    BUN 11 6 - 20 mg/dL    Creatinine 1.09 0.76 - 1.27 mg/dL    Sodium 136 136 - 145 mmol/L    Potassium 3.7 3.5 - 5.2 mmol/L    Chloride 97 (L) 98 - 107 mmol/L    CO2 29.0 22.0 - 29.0 mmol/L    Calcium 8.2 (L) 8.6 - 10.5 mg/dL    Total Protein 6.3 6.0 - 8.5 g/dL    Albumin 2.9 (L) 3.5 - 5.2 g/dL    ALT (SGPT) 66 (H) 1 - 41 U/L    AST (SGOT) 77 (H) 1 - 40 U/L    Alkaline Phosphatase 479 (H) 39 - 117 U/L    Total Bilirubin 4.0 (H) 0.0 - 1.2 mg/dL    Globulin 3.4 gm/dL    A/G Ratio 0.9 g/dL    BUN/Creatinine Ratio 10.1 7.0 - 25.0    Anion Gap 10.0 5.0 - 15.0 mmol/L    eGFR 83.2 >60.0 mL/min/1.73   Lipase    Specimen: Blood   Result Value Ref Range    Lipase 46 13 - 60 U/L   Urinalysis With Microscopic If Indicated (No Culture) - Urine, Clean Catch    Specimen: Urine, Clean Catch   Result Value Ref Range    Color, UA Dark Yellow (A) Yellow, Straw    Appearance, UA Cloudy (A) Clear    pH, UA 6.0 5.0 - 8.0    Specific Gravity, UA 1.022 1.001 - 1.030    Glucose,  mg/dL (1+) (A) Negative    Ketones, UA 15 mg/dL (1+) (A) Negative    Bilirubin, UA Large (3+) (A) Negative    Blood, UA Negative Negative    Protein, UA 30 mg/dL (1+) (A) Negative    Leuk Esterase, UA Small (1+) (A) Negative    Nitrite, UA Positive (A) Negative    Urobilinogen, UA 1.0 E.U./dL 0.2 - 1.0 E.U./dL   CBC Auto Differential    Specimen: Blood   Result Value Ref Range    WBC  8.58 3.40 - 10.80 10*3/mm3    RBC 3.93 (L) 4.14 - 5.80 10*6/mm3    Hemoglobin 10.8 (L) 13.0 - 17.7 g/dL    Hematocrit 33.0 (L) 37.5 - 51.0 %    MCV 84.0 79.0 - 97.0 fL    MCH 27.5 26.6 - 33.0 pg    MCHC 32.7 31.5 - 35.7 g/dL    RDW 19.3 (H) 12.3 - 15.4 %    RDW-SD 54.7 (H) 37.0 - 54.0 fl    MPV 10.7 6.0 - 12.0 fL    Platelets 322 140 - 450 10*3/mm3    Neutrophil % 71.1 42.7 - 76.0 %    Lymphocyte % 16.3 (L) 19.6 - 45.3 %    Monocyte % 9.6 5.0 - 12.0 %    Eosinophil % 0.6 0.3 - 6.2 %    Basophil % 0.3 0.0 - 1.5 %    Immature Grans % 2.1 (H) 0.0 - 0.5 %    Neutrophils, Absolute 6.10 1.70 - 7.00 10*3/mm3    Lymphocytes, Absolute 1.40 0.70 - 3.10 10*3/mm3    Monocytes, Absolute 0.82 0.10 - 0.90 10*3/mm3    Eosinophils, Absolute 0.05 0.00 - 0.40 10*3/mm3    Basophils, Absolute 0.03 0.00 - 0.20 10*3/mm3    Immature Grans, Absolute 0.18 (H) 0.00 - 0.05 10*3/mm3    nRBC 1.9 (H) 0.0 - 0.2 /100 WBC   Urine Drug Screen - Urine, Clean Catch    Specimen: Urine, Clean Catch   Result Value Ref Range    THC, Screen, Urine Negative Negative    Phencyclidine (PCP), Urine Negative Negative    Cocaine Screen, Urine Negative Negative    Methamphetamine, Ur Negative Negative    Opiate Screen Negative Negative    Amphetamine Screen, Urine Negative Negative    Benzodiazepine Screen, Urine Positive (A) Negative    Tricyclic Antidepressants Screen Negative Negative    Methadone Screen, Urine Negative Negative    Barbiturates Screen, Urine Negative Negative    Oxycodone Screen, Urine Negative Negative    Buprenorphine, Screen, Urine Negative Negative   Ethanol    Specimen: Blood   Result Value Ref Range    Ethanol <10 0 - 10 mg/dL   TSH    Specimen: Blood   Result Value Ref Range    TSH 2.000 0.270 - 4.200 uIU/mL   T4, Free    Specimen: Blood   Result Value Ref Range    Free T4 1.10 0.92 - 1.68 ng/dL   Protime-INR    Specimen: Blood   Result Value Ref Range    Protime 14.5 12.2 - 14.5 Seconds    INR 1.11 0.89 - 1.12   Ammonia    Specimen:  Blood   Result Value Ref Range    Ammonia 13 (L) 16 - 60 umol/L   Magnesium    Specimen: Blood   Result Value Ref Range    Magnesium 2.2 1.6 - 2.6 mg/dL   Fentanyl, Urine - Urine, Clean Catch    Specimen: Urine, Clean Catch   Result Value Ref Range    Fentanyl, Urine Negative Negative   Urinalysis, Microscopic Only - Urine, Clean Catch    Specimen: Urine, Clean Catch   Result Value Ref Range    RBC, UA 3-5 (A) None Seen, 0-2 /HPF    WBC, UA 11-20 (A) None Seen, 0-2 /HPF    Bacteria, UA None Seen None Seen, Trace /HPF    Squamous Epithelial Cells, UA 0-2 None Seen, 0-2 /HPF    Hyaline Casts, UA 13-20 0 - 6 /LPF    Methodology Automated Microscopy    Green Top (Gel)   Result Value Ref Range    Extra Tube Hold for add-ons.    Lavender Top   Result Value Ref Range    Extra Tube hold for add-on    Gold Top - SST   Result Value Ref Range    Extra Tube Hold for add-ons.    Gray Top   Result Value Ref Range    Extra Tube Hold for add-ons.    Light Blue Top   Result Value Ref Range    Extra Tube Hold for add-ons.         If labs were ordered, I independently reviewed the results and considered them in treating the patient.      EMERGENCY DEPARTMENT COURSE and DIFFERENTIAL DIAGNOSIS/MDM:   Vitals:  AS OF 14:15 EDT    BP - 152/100  HR - 70  TEMP - 99 °F (37.2 °C) (Oral)  O2 SATS - 98%        Discussion below represents my analysis of pertinent findings related to patient's condition, differential diagnosis, treatment plan and final disposition.      Differential diagnosis:  The differential diagnosis associated with the patient's presentation includes: Hyperammonemia, intracranial hemorrhage, CVA, electrolyte derangement, urinary tract infection, pneumonia, substance abuse, Warnicke Korsakoff syndrome      Independent interpretations (ECG/rhythm strip/X-ray/US/CT scan): I independently interpreted the patient's head CT and cardiac monitor.  There is no intracranial hemorrhage and the patient is in sinus rhythm      Additional  sources:  Discussed/obtained information from independent historians:   [x] Spouse: Reports taken from the patient's spouse and is as noted in the HPI   [] Parent:   [] Friend:   [] EMS:   [] Other:  External (non-ED) record review:   [] Inpatient record:   [] Office record:   [] Outpatient record:   [] Prior Outpatient labs:   [] Prior Outpatient radiology:   [] Primary Care record:   [] Outside ED record:   [x] Other: I reviewed MRCP obtained earlier this month.  No evidence of biliary obstruction.      Patient's care impacted by:   [] Diabetes   [] Hypertension   [] Coronary Artery Disease   [] Cancer   [x] Other: History of alcohol abuse    Care significantly affected by Social Determinants of Health (housing and economic circumstances, unemployment)    [] Yes     [x] No   If yes, Patient's care significantly limited by  Social Determinants of Health including:    [] Inadequate housing    [] Low income    [] Alcoholism and drug addiction in family    [] Problems related to primary support group    [] Unemployment    [] Problems related to employment    [] Other Social Determinants of Health:       Consideration of admission/observation vs discharge: Patient presents with ongoing encephalopathy, somnolence, difficulty walking and warrants admission for further evaluation      ED Course:    ED Course as of 05/29/24 1415   Wed May 29, 2024   1415 Patient presents with encephalopathy/somnolence/ataxia following admission recently to [facility] for alcoholic pancreatitis/hepatitis. Abdominal pain resolved and he was ultimately discharged. Says that he has not had any alcohol in 10 days. His wife brought him in today due to worsening episodes of confusion, somnolence, and gait ataxia. He has had significant difficulty walking over the last couple of days. On my exam, patient is slightly somnolent and mildly confused. Gait is slightly ataxic. Workup here today looks okay. His LFTs look like they are improving from his  [facility] admission and ammonia is normal. Head CT is also normal. Have ordered thiamine. [NS]   1415 I discussed case with hospitalist Dr. Jung.  Discussed patient history, presentation, workup.  Accepts patient for admission. [NS]      ED Course User Index  [NS] Michael Cardona MD         PROCEDURES:  Procedures    CRITICAL CARE TIME        FINAL IMPRESSION      1. Altered mental status, unspecified altered mental status type    2. Ataxia    3. History of alcohol abuse    4. Abnormal LFTs          DISPOSITION/PLAN     ED Disposition       ED Disposition   Decision to Admit    Condition   --    Comment   --                 Comment: Please note this report has been produced using speech recognition software.      Michael Cardona MD  Attending Emergency Physician             Michael Cardona MD  05/29/24 8298

## 2024-05-29 NOTE — OUTREACH NOTE
Call Center TCM Note      Flowsheet Row Responses   [facility] patient discharged from? Non-  [[facility]]   Does the patient have one of the following disease processes/diagnoses(primary or secondary)? Other   TCM attempt successful? No   Unsuccessful attempts Attempt 2   Change in Health Status Readmitted  [Patient has been admitted to  [facility]]            Puja Hernandez RN    5/29/2024, 14:36 EDT

## 2024-05-29 NOTE — ED NOTES
Terrence James    Nursing Report ED to Floor:  Mental status: A+Ox4  Ambulatory status: Stand by  Oxygen Therapy:  1L NC  Cardiac Rhythm: Normal sinus  Admitted from: Home  Safety Concerns:  Seizure precautions, fall risk - gait unsteady  Social Issues: Alcohol withdrawl  ED Room #:  12    ED Nurse Phone Extension - [phone] or may call [phone].      HPI:   Chief Complaint   Patient presents with    Pancreatitis       Past Medical History:  Past Medical History:   Diagnosis Date    Anxiety     COVID-19 08/2021    DVT (deep venous thrombosis)     LEFT LEG     Hypertension     Wears reading eyeglasses         Past Surgical History:  Past Surgical History:   Procedure Laterality Date    CARDIAC CATHETERIZATION  08/2019    CORONARY STENT PLACEMENT  08/2019    EAR TUBES Bilateral 2012    FEMORAL THROMBECTOMY/EMBOLECTOMY Left 10/20/2021    Procedure: ULTRASOUND GUIDED ACCESS; LEFT LEG VENOGRAM; INTRAVENOUS ULTRASOUND OF FEMORAL VEIN, POPLITEAL VEIN, EXTERNAL AND COMMON ILIAC VEINS; LEFT POPLITEAL VEIN ANGIOPLASTY; LEFT COMMON ILIAC VEIN ANGIOPLASTY; POPLITEAL AND FEMORAL VEIN THROMBECTOMY - LEFT;  Surgeon: Arnel Gordon MD;  Location: [facility];  Service: Vascular;  Laterality: Left;  FLUORO:3 MIN 48 SEC  DOSE: 45 MGY  CONTRAS    GASTRIC SLEEVE LAPAROSCOPIC  03/2014    LUMBAR DISCECTOMY  2006    L3-4    VASECTOMY          Admitting Doctor:   Osiris Gallagher DO    Consulting Provider(s):  Consults       No orders found from 4/30/2024 to 5/30/2024.             Admitting Diagnosis:   The primary encounter diagnosis was Altered mental status, unspecified altered mental status type. Diagnoses of Ataxia, History of alcohol abuse, and Abnormal LFTs were also pertinent to this visit.    Most Recent Vitals:   Vitals:    05/29/24 1300 05/29/24 1315 05/29/24 1330 05/29/24 1400   BP: 142/100  156/99 152/100   Pulse: 65 67 68 70   Resp:       Temp:       TempSrc:       SpO2: 94% (!) 89% 94% 98%   Weight:       Height:            Active LDAs/IV Access:   Lines, Drains & Airways       Active LDAs       Name Placement date Placement time Site Days    Peripheral IV 05/29/24 1140 Right Antecubital 05/29/24  1140  Antecubital  less than 1    Peripheral IV 05/29/24 1258 Right Antecubital 05/29/24  1258  Antecubital  less than 1                    Labs (abnormal labs have a star):   Labs Reviewed   COMPREHENSIVE METABOLIC PANEL - Abnormal; Notable for the following components:       Result Value    Glucose 237 (*)     Chloride 97 (*)     Calcium 8.2 (*)     Albumin 2.9 (*)     ALT (SGPT) 66 (*)     AST (SGOT) 77 (*)     Alkaline Phosphatase 479 (*)     Total Bilirubin 4.0 (*)     All other components within normal limits    Narrative:     GFR Normal >60  Chronic Kidney Disease <60  Kidney Failure <15     URINALYSIS W/ MICROSCOPIC IF INDICATED (NO CULTURE) - Abnormal; Notable for the following components:    Color, UA Dark Yellow (*)     Appearance, UA Cloudy (*)     Glucose,  mg/dL (1+) (*)     Ketones, UA 15 mg/dL (1+) (*)     Bilirubin, UA Large (3+) (*)     Protein, UA 30 mg/dL (1+) (*)     Leuk Esterase, UA Small (1+) (*)     Nitrite, UA Positive (*)     All other components within normal limits   CBC WITH AUTO DIFFERENTIAL - Abnormal; Notable for the following components:    RBC 3.93 (*)     Hemoglobin 10.8 (*)     Hematocrit 33.0 (*)     RDW 19.3 (*)     RDW-SD 54.7 (*)     Lymphocyte % 16.3 (*)     Immature Grans % 2.1 (*)     Immature Grans, Absolute 0.18 (*)     nRBC 1.9 (*)     All other components within normal limits   URINE DRUG SCREEN - Abnormal; Notable for the following components:    Benzodiazepine Screen, Urine Positive (*)     All other components within normal limits    Narrative:     Cutoff For Drugs Screened:    Amphetamines               500 ng/ml  Barbiturates               200 ng/ml  Benzodiazepines            150 ng/ml  Cocaine                    150 ng/ml  Methadone                  200 ng/ml  Opiates                     100 ng/ml  Phencyclidine               25 ng/ml  THC                         50 ng/ml  Methamphetamine            500 ng/ml  Tricyclic Antidepressants  300 ng/ml  Oxycodone                  100 ng/ml  Buprenorphine               10 ng/ml    The normal value for all drugs tested is negative. This report includes unconfirmed screening results, with the cutoff values listed, to be used for medical treatment purposes only.  Unconfirmed results must not be used for non-medical purposes such as employment or legal testing.  Clinical consideration should be applied to any drug of abuse test, particularly when unconfirmed results are used.     AMMONIA - Abnormal; Notable for the following components:    Ammonia 13 (*)     All other components within normal limits   URINALYSIS, MICROSCOPIC ONLY - Abnormal; Notable for the following components:    RBC, UA 3-5 (*)     WBC, UA 11-20 (*)     All other components within normal limits   LIPASE - Normal   ETHANOL - Normal    Narrative:     Elevated lactic acid concentration and lactate dehydrogenase(LD) activity may falsely elevate enzymatically determined ethanol levels. Not for legal purposes.    TSH - Normal   T4, FREE - Normal   PROTIME-INR - Normal   MAGNESIUM - Normal   FENTANYL, URINE - Normal    Narrative:     Negative Threshold:      Fentanyl 5 ng/mL     The normal value for the drug tested is negative. This report includes final unconfirmed screening results to be used for medical treatment purposes only. Unconfirmed results must not be used for non-medical purposes such as employment or legal testing. Clinical consideration should be applied to any drug of abuse test, particularly when unconfirmed results are used.          RAINBOW DRAW    Narrative:     The following orders were created for panel order [place] Draw.  Procedure                               Abnormality         Status                     ---------                               -----------          ------                     Green Top (Gel)[[id number]]                                  Final result               Lavender Top[[id number]]                                     Final result               Gold Top - SST[[id number]]                                   Final result               Gray Top[[id number]]                                         Final result               Light Blue Top[[id number]]                                   Final result                 Please view results for these tests on the individual orders.   VITAMIN B12   FOLATE   CBC AND DIFFERENTIAL    Narrative:     The following orders were created for panel order CBC & Differential.  Procedure                               Abnormality         Status                     ---------                               -----------         ------                     CBC Auto Differential[[id number]]        Abnormal            Final result                 Please view results for these tests on the individual orders.   GREEN TOP   LAVENDER TOP   GOLD TOP - SST   GRAY TOP   LIGHT BLUE TOP       Meds Given in ED:   Medications   Sodium Chloride (PF) 0.9 % 10 mL (has no administration in time range)   thiamine (B-1) 500 mg in sodium chloride 0.9 % 100 mL IVPB (has no administration in time range)   sodium chloride 0.9 % infusion 1,000 mL (0 mL Intravenous Stopped 24 1416)   folic acid 1 mg in sodium chloride 0.9 % 50 mL IVPB (0 mg Intravenous Stopped 24 1345)   thiamine (B-1) injection 200 mg (200 mg Intravenous Given 24 1228)   ondansetron (ZOFRAN) injection 4 mg (4 mg Intravenous Given 24 1307)           Last NIH score:                                                          Dysphagia screening results:        [place] Coma Scale:  No data recorded     CIWA:  CIWA-Ar Scoring (Adult)  Nausea and Vomitin-->mild nausea with no vomiting (24 1240)  Tremor: 0-->no tremor (24 1240)  Paroxysmal Sweats: 0-->no sweat visible (24  1240)  Anxiety: 0-->no anxiety, at ease (05/29/24 1240)  Agitation: 1-->somewhat more than normal activity (05/29/24 1240)  Tactile Disturbances: 0-->none (05/29/24 1240)  Auditory Disturbances: 0-->not present (05/29/24 1240)  Visual Disturbances: 0-->not present (05/29/24 1240)  Headache, Fullness in Head: 0-->not present (05/29/24 1240)  Orientation and Clouding of Sensorium: 0-->oriented and can do serial additions (05/29/24 1240)  CIWA-Ar Score: 2 (05/29/24 1240)     Restraint Type:            Isolation Status:  No active isolations

## 2024-05-30 LAB
ALBUMIN SERPL-MCNC: 2.9 G/DL (ref 3.5–5.2)
ALBUMIN/GLOB SERPL: 0.8 G/DL
ALP SERPL-CCNC: 568 U/L (ref 39–117)
ALT SERPL W P-5'-P-CCNC: 71 U/L (ref 1–41)
ANION GAP SERPL CALCULATED.3IONS-SCNC: 10 MMOL/L (ref 5–15)
AST SERPL-CCNC: 96 U/L (ref 1–40)
BASOPHILS # BLD AUTO: 0.03 10*3/MM3 (ref 0–0.2)
BASOPHILS NFR BLD AUTO: 0.3 % (ref 0–1.5)
BILIRUB SERPL-MCNC: 4 MG/DL (ref 0–1.2)
BILIRUB UR QL STRIP: ABNORMAL
BUN SERPL-MCNC: 10 MG/DL (ref 6–20)
BUN/CREAT SERPL: 10.6 (ref 7–25)
CALCIUM SPEC-SCNC: 8.1 MG/DL (ref 8.6–10.5)
CHLORIDE SERPL-SCNC: 99 MMOL/L (ref 98–107)
CLARITY UR: CLEAR
CO2 SERPL-SCNC: 29 MMOL/L (ref 22–29)
COLOR UR: ABNORMAL
CREAT SERPL-MCNC: 0.94 MG/DL (ref 0.76–1.27)
DEPRECATED RDW RBC AUTO: 53 FL (ref 37–54)
EGFRCR SERPLBLD CKD-EPI 2021: 99.4 ML/MIN/1.73
EOSINOPHIL # BLD AUTO: 0.06 10*3/MM3 (ref 0–0.4)
EOSINOPHIL NFR BLD AUTO: 0.6 % (ref 0.3–6.2)
ERYTHROCYTE [DISTWIDTH] IN BLOOD BY AUTOMATED COUNT: 18.7 % (ref 12.3–15.4)
GLOBULIN UR ELPH-MCNC: 3.5 GM/DL
GLUCOSE BLDC GLUCOMTR-MCNC: 138 MG/DL (ref 70–130)
GLUCOSE BLDC GLUCOMTR-MCNC: 225 MG/DL (ref 70–130)
GLUCOSE BLDC GLUCOMTR-MCNC: 226 MG/DL (ref 70–130)
GLUCOSE BLDC GLUCOMTR-MCNC: 262 MG/DL (ref 70–130)
GLUCOSE SERPL-MCNC: 256 MG/DL (ref 65–99)
GLUCOSE UR STRIP-MCNC: ABNORMAL MG/DL
HBA1C MFR BLD: 7.5 % (ref 4.8–5.6)
HCT VFR BLD AUTO: 33.2 % (ref 37.5–51)
HGB BLD-MCNC: 10.8 G/DL (ref 13–17.7)
HGB UR QL STRIP.AUTO: NEGATIVE
IMM GRANULOCYTES # BLD AUTO: 0.17 10*3/MM3 (ref 0–0.05)
IMM GRANULOCYTES NFR BLD AUTO: 1.7 % (ref 0–0.5)
KETONES UR QL STRIP: NEGATIVE
LEUKOCYTE ESTERASE UR QL STRIP.AUTO: NEGATIVE
LYMPHOCYTES # BLD AUTO: 1.52 10*3/MM3 (ref 0.7–3.1)
LYMPHOCYTES NFR BLD AUTO: 15.1 % (ref 19.6–45.3)
MCH RBC QN AUTO: 27 PG (ref 26.6–33)
MCHC RBC AUTO-ENTMCNC: 32.5 G/DL (ref 31.5–35.7)
MCV RBC AUTO: 83 FL (ref 79–97)
MONOCYTES # BLD AUTO: 0.81 10*3/MM3 (ref 0.1–0.9)
MONOCYTES NFR BLD AUTO: 8.1 % (ref 5–12)
NEUTROPHILS NFR BLD AUTO: 7.47 10*3/MM3 (ref 1.7–7)
NEUTROPHILS NFR BLD AUTO: 74.2 % (ref 42.7–76)
NITRITE UR QL STRIP: NEGATIVE
NRBC BLD AUTO-RTO: 0.8 /100 WBC (ref 0–0.2)
PH UR STRIP.AUTO: 7 [PH] (ref 5–8)
PLATELET # BLD AUTO: 368 10*3/MM3 (ref 140–450)
PMV BLD AUTO: 10.9 FL (ref 6–12)
POTASSIUM SERPL-SCNC: 3.2 MMOL/L (ref 3.5–5.2)
POTASSIUM SERPL-SCNC: 3.3 MMOL/L (ref 3.5–5.2)
PROT SERPL-MCNC: 6.4 G/DL (ref 6–8.5)
PROT UR QL STRIP: ABNORMAL
RBC # BLD AUTO: 4 10*6/MM3 (ref 4.14–5.8)
SODIUM SERPL-SCNC: 138 MMOL/L (ref 136–145)
SP GR UR STRIP: 1.02 (ref 1–1.03)
UROBILINOGEN UR QL STRIP: ABNORMAL
WBC NRBC COR # BLD AUTO: 10.06 10*3/MM3 (ref 3.4–10.8)

## 2024-05-30 PROCEDURE — 84132 ASSAY OF SERUM POTASSIUM: CPT | Performed by: INTERNAL MEDICINE

## 2024-05-30 PROCEDURE — 85025 COMPLETE CBC W/AUTO DIFF WBC: CPT | Performed by: INTERNAL MEDICINE

## 2024-05-30 PROCEDURE — 25010000002 ONDANSETRON PER 1 MG: Performed by: INTERNAL MEDICINE

## 2024-05-30 PROCEDURE — 63710000001 INSULIN GLARGINE PER 5 UNITS: Performed by: INTERNAL MEDICINE

## 2024-05-30 PROCEDURE — 97530 THERAPEUTIC ACTIVITIES: CPT

## 2024-05-30 PROCEDURE — 80053 COMPREHEN METABOLIC PANEL: CPT | Performed by: INTERNAL MEDICINE

## 2024-05-30 PROCEDURE — 63710000001 INSULIN LISPRO (HUMAN) PER 5 UNITS: Performed by: INTERNAL MEDICINE

## 2024-05-30 PROCEDURE — 96376 TX/PRO/DX INJ SAME DRUG ADON: CPT

## 2024-05-30 PROCEDURE — 83036 HEMOGLOBIN GLYCOSYLATED A1C: CPT | Performed by: INTERNAL MEDICINE

## 2024-05-30 PROCEDURE — 99223 1ST HOSP IP/OBS HIGH 75: CPT | Performed by: STUDENT IN AN ORGANIZED HEALTH CARE EDUCATION/TRAINING PROGRAM

## 2024-05-30 PROCEDURE — 97165 OT EVAL LOW COMPLEX 30 MIN: CPT

## 2024-05-30 PROCEDURE — 99232 SBSQ HOSP IP/OBS MODERATE 35: CPT | Performed by: INTERNAL MEDICINE

## 2024-05-30 PROCEDURE — 97535 SELF CARE MNGMENT TRAINING: CPT

## 2024-05-30 PROCEDURE — 82948 REAGENT STRIP/BLOOD GLUCOSE: CPT

## 2024-05-30 PROCEDURE — 81003 URINALYSIS AUTO W/O SCOPE: CPT | Performed by: INTERNAL MEDICINE

## 2024-05-30 PROCEDURE — 25010000002 THIAMINE PER 100 MG: Performed by: INTERNAL MEDICINE

## 2024-05-30 RX ORDER — HYDROXYZINE HYDROCHLORIDE 25 MG/1
50 TABLET, FILM COATED ORAL 4 TIMES DAILY PRN
Status: DISCONTINUED | OUTPATIENT
Start: 2024-05-30 | End: 2024-05-31 | Stop reason: HOSPADM

## 2024-05-30 RX ORDER — POTASSIUM CHLORIDE 20 MEQ/1
40 TABLET, EXTENDED RELEASE ORAL EVERY 4 HOURS
Status: COMPLETED | OUTPATIENT
Start: 2024-05-30 | End: 2024-05-30

## 2024-05-30 RX ORDER — INSULIN LISPRO 100 [IU]/ML
2-9 INJECTION, SOLUTION INTRAVENOUS; SUBCUTANEOUS
Status: DISCONTINUED | OUTPATIENT
Start: 2024-05-30 | End: 2024-05-31 | Stop reason: HOSPADM

## 2024-05-30 RX ORDER — POTASSIUM CHLORIDE 750 MG/1
40 CAPSULE, EXTENDED RELEASE ORAL EVERY 4 HOURS
Status: COMPLETED | OUTPATIENT
Start: 2024-05-30 | End: 2024-05-30

## 2024-05-30 RX ORDER — UREA 10 %
5 LOTION (ML) TOPICAL NIGHTLY
Status: DISCONTINUED | OUTPATIENT
Start: 2024-05-30 | End: 2024-05-31 | Stop reason: HOSPADM

## 2024-05-30 RX ADMIN — BISACODYL 5 MG: 5 TABLET, COATED ORAL at 11:10

## 2024-05-30 RX ADMIN — ACETAMINOPHEN 650 MG: 325 TABLET ORAL at 03:56

## 2024-05-30 RX ADMIN — ONDANSETRON 4 MG: 2 INJECTION INTRAMUSCULAR; INTRAVENOUS at 21:21

## 2024-05-30 RX ADMIN — INSULIN LISPRO 4 UNITS: 100 INJECTION, SOLUTION INTRAVENOUS; SUBCUTANEOUS at 12:38

## 2024-05-30 RX ADMIN — POTASSIUM CHLORIDE 40 MEQ: 1500 TABLET, EXTENDED RELEASE ORAL at 20:13

## 2024-05-30 RX ADMIN — METOPROLOL TARTRATE 50 MG: 50 TABLET, FILM COATED ORAL at 20:13

## 2024-05-30 RX ADMIN — POTASSIUM CHLORIDE 40 MEQ: 1500 TABLET, EXTENDED RELEASE ORAL at 16:23

## 2024-05-30 RX ADMIN — THIAMINE HYDROCHLORIDE 500 MG: 100 INJECTION, SOLUTION INTRAMUSCULAR; INTRAVENOUS at 09:29

## 2024-05-30 RX ADMIN — ROSUVASTATIN 5 MG: 10 TABLET, FILM COATED ORAL at 20:14

## 2024-05-30 RX ADMIN — Medication 10 ML: at 20:14

## 2024-05-30 RX ADMIN — BUSPIRONE HYDROCHLORIDE 15 MG: 10 TABLET ORAL at 20:13

## 2024-05-30 RX ADMIN — VALSARTAN 160 MG: 160 TABLET, FILM COATED ORAL at 09:22

## 2024-05-30 RX ADMIN — AMLODIPINE BESYLATE 10 MG: 10 TABLET ORAL at 09:22

## 2024-05-30 RX ADMIN — SENNOSIDES AND DOCUSATE SODIUM 2 TABLET: 50; 8.6 TABLET ORAL at 11:10

## 2024-05-30 RX ADMIN — INSULIN LISPRO 6 UNITS: 100 INJECTION, SOLUTION INTRAVENOUS; SUBCUTANEOUS at 20:14

## 2024-05-30 RX ADMIN — METOPROLOL TARTRATE 50 MG: 50 TABLET, FILM COATED ORAL at 09:22

## 2024-05-30 RX ADMIN — INSULIN LISPRO 4 UNITS: 100 INJECTION, SOLUTION INTRAVENOUS; SUBCUTANEOUS at 09:21

## 2024-05-30 RX ADMIN — ONDANSETRON 4 MG: 2 INJECTION INTRAMUSCULAR; INTRAVENOUS at 15:04

## 2024-05-30 RX ADMIN — HYDROXYZINE HYDROCHLORIDE 50 MG: 25 TABLET ORAL at 22:53

## 2024-05-30 RX ADMIN — Medication 5 MG: at 20:13

## 2024-05-30 RX ADMIN — Medication 10 ML: at 09:21

## 2024-05-30 RX ADMIN — HYDROXYZINE HYDROCHLORIDE 50 MG: 25 TABLET ORAL at 16:24

## 2024-05-30 RX ADMIN — INSULIN GLARGINE 5 UNITS: 100 INJECTION, SOLUTION SUBCUTANEOUS at 20:14

## 2024-05-30 RX ADMIN — BUSPIRONE HYDROCHLORIDE 15 MG: 10 TABLET ORAL at 09:23

## 2024-05-30 RX ADMIN — BUPROPION HYDROCHLORIDE 300 MG: 150 TABLET, EXTENDED RELEASE ORAL at 09:24

## 2024-05-30 RX ADMIN — PANTOPRAZOLE SODIUM 40 MG: 40 TABLET, DELAYED RELEASE ORAL at 05:30

## 2024-05-30 RX ADMIN — THIAMINE HYDROCHLORIDE 500 MG: 100 INJECTION, SOLUTION INTRAMUSCULAR; INTRAVENOUS at 15:09

## 2024-05-30 RX ADMIN — POTASSIUM CHLORIDE 40 MEQ: 750 CAPSULE, EXTENDED RELEASE ORAL at 05:30

## 2024-05-30 RX ADMIN — POLYETHYLENE GLYCOL 3350 17 G: 17 POWDER, FOR SOLUTION ORAL at 11:10

## 2024-05-30 RX ADMIN — THIAMINE HYDROCHLORIDE 500 MG: 100 INJECTION, SOLUTION INTRAMUSCULAR; INTRAVENOUS at 20:23

## 2024-05-30 RX ADMIN — POTASSIUM CHLORIDE 40 MEQ: 750 CAPSULE, EXTENDED RELEASE ORAL at 09:28

## 2024-05-30 NOTE — PROGRESS NOTES
[facility] Medicine Services  PROGRESS NOTE    Patient Name: Terrence James  : 1975  MRN: [id number]    Date of Admission: 2024  Primary Care Physician: Pino Borrero MD    Subjective   Subjective     CC:  Ataxia, lethargy    HPI:  Patient resting in bed. Still feels off from baseline. Asking about something to help with anxiety. States he mostly drinks to help him sleep at night and to curb his anxiety.      Objective   Objective     Vital Signs:   Temp:  [98 °F (36.7 °C)-98.5 °F (36.9 °C)] 98 °F (36.7 °C)  Heart Rate:  [65-92] (P) 82  Resp:  [16-18] 18  BP: (131-156)/() (P) 146/101  Flow (L/min):  [1-2] (P) 2     Physical Exam:  Constitutional: No acute distress, awake, alert  HENT: NCAT, mucous membranes moist  Respiratory: Clear to auscultation bilaterally, respiratory effort normal   Cardiovascular: RRR, no murmurs, rubs, or gallops  Gastrointestinal: soft, nontender, nondistended  Musculoskeletal: No bilateral ankle edema  Psychiatric: Appropriate affect, cooperative  Neurologic: oriented x3, speech is slow and slurred at times, did not assess gait  Skin: No rashes      Results Reviewed:  LAB RESULTS:      Lab 24  03424  1139 24  1233   WBC 10.06 8.58  --    HEMOGLOBIN 10.8* 10.8*  --    HEMATOCRIT 33.2* 33.0*  --    PLATELETS 368 322  --    NEUTROS ABS 7.47* 6.10  --    IMMATURE GRANS (ABS) 0.17* 0.18*  --    LYMPHS ABS 1.52 1.40  --    MONOS ABS 0.81 0.82  --    EOS ABS 0.06 0.05  --    MCV 83.0 84.0  --    PROTIME  --  14.5 10.4   APTT  --   --  26.6         Lab 24  03424  1139 24  0444 24  0450   SODIUM 138 136  --   --    POTASSIUM 3.2* 3.7  --   --    CHLORIDE 99 97*  --   --    CO2 29.0 29.0  --   --    ANION GAP 10.0 10.0  --   --    BUN 10 11  --   --    CREATININE 0.94 1.09  --   --    EGFR 99.4 83.2  --   --    GLUCOSE 256* 237*  --   --    CALCIUM 8.1* 8.2*  --   --    MAGNESIUM  --  2.2 2.3 1.7   TSH   --  2.000  --   --          Lab 05/30/24  0341 05/29/24  1139 05/25/24  0450   TOTAL PROTEIN 6.4 6.3  --    ALBUMIN 2.9* 2.9*  --    GLOBULIN 3.5 3.4  --    ALT (SGPT) 71* 66*  --    AST (SGOT) 96* 77*  --    BILIRUBIN 4.0* 4.0*  --    ALK PHOS 568* 479*  --    LIPASE  --  46 43         Lab 05/29/24  1139 05/25/24  1233   PROTIME 14.5 10.4   INR 1.11 0.94             Lab 05/29/24  1139   FOLATE 10.40   VITAMIN B 12 1,707*         Brief Urine Lab Results  (Last result in the past 365 days)        Color   Clarity   Blood   Leuk Est   Nitrite   Protein   CREAT   Urine HCG        05/29/24 1143 Dark Yellow   Cloudy   Negative   Small (1+)   Positive   30 mg/dL (1+)                   Microbiology Results Abnormal       None            MRI Brain With & Without Contrast    Result Date: 5/29/2024  MRI BRAIN W WO CONTRAST Date of Exam: 5/29/2024 8:33 PM EDT Indication: ams/gait ataxia.  Comparison: Same day head CT Technique:  Routine multiplanar/multisequence sequence images of the brain were obtained before and after the uneventful administration of Multihance. Findings: No abnormal restricted diffusion or evidence of an acute infarct. There is no evidence of hemorrhage. No mass effect, edema or midline shift. No abnormal intracranial enhancement. Mild periventricular T2/FLAIR hyperintensities, nonspecific but most likely represents chronic small vessel ischemic changes. No extra-axial fluid collection. The ventricles are normal in size and configuration. The visualized orbits are unremarkable. The visualized paranasal sinuses and mastoid air cells are clear. The visualized soft tissues are unremarkable. No acute osseous abnormality.     Impression: Impression: No acute intracranial abnormality. No abnormal intracranial enhancement. Mild periventricular T2/FLAIR hyperintensities are nonspecific but most likely represents chronic small vessel ischemic changes. Electronically Signed: Adam Driver DO  5/29/2024 10:04 PM  EDT  Workstation ID: [id number]    CT Head Without Contrast    Result Date: 5/29/2024  CT HEAD WO CONTRAST Date of Exam: 5/29/2024 12:10 PM EDT Indication: ams. Comparison: None available. Technique: Axial CT images were obtained of the head without contrast administration.  Automated exposure control and iterative construction methods were used. Findings: Gray-white differentiation is maintained and there is no evidence of intracranial hemorrhage, mass or mass effect. The ventricles are normal in size and configuration. The orbits are normal. The paranasal sinuses are clear. The calvarium is intact.     Impression: Impression: No acute intracranial abnormality. Electronically Signed: Doug Tavarez MD  5/29/2024 12:48 PM EDT  Workstation ID: [id number]         Current medications:  Scheduled Meds:amLODIPine, 10 mg, Oral, Q24H   And  valsartan, 160 mg, Oral, Q24H  buPROPion XL, 300 mg, Oral, Daily  busPIRone, 15 mg, Oral, BID  insulin lispro, 2-9 Units, Subcutaneous, 4x Daily AC & at Bedtime  metoprolol tartrate, 50 mg, Oral, BID  pantoprazole, 40 mg, Oral, Q AM  rosuvastatin, 5 mg, Oral, Nightly  sodium chloride, 10 mL, Intravenous, Q12H  [START ON 6/1/2024] thiamine (B-1) IV, 250 mg, Intravenous, Daily  thiamine (B-1) IV, 500 mg, Intravenous, TID      Continuous Infusions:   PRN Meds:.  acetaminophen    senna-docusate sodium **AND** polyethylene glycol **AND** bisacodyl **AND** bisacodyl    Calcium Replacement - Follow Nurse / BPA Driven Protocol    dextrose    dextrose    glucagon (human recombinant)    Magnesium Standard Dose Replacement - Follow Nurse / BPA Driven Protocol    ondansetron    Phosphorus Replacement - Follow Nurse / BPA Driven Protocol    Potassium Replacement - Follow Nurse / BPA Driven Protocol    Sodium Chloride (PF)    sodium chloride    sodium chloride    Assessment & Plan   Assessment & Plan     Active Hospital Problems    Diagnosis  POA    **Ataxia [R27.0]  Yes    Hyperlipidemia LDL goal <70  [E78.5]  Yes    Uncomplicated alcohol dependence [F10.20]  Yes    Type 2 diabetes mellitus with hyperglycemia, without long-term current use of insulin [E11.65]  Yes    Gastroesophageal reflux disease [K21.9]  Yes    Hypertension [I10]  Yes    Coronary artery disease involving native coronary artery of native heart without angina pectoris [I25.10]  Yes    Anxiety and depression [F41.9, F32.A]  Yes      Resolved Hospital Problems   No resolved problems to display.        Brief Hospital Course to date:  Terrence James is a 48 y.o. male with PMHx significant for alcohol abuse (last drink 5/19/24), HTN, anxiety/depression, DMII with recent admission to [place] for alcoholic pancreatitis and alcohol withdrawal discharged 5/28 who presents to [facility] ED with increasing confusion, lethargy and ataxia w/some slurred speech.     AMS/Encephalopathy w/Ataxia  Lethargy/somnolence  - etiology not completely clear, but first concern given his alcohol use and gastric sleeve would be Wernicke/Korsakoff, continue high dose thiamine protocol 500mg IV TID x 2 days, then 250mg IV daily x 5 days  - B12/folate level are WNL, ammonia is WNL, his LFTs are trending down  - presentation also complicated by recent stay at [place] where he received large doses of narcotic pain medications for pancreatitis as well as benzos to treat ETOH withdrawal, was discharged on oral serax and took 2 doses, hold further doses for now  - no clear infectious etiology at this time  - CT head negative, MRI brain negative for stroke  - Neurology consult  - PT/OT to evaluate     Recent Alcoholic Pancreatitis  Transaminitis w/Elevated bili  Hepatic Steatosis  - treated at [place], discharge 5/28  - LFTs appear improved/downtrending, although alk phos remains elevated, Tbili improved per his fiance, lipase is WNL  - abdominal exam is mostly benign, hold on repeating abdominal imaging at this time  - MRCP from 5/26 c/w pancreatitis with cystic lesion likely an IPMN,  GI at [place] recommended repeat MRCP in 6-8 weeks outpatient     ETOH Abuse:  - reports drinking 1/2 of a 5th of vodka daily, has not had a drink since 5/19  - completed inpatient treatment for withdrawal at [facility] w/ISAIAS, discharged on oral serax. Hold further benzos at this time  - add PRN hydroxyzine for anxiety  - chemical dependency consult, patient very motivated to quit    Urinary Incontinence  - repeat UA, initial sample is not a clean catch  - check PVR     DMII:  - hold metformin, cover with SSI for now  - add nightly lantus     HTN:  - continue home norvasc/losartan    HLD:  - continue statin     Anxiety/Depression:  - continue home buspar, wellbutrin  - consider addition of something to aid w/sleep    Expected Discharge Location and Transportation: Home  Expected Discharge   Expected Discharge Date: 6/3/2024; Expected Discharge Time:      DVT prophylaxis:  Mechanical DVT prophylaxis orders are present.              CODE STATUS:   Code Status and Medical Interventions:   Ordered at: 05/29/24 1443     Level Of Support Discussed With:    Patient     Code Status (Patient has no pulse and is not breathing):    CPR (Attempt to Resuscitate)     Medical Interventions (Patient has pulse or is breathing):    Full Support       Osiris Gallagher, DO  05/30/24

## 2024-05-30 NOTE — PLAN OF CARE
Goal Outcome Evaluation:  Plan of Care Reviewed With: (P) patient        Progress: (P) no change (OT initial eval)  Outcome Evaluation: (P) Pt presents below baseline with impaired coordination to perform functional tasks, impaired balance, impulsivity, and impaired safety precaution awareness. Donned socks with set up A and increased time for processing. Performed grooming tasks sink side with CGA. Pt demo'd instability veering to the L during ADL mobility. Reaching for external support, but declined AD. Recommend IRF at D/C.      Anticipated Discharge Disposition (OT): (P) inpatient rehabilitation facility

## 2024-05-30 NOTE — CASE MANAGEMENT/SOCIAL WORK
"Continued Stay Note  [facility]     Patient Name: Terrence James  MRN: [id number]  Today's Date: 5/30/2024    Admit Date: 5/29/2024    Plan: Declines assistance from our team at this time   Discharge Plan       Row Name 05/30/24 1831       Plan    Plan Declines assistance from our team at this time    Plan Comments Consult received- thank you.      HPI:  48 y.o. male with PMHx significant for alcohol abuse (last drink 5/19), HTN, anxiety/depression, DMII with recent admission to [place] for alcoholic pancreatitis discharged 5/28 who presents to [facility] ED with increasing confusion, lethargy and ataxia w/some slurred speech. Patient states his last drink was about 10 days ago, he says he normally drinks about 1/2 of a 5th of vodka daily, has had several periods of sobriety mixed in with his years of drinking and would like to quit for good.     Admission labs:  BAL  <10 UDS BZO  LFT's- AST/ALT/Total bili= 77/66/4.0  Lipase= 46  Ammonia= 13  No CIWA scores (pt has been ETOH free since 5/19    Assessment:  Met with patient this afternoon- he was pleasant and forthcoming.  He states that he used to think he \"didn't have a drinking problem, but a marriage problem.\"  He elaborates.. \"but once I got myself out of the marriage, I thought I could start dating and just handle my alcohol a little better.\"  He now realizes that he does, in fact, have a drinking problem.  When we asked what our team could do to help him on his sobriety journey he replied \"well, nothing, I know what to do, I've worked the steps, so I can do this on my own.\"    I am not sure that the patient has realistic expectations regarding complete cessation of ETOH- however, he appears resolute, however, we did provide our outreach numbers for continued support post d/c.    Outreach:  Encouraged patient to reach out to the Addiction Medicine Team ([phone]) at any time post discharge for continued assistance and support.     Nothing further to add " from an addiction perspective.                     Discharge Codes    No documentation.                 Expected Discharge Date and Time       Expected Discharge Date Expected Discharge Time    Kalin 3, 2024               Adriana Gomes RN MA,BSN-  Addiction Medicine

## 2024-05-30 NOTE — CASE MANAGEMENT/SOCIAL WORK
Discharge Planning Assessment  [facility]     Patient Name: Terrence James  MRN: [id number]  Today's Date: 5/30/2024    Admit Date: 5/29/2024    Plan: Home   Discharge Needs Assessment       Row Name 05/30/24 1344       Living Environment    People in Home significant other    Name(s) of People in Home Yesika Stewart  Significant Other  [phone]    Primary Care Provided by self    Provides Primary Care For no one    Family Caregiver if Needed significant other    Family Caregiver Names Yesika Stewart  Significant Other  [phone]    Quality of Family Relationships helpful;involved;supportive    Able to Return to Prior Arrangements yes       Transition Planning    Patient/Family Anticipates Transition to home with family    Patient/Family Anticipated Services at Transition     Transportation Anticipated family or friend will provide       Discharge Needs Assessment    Equipment Currently Used at Home none                   Discharge Plan       Row Name 05/30/24 1345       Plan    Plan Home    Patient/Family in Agreement with Plan yes    Plan Comments Spoke with Mr. James and his Significant Other in his room, to initiate discharge planning. He lives in [place] and his Significant Other lives with him. He verified he has [employer]. He has prescription coverage and use [facility] on [place] [employer], to get his prescriptions filled. His PCP is Pino Borrero MD. Prior to admission, he was independent with ADL's. He uses no medical equipment at home and he is not current with home health. His plan is to go home at discharge. Family will transport at discharge. Await therapy recommendations to determine proper discharge placement or plan. CM will continue to follow.    Final Discharge Disposition Code 01 - home or self-care                  Continued Care and Services - Admitted Since 5/29/2024    No active coordination exists for this encounter.       Expected  Discharge Date and Time       Expected Discharge Date Expected Discharge Time    Kalin 3, 2024            Demographic Summary       Row Name 05/30/24 1343       General Information    Admission Type inpatient    Arrived From emergency department    Referral Source admission list    Reason for Consult discharge planning    Preferred Language English       Contact Information    Permission Granted to Share Info With     Contact Information Obtained for                    Functional Status       Row Name 05/30/24 1343       Functional Status    Usual Activity Tolerance moderate    Current Activity Tolerance moderate       Functional Status, IADL    Meal Preparation independent    Housekeeping assistive person    Laundry assistive person    Shopping assistive person                   Psychosocial    No documentation.                  Abuse/Neglect    No documentation.                  Legal    No documentation.                  Substance Abuse    No documentation.                  Patient Forms    No documentation.                     Ghada Dugan RN

## 2024-05-30 NOTE — THERAPY EVALUATION
Patient Name: Terrence James  : 1975    MRN: [id number]                              Today's Date: 2024       Admit Date: 2024    Visit Dx:     ICD-10-CM ICD-9-CM   1. Altered mental status, unspecified altered mental status type  R41.82 780.97   2. Ataxia  R27.0 781.3   3. History of alcohol abuse  F10.11 305.03   4. Abnormal LFTs  R79.89 790.6     Patient Active Problem List   Diagnosis    Anxiety and depression    DVT (deep venous thrombosis)    Coronary artery disease involving native coronary artery of native heart without angina pectoris    Tinnitus aurium, bilateral    Hypertension    Current moderate episode of major depressive disorder without prior episode    Well adult exam    Vitamin D deficiency    Colon cancer screening    Elevated serum creatinine    Gastroesophageal reflux disease    Annual physical exam    Low testosterone    Type 2 diabetes mellitus with hyperglycemia, without long-term current use of insulin    Uncomplicated alcohol dependence    Combined arterial insufficiency and corporo-venous occlusive erectile dysfunction    Hyperlipidemia LDL goal <70    Ataxia     Past Medical History:   Diagnosis Date    Anxiety     COVID-19 2021    DVT (deep venous thrombosis)     LEFT LEG     Hypertension     Wears reading eyeglasses      Past Surgical History:   Procedure Laterality Date    CARDIAC CATHETERIZATION  2019    CORONARY STENT PLACEMENT  2019    EAR TUBES Bilateral 2012    FEMORAL THROMBECTOMY/EMBOLECTOMY Left 10/20/2021    Procedure: ULTRASOUND GUIDED ACCESS; LEFT LEG VENOGRAM; INTRAVENOUS ULTRASOUND OF FEMORAL VEIN, POPLITEAL VEIN, EXTERNAL AND COMMON ILIAC VEINS; LEFT POPLITEAL VEIN ANGIOPLASTY; LEFT COMMON ILIAC VEIN ANGIOPLASTY; POPLITEAL AND FEMORAL VEIN THROMBECTOMY - LEFT;  Surgeon: Arnel Gordon MD;  Location: [facility];  Service: Vascular;  Laterality: Left;  FLUORO:3 MIN 48 SEC  DOSE: 45 MGY  CONTRAS    GASTRIC SLEEVE LAPAROSCOPIC  2014     LUMBAR DISCECTOMY  2006    L3-4    VASECTOMY        General Information       Row Name 05/30/24 0904          OT Time and Intention    Document Type evaluation (P)   -LR     Mode of Treatment individual therapy;occupational therapy (P)   -LR       Row Name 05/30/24 0904          General Information    Patient Profile Reviewed yes (P)   -LR     Prior Level of Function independent:;ADL's;driving;home management;work;using stairs;all household mobility;community mobility;gait;transfer (P)   denied any falls  -LR     Existing Precautions/Restrictions seizures;fall (P)   -LR     Barriers to Rehab ineffective coping (P)   -LR       Row Name 05/30/24 0904          Occupational Profile    Environmental Supports and Barriers (Occupational Profile) Walk in shower, raised toilet seat, no DME or AE prior to admission, reports no falls in the last 6 months (P)   -LR     Patient Goals (Occupational Profile) Return to baseline (P)   -LR       Row Name 05/30/24 0904          Living Environment    People in Home significant other (P)   -LR       Row Name 05/30/24 0904          Home Main Entrance    Number of Stairs, Main Entrance three (P)   -LR     Stair Railings, Main Entrance none (P)   -LR       Row Name 05/30/24 0904          Stairs Within Home, Primary    Stairs, Within Home, Primary Pt says that he needs to go upstairs to bedroom but can stay on first floor if needed (P)   -LR     Number of Stairs, Within Home, Primary other (see comments) (P)   Flight of stairs  -LR     Stair Railings, Within Home, Primary railing on right side (ascending) (P)   -LR       Row Name 05/30/24 0904          Cognition    Orientation Status (Cognition) oriented to;person;time;verbal cues/prompts needed for orientation;place (P)   -LR       Row Name 05/30/24 0904          Safety Issues, Functional Mobility    Safety Issues Affecting Function (Mobility) at risk behavior observed;awareness of need for assistance;impulsivity;insight into  deficits/self-awareness;judgment;safety precautions follow-through/compliance;safety precaution awareness;ability to follow commands (P)   -LR     Impairments Affecting Function (Mobility) balance;cognition;coordination;endurance/activity tolerance (P)   -LR     Cognitive Impairments, Mobility Safety/Performance awareness, need for assistance;impulsivity;insight into deficits/self-awareness;judgment;safety precaution awareness;safety precaution follow-through;sequencing abilities (P)   -LR               User Key  (r) = Recorded By, (t) = Taken By, (c) = Cosigned By      Initials Name Provider Type    LR Guerline Wright, OT Student OT Student                     Mobility/ADL's       Row Name 05/30/24 0912          Bed Mobility    Bed Mobility sit-supine (P)   -LR     Sit-Supine [place] (Bed Mobility) standby assist;verbal cues;other (see comments) (P)   Educated pt on safe positioning in bed  -LR     Comment, (Bed Mobility) Upon arrival, pt was sitting EOB with bed alarm off. He stated that he was aware of how to turn the bed alarm off. RN notified after session.  (P)   -LR       Row Name 05/30/24 0912          Transfers    Transfers sit-stand transfer;stand-sit transfer;toilet transfer (P)   -LR     Comment, (Transfers) VC to ensure closeness to seated surface (P)   -LR       Row Name 05/30/24 0912          Sit-Stand Transfer    Sit-Stand [place] (Transfers) contact guard;verbal cues (P)   -LR       Row Name 05/30/24 0912          Stand-Sit Transfer    Stand-Sit [place] (Transfers) contact guard;verbal cues (P)   -LR       Row Name 05/30/24 0912          Toilet Transfer    Type (Toilet Transfer) sit-stand;stand-sit (P)   -LR     [place] Level (Toilet Transfer) contact guard;nonverbal cues (demo/gesture);verbal cues (P)   -LR     Assistive Device (Toilet Transfer) commode;raised toilet seat (P)   -LR     Comment, (Toilet Transfer) Pt reaching for external support. Pt declined AD options. No LOB  noted (P)   -LR       Row Name 05/30/24 0912          Functional Mobility    Functional Mobility- Ind. Level contact guard assist;nonverbal cues required (demo/gesture);verbal cues required (P)   -LR     Functional Mobility-Distance (Feet) -- (P)   in room and on unit for ADL purposes  -LR     Functional Mobility- Safety Issues sequencing ability decreased (P)   -LR     Functional Mobility- Comment Pt able to complete dynamic balance tasks looking L & R, up/down, take steps backwards, and picking up an item off the ground.  Unsteady/veering L>R (P)   -LR     Patient was able to Ambulate yes (P)   -LR       Row Name 05/30/24 0912          Activities of Daily Living    BADL Assessment/Intervention upper body dressing;lower body dressing;grooming;toileting (P)   -LR       Row Name 05/30/24 0912          Upper Body Dressing Assessment/Training    [place] Level (Upper Body Dressing) don;pajama/robe;minimum assist (75% patient effort) (P)   -LR     Position (Upper Body Dressing) edge of bed sitting (P)   -LR     Comment, (Upper Body Dressing) Required posterior managing and tying assistance at gown (P)   -LR       Row Name 05/30/24 0912          Lower Body Dressing Assessment/Training    [place] Level (Lower Body Dressing) don;socks;set up;verbal cues (P)   -LR     Position (Lower Body Dressing) edge of bed sitting (P)   -LR     Comment, (Lower Body Dressing) Increased time for processing and sequencing of task (P)   -LR       Row Name 05/30/24 0912          Grooming Assessment/Training    [place] Level (Grooming) wash face, hands;contact guard assist;oral care regimen (P)   -LR     Oral Care teeth brushed - regular toothbrush (P)   -LR     Position (Grooming) sink side;other (see comments) (P)   CGA  -LR     Comment, (Grooming) No A needed for task. CGA for balance at sink (P)   -LR       Row Name 05/30/24 0912          Toileting Assessment/Training    [place] Level (Toileting) not tested (P)   -LR      Assistive Devices (Toileting) commode;grab bar/safety frame;raised toilet seat (P)   -LR     Comment, (Toileting) Pt declined toileting with therapist present. Notified nursing (P)   -LR               User Key  (r) = Recorded By, (t) = Taken By, (c) = Cosigned By      Initials Name Provider Type    LR Guerline Wright, OT Student OT Student                   Obj/Interventions       Row Name 05/30/24 0922          Sensory Assessment (Somatosensory)    Sensory Assessment (Somatosensory) sensation intact;bilateral UE (P)   -LR     Bilateral UE Sensory Assessment general sensation;light touch awareness;intact (P)   -LR       Row Name 05/30/24 0922          Vision Assessment/Intervention    Visual Impairment/Limitations WFL;corrective lenses for reading (P)   -LR     Vision Assessment Comment Did not present with any acute changes. Able to track up/down, L and R, peripheral input recognized at symmetrical points, (P)   -LR       Row Name 05/30/24 0922          Range of Motion Comprehensive    General Range of Motion bilateral upper extremity ROM WNL (P)   -LR       Row Name 05/30/24 0922          Strength Comprehensive (MMT)    General Manual Muscle Testing (MMT) Assessment no strength deficits identified (P)   -LR     Comment, General Manual Muscle Testing (MMT) Assessment Increased time to perform/process commands when performing UE MMT. B UE 5/5 MMT (P)   -LR       Row Name 05/30/24 0922          Motor Skills    Motor Skills coordination (P)   -LR     Coordination finger to nose;other (see comments);WFL (P)   finger opposition  -LR       Row Name 05/30/24 0922          Balance    Balance Assessment sitting static balance;sitting dynamic balance;standing static balance;standing dynamic balance (P)   -LR     Static Sitting Balance independent (P)   -LR     Dynamic Sitting Balance supervision (P)   -LR     Position, Sitting Balance supported;unsupported;sitting edge of bed (P)   -LR     Static Standing Balance contact  guard (P)   -LR     Dynamic Standing Balance contact guard (P)   -LR     Position/Device Used, Standing Balance unsupported (P)   -LR     Balance Interventions sitting;standing;static;dynamic;occupation based/functional task (P)   -LR               User Key  (r) = Recorded By, (t) = Taken By, (c) = Cosigned By      Initials Name Provider Type    LR Guerline Wright, OT Student OT Student                   Goals/Plan       Row Name 05/30/24 0994          Transfer Goal 1 (OT)    Activity/Assistive Device (Transfer Goal 1, OT) toilet;commode;sit-to-stand/stand-to-sit;bed-to-chair/chair-to-bed (P)   -LR     [place] Level/Cues Needed (Transfer Goal 1, OT) modified independence (P)   -LR     Time Frame (Transfer Goal 1, OT) long term goal (LTG);10 days (P)   -LR     Progress/Outcome (Transfer Goal 1, OT) new goal (P)   -LR       Row Name 05/30/24 0957          Dressing Goal 1 (OT)    Activity/Device (Dressing Goal 1, OT) upper body dressing (P)   -LR     [place]/Cues Needed (Dressing Goal 1, OT) independent (P)   -LR     Time Frame (Dressing Goal 1, OT) long term goal (LTG);10 days (P)   -LR       Row Name 05/30/24 0994          Toileting Goal 1 (OT)    Activity/Device (Toileting Goal 1, OT) adjust/manage clothing;perform perineal hygiene (P)   -LR     [place] Level/Cues Needed (Toileting Goal 1, OT) modified independence (P)   -LR     Time Frame (Toileting Goal 1, OT) short term goal (STG);3 days (P)   -LR       Row Name 05/30/24 0993          Grooming Goal 1 (OT)    Activity/Device (Grooming Goal 1, OT) oral care;wash face, hands (P)   -LR     [place] (Grooming Goal 1, OT) modified independence (P)   -LR     Time Frame (Grooming Goal 1, OT) short term goal (STG);5 days (P)   -LR               User Key  (r) = Recorded By, (t) = Taken By, (c) = Cosigned By      Initials Name Provider Type    LR Guerline Wright, OT Student OT Student                   Clinical Impression       Row Name 05/30/24 2956           Pain Assessment    Pretreatment Pain Rating 0/10 - no pain (P)   -LR     Posttreatment Pain Rating 0/10 - no pain (P)   -LR     Pain Intervention(s) Nursing Notified;Ambulation/increased activity;Repositioned (P)   -LR       Row Name 05/30/24 0927          Plan of Care Review    Plan of Care Reviewed With patient (P)   -LR     Progress no change (P)   OT initial eval  -LR     Outcome Evaluation Pt presents below baseline with impaired coordination to perform functional tasks, impaired balance, impulsivity, and impaired safety precaution awareness. Donned socks with set up A and increased time for processing. Performed grooming tasks sink side with CGA. Pt demo'd instability veering to the L during ADL mobility. Reaching for external support, but declined AD. Recommend IRF at D/C. (P)   -LR       Row Name 05/30/24 0927          Therapy Assessment/Plan (OT)    Rehab Potential (OT) good, to achieve stated therapy goals (P)   -LR     Criteria for Skilled Therapeutic Interventions Met (OT) yes;skilled treatment is necessary (P)   -LR     Therapy Frequency (OT) daily (P)   -LR     Predicted Duration of Therapy Intervention (OT) 2 days (P)   -LR       Row Name 05/30/24 0927          Therapy Plan Review/Discharge Plan (OT)    Anticipated Discharge Disposition (OT) inpatient rehabilitation facility (P)   -LR       Row Name 05/30/24 0927          Vital Signs    Pre Systolic BP Rehab 154 (P)   -LR     Pre Treatment Diastolic  (P)   -LR     Post Systolic BP Rehab 146 (P)   -LR     Post Treatment Diastolic  (P)   -LR     O2 Delivery Pre Treatment room air (P)   -LR     O2 Delivery Intra Treatment room air (P)   -LR     O2 Delivery Post Treatment room air (P)   -LR     Pre Patient Position Sitting (P)   -LR     Intra Patient Position Standing (P)   -LR     Post Patient Position Supine (P)   -LR       Row Name 05/30/24 0927          Positioning and Restraints    Pre-Treatment Position in bed (P)   -LR     Post  Treatment Position bed (P)   -LR     In Bed notified nsg;SCD pump applied;fowlers;call light within reach;encouraged to call for assist;exit alarm on;side rails up x3 (P)   Nursing notified of pt statement about turning off alarms. Seizure pads donned B sides  -LR               User Key  (r) = Recorded By, (t) = Taken By, (c) = Cosigned By      Initials Name Provider Type    LR Guerline Wright, OT Student OT Student                   Outcome Measures       Row Name 05/30/24 0995          How much help from another is currently needed...    Putting on and taking off regular lower body clothing? 3 (P)   -LR     Bathing (including washing, rinsing, and drying) 3 (P)   -LR     Toileting (which includes using toilet bed pan or urinal) 3 (P)   -LR     Putting on and taking off regular upper body clothing 3 (P)   -LR     Taking care of personal grooming (such as brushing teeth) 3 (P)   -LR     Eating meals 3 (P)   -LR     AM-PAC 6 Clicks Score (OT) 18 (P)   -LR       Row Name 05/30/24 0800          How much help from another person do you currently need...    Turning from your back to your side while in flat bed without using bedrails? 4 (P)   -ST     Moving from lying on back to sitting on the side of a flat bed without bedrails? 4 (P)   -ST     Moving to and from a bed to a chair (including a wheelchair)? 3 (P)   -ST     Standing up from a chair using your arms (e.g., wheelchair, bedside chair)? 4 (P)   -ST     Climbing 3-5 steps with a railing? 3 (P)   -ST     To walk in hospital room? 4 (P)   -ST     AM-PAC 6 Clicks Score (PT) 22 (P)   -ST     Highest Level of Mobility Goal 7 --> Walk 25 feet or more (P)   -ST       Row Name 05/30/24 9930          Modified [place] Scale    Pre-Stroke Modified [place] Scale 6 - Unable to determine (UTD) from the medical record documentation (P)   -LR     Modified [place] Scale 3 - Moderate disability.  Requiring some help, but able to walk without assistance. (P)   -LR       Row Name  05/30/24 0952          Functional Assessment    Outcome Measure Options AM-PAC 6 Clicks Daily Activity (OT);Modified Aysha (P)   -LR               User Key  (r) = Recorded By, (t) = Taken By, (c) = Cosigned By      Initials Name Provider Type    Janet Milner, RN Extern Registered Nurse    LR Guerline Wright, OT Student OT Student                    Occupational Therapy Education       Title: PT OT SLP Therapies (In Progress)       Topic: Occupational Therapy (In Progress)       Point: ADL training (In Progress)       Description:   Instruct learner(s) on proper safety adaptation and remediation techniques during self care or transfers.   Instruct in proper use of assistive devices.                  Learning Progress Summary             Patient Acceptance, E, NR by LR at 5/30/2024 0830                         Point: Precautions (In Progress)       Description:   Instruct learner(s) on prescribed precautions during self-care and functional transfers.                  Learning Progress Summary             Patient Acceptance, E, NR by LR at 5/30/2024 0830                         Point: Body mechanics (In Progress)       Description:   Instruct learner(s) on proper positioning and spine alignment during self-care, functional mobility activities and/or exercises.                  Learning Progress Summary             Patient Acceptance, E, NR by LR at 5/30/2024 0830                                         User Key       Initials Effective Dates Name Provider Type Discipline     03/21/24 -  Guerline Wright, OT Student OT Student OT                  OT Recommendation and Plan  Therapy Frequency (OT): (P) daily  Plan of Care Review  Plan of Care Reviewed With: (P) patient  Progress: (P) no change (OT initial eval)  Outcome Evaluation: (P) Pt presents below baseline with impaired coordination to perform functional tasks, impaired balance, impulsivity, and impaired safety precaution awareness. Donned socks with set up  A and increased time for processing. Performed grooming tasks sink side with CGA. Pt demo'd instability veering to the L during ADL mobility. Reaching for external support, but declined AD. Recommend IRF at D/C.     Time Calculation:   Evaluation Complexity (OT)  Review Occupational Profile/Medical/Therapy History Complexity: (P) brief/low complexity  Assessment, Occupational Performance/Identification of Deficit Complexity: (P) 1-3 performance deficits  Clinical Decision Making Complexity (OT): (P) problem focused assessment/low complexity  Overall Complexity of Evaluation (OT): (P) low complexity     Time Calculation- OT       Row Name 05/30/24 0957             Time Calculation- OT    OT Start Time 0830 (P)   -LR      OT Received On 05/30/24 (P)   -LR      OT Goal Re-Cert Due Date 06/09/24 (P)   -LR         Timed Charges    56474 - OT Therapeutic Activity Minutes 20 (P)   -LR      67801 - OT Self Care/Mgmt Minutes 13 (P)   -LR         Untimed Charges    OT Eval/Re-eval Minutes 47 (P)   -LR         Total Minutes    Timed Charges Total Minutes 33 (P)   -LR      Untimed Charges Total Minutes 47 (P)   -LR       Total Minutes 80 (P)   -LR                User Key  (r) = Recorded By, (t) = Taken By, (c) = Cosigned By      Initials Name Provider Type    LR Guerline Wright, OT Student OT Student                  Therapy Charges for Today       Code Description Service Date Service Provider Modifiers Qty    09923388923 HC OT THERAPEUTIC ACT EA 15 MIN 5/30/2024 Guerline Wright OT Student GO 1    24861031790 HC OT SELF CARE/MGMT/TRAIN EA 15 MIN 5/30/2024 Guerline Wright OT Student GO 1    24294374092 HC OT EVAL LOW COMPLEXITY 4 5/30/2024 Guerline Wright OT Student GO 1                 Guerline Wright OT Student  5/30/2024

## 2024-05-30 NOTE — CONSULTS
[facility] Neurology  Consult Note    Patient Name: Terrence James  : 1975  MRN: [id number]  Primary Care Physician:  Pino Borrero MD  Referring Physician: No ref. provider found  Date of admission: 2024    Subjective     Reason for Consult: AMS with ataxia, concern for Warnicke's    Terrence James is a 48 y.o. male with history of alcohol use, pancreatitis, hypertension who is presenting with encephalopathy.    Patient presented to [facility] ED on  with altered mental status and difficulty walking.  He was recently admitted to [facility] for alcoholic pancreatitis/hepatitis.  He was discharged home on oral Serax taper (he has taken for last 3 days) and thiamine.  He has not had any alcohol in 10 days.  Wife brought him in due to increasing confusion, somnolence and difficulty walking.    He also reports urinary urgency and some incontinence.    Review Of Systems   Constitutional:   Cardiovascular: Negative for chest pain or palpitations.  Respiratory: Negative for shortness of breath or cough.  Gastrointestinal: Negative for nausea and vomiting.  Genitourinary: Positive for bladder incontinence.  Musculoskeletal: Negative for aches and pains in the muscles or joints.  Dermatological: Negative for skin breakdown.   Neurological: Negative for headache, pain, weakness or vision changes.     Personal History     Past Medical History:   Diagnosis Date    Anxiety     COVID-19 2021    DVT (deep venous thrombosis)     LEFT LEG     Hypertension     Wears reading eyeglasses        Past Surgical History:   Procedure Laterality Date    CARDIAC CATHETERIZATION  2019    CORONARY STENT PLACEMENT  2019    EAR TUBES Bilateral     FEMORAL THROMBECTOMY/EMBOLECTOMY Left 10/20/2021    Procedure: ULTRASOUND GUIDED ACCESS; LEFT LEG VENOGRAM; INTRAVENOUS ULTRASOUND OF FEMORAL VEIN, POPLITEAL VEIN, EXTERNAL AND COMMON ILIAC VEINS; LEFT POPLITEAL VEIN ANGIOPLASTY; LEFT COMMON ILIAC VEIN ANGIOPLASTY;  POPLITEAL AND FEMORAL VEIN THROMBECTOMY - LEFT;  Surgeon: Arnel Gordon MD;  Location: [facility];  Service: Vascular;  Laterality: Left;  FLUORO:3 MIN 48 SEC  DOSE: 45 MGY  CONTRAS    GASTRIC SLEEVE LAPAROSCOPIC  03/2014    LUMBAR DISCECTOMY  2006    L3-4    VASECTOMY         Family History: family history includes Diabetes in his mother; Heart attack in his father; Kidney disease in his mother; No Known Problems in his brother, daughter, sister, sister, and son; Other in his maternal grandfather, maternal grandmother, paternal grandfather, and paternal grandmother; Skin cancer in his mother; Vision loss in his mother. Otherwise pertinent FHx was reviewed and not pertinent to current issue.    Social History:  reports that he quit smoking about 3 years ago. His smoking use included cigarettes. He started smoking about 25 years ago. He has a 5.5 pack-year smoking history. He has never used smokeless tobacco. He reports current alcohol use. He reports that he does not use drugs.    Home Medications:   amLODIPine-valsartan, buPROPion XL, busPIRone, hydrOXYzine, metoprolol tartrate, omeprazole, oxazepam, rosuvastatin, and thiamine    Current Medications:     Current Facility-Administered Medications:     acetaminophen (TYLENOL) tablet 650 mg, 650 mg, Oral, Q4H PRN, Osiris Gallagher DO, 650 mg at 05/30/24 0356    amLODIPine (NORVASC) tablet 10 mg, 10 mg, Oral, Q24H, 10 mg at 05/30/24 0922 **AND** valsartan (DIOVAN) tablet 160 mg, 160 mg, Oral, Q24H, Osiris Gallagher DO, 160 mg at 05/30/24 0922    sennosides-docusate (PERICOLACE) 8.6-50 MG per tablet 2 tablet, 2 tablet, Oral, BID PRN **AND** polyethylene glycol (MIRALAX) packet 17 g, 17 g, Oral, Daily PRN **AND** bisacodyl (DULCOLAX) EC tablet 5 mg, 5 mg, Oral, Daily PRN **AND** bisacodyl (DULCOLAX) suppository 10 mg, 10 mg, Rectal, Daily PRN, Osiris Gallagher DO    buPROPion XL (WELLBUTRIN XL) 24 hr tablet 300 mg, 300 mg, Oral, Daily, Osiris Gallagher DO,  300 mg at 05/30/24 0924    busPIRone (BUSPAR) tablet 15 mg, 15 mg, Oral, BID, Osiris Gallagher, , 15 mg at 05/30/24 0923    Calcium Replacement - Follow Nurse / BPA Driven Protocol, , Does not apply, PRN, Osiris Gallagher,     dextrose (D50W) (25 g/50 mL) IV injection 25 g, 25 g, Intravenous, Q15 Min PRN, Osiris Gallagher DO    dextrose (GLUTOSE) oral gel 15 g, 15 g, Oral, Q15 Min PRN, Osiris Gallagher,     glucagon (GLUCAGEN) injection 1 mg, 1 mg, Intramuscular, Q15 Min PRN, Osiris Gallagher DO    Insulin Lispro (humaLOG) injection 2-9 Units, 2-9 Units, Subcutaneous, 4x Daily AC & at Bedtime, Osiris Gallagher DO, 4 Units at 05/30/24 0921    Magnesium Standard Dose Replacement - Follow Nurse / BPA Driven Protocol, , Does not apply, PRN, Osiris Gallagher DO    metoprolol tartrate (LOPRESSOR) tablet 50 mg, 50 mg, Oral, BID, Osiris Gallagher DO, 50 mg at 05/30/24 0922    ondansetron (ZOFRAN) injection 4 mg, 4 mg, Intravenous, Q6H PRN, Osiris Gallagher DO, 4 mg at 05/29/24 2017    pantoprazole (PROTONIX) EC tablet 40 mg, 40 mg, Oral, Q AM, Osiris Gallagher DO, 40 mg at 05/30/24 0530    Phosphorus Replacement - Follow Nurse / BPA Driven Protocol, , Does not apply, PRN, Osiris Gallagher,     Potassium Replacement - Follow Nurse / BPA Driven Protocol, , Does not apply, PRN, Osiris Gallahger DO    rosuvastatin (CRESTOR) tablet 5 mg, 5 mg, Oral, Nightly, Osiris Gallagher DO, 5 mg at 05/29/24 2144    Sodium Chloride (PF) 0.9 % 10 mL, 10 mL, Intravenous, PRN, Osiris Gallagher,     sodium chloride 0.9 % flush 10 mL, 10 mL, Intravenous, Q12H, Osiris Gallagher DO, 10 mL at 05/30/24 0921    sodium chloride 0.9 % flush 10 mL, 10 mL, Intravenous, PRN, Osiris Gallagher,     sodium chloride 0.9 % infusion 40 mL, 40 mL, Intravenous, PRN, Osiris Gallagher DO    [START ON 6/1/2024] thiamine (B-1) 250 mg in sodium chloride 0.9 % 100 mL IVPB, 250 mg, Intravenous, Daily, Osiris Gallagher, DO     thiamine (B-1) 500 mg in sodium chloride 0.9 % 100 mL IVPB, 500 mg, Intravenous, TID, Osiris Gallagher R, DO, Last Rate: 200 mL/hr at 05/30/24 0929, 500 mg at 05/30/24 0929     Allergies:  No Known Allergies    Objective     Vitals:  Temp:  [98 °F (36.7 °C)-99 °F (37.2 °C)] 98 °F (36.7 °C)  Heart Rate:  [65-92] (P) 82  Resp:  [16-18] 18  BP: (131-156)/() (P) 146/101  Flow (L/min):  [1-2] 2    Neurologic Exam   Mental status/Cognition: alert; oriented to person, place, year, and month.  Able to state days of the week backwards  Speech/language: fluent; comprehension intact    Cranial nerves: PERRL. EOMI. Face appears symmetric. No dysarthria.  Shoulder shrug symmetric.  Tongue protrudes midline    Motor: No drift in any extremities, able to raise all to antigravity.    Sensation: intact to light touch throughout    Reflexes: 2+ at bilateral biceps, brachioradialis, patellas, achilles. Toes down.     Coordination/Complex Motor: Finger-to-nose and heel to shin intact bilaterally without dysmetria        Laboratory Results:   Lab Results   Component Value Date    GLUCOSE 256 (H) 05/30/2024    CALCIUM 8.1 (L) 05/30/2024     05/30/2024    K 3.2 (L) 05/30/2024    CO2 29.0 05/30/2024    CL 99 05/30/2024    BUN 10 05/30/2024    CREATININE 0.94 05/30/2024    EGFRIFAFRI 89 10/18/2021    EGFRIFNONA 73 10/18/2021    BCR 10.6 05/30/2024    ANIONGAP 10.0 05/30/2024     Lab Results   Component Value Date    WBC 10.06 05/30/2024    HGB 10.8 (L) 05/30/2024    HCT 33.2 (L) 05/30/2024    MCV 83.0 05/30/2024     05/30/2024     Lab Results   Component Value Date    CHOL 203 (H) 08/03/2023     Lab Results   Component Value Date    HDL 39 (L) 08/03/2023     Lab Results   Component Value Date     (H) 08/03/2023     Lab Results   Component Value Date    TRIG 370 (H) 08/03/2023     Lab Results   Component Value Date    HGBA1C 7.00 (H) 11/08/2023     Lab Results   Component Value Date    QTUGVKNK58 1,707 (H) 05/29/2024      Lab Results   Component Value Date    FOLATE 10.40 05/29/2024     Ammonia 13    UDS + benzodiazepine  Images/Procedures   CT head 5/29/2024  Unremarkable     MRI brain with and without contrast 5/29/2024  No acute findings.  Mild periventricular T2/FLAIR hyperintensities likely representing chronic small vessel disease.  No abnormal enhancement    Assessment / Plan   Active Hospital Problems:  Encephalopathy likely toxic metabolic  Alcohol use disorder  Oversedation due to medication     Brief Patient Summary:  Terrence James is a 48 y.o. male with history of alcohol use, pancreatitis, hypertension who is presenting with encephalopathy.  He was recently admitted to [facility] for pancreatitis/hepatitis and was discharged home for 1 day when he returned to [facility] ED with increased confusion, somnolence, gait disturbance.  CT head was unremarkable.  MRI brain with and without contrast showed no acute findings, he does have some mild periventricular FLAIR hyperintensities likely representing chronic small vessel disease, there is no abnormal enhancement.  He has a normal B12 and folate.  Ammonia 13.  UDS was positive for benzodiazepines, which is consistent with his prescription of Serax.     On exam today, patient is alert, oriented x 3, able to state days of the week backwards.  He repeatedly states he did not have pancreatitis while at [facility] even though this is documented in his chart.  No other signs of confabulation.  Moves all extremities symmetrically to antigravity, no dysmetria noted on finger-to-nose, heel-to-shin.  He has a normal gait with normal arm swing and gait width.  He is able to walk unassisted.     Wernicke's encephalopathy is possible, and he is since been treated with high-dose thiamine, but he has had dramatic recovery in the last 24 hours.  Suspect there may be multiple factors, recent admission with pancreatitis/hepatitis, alcohol withdrawal, pain medications, starting Serax.  Suspect  Serax is contributing to his somnolence and slowed speech.  Would recommend holding this tonight and monitor for improvement.       Plan:   -Agree with holding Serax  -Can trial melatonin 5 mg nightly to help with sleep  -Continue bupropion  mg daily  -Continue hydroxyzine as needed  -Agree with high-dose thiamine    I have discussed the above with the patient, family, bedside RN, hospitalist  Time spent with patient: 45 minutes in face-to-face evaluation and management of the patient.       Michael Singleton, DO

## 2024-05-31 ENCOUNTER — READMISSION MANAGEMENT (OUTPATIENT)
Dept: CALL CENTER | Facility: HOSPITAL | Age: 49
End: 2024-05-31
Payer: COMMERCIAL

## 2024-05-31 VITALS
HEIGHT: 71 IN | SYSTOLIC BLOOD PRESSURE: 135 MMHG | BODY MASS INDEX: 31.5 KG/M2 | TEMPERATURE: 97.8 F | WEIGHT: 225 LBS | OXYGEN SATURATION: 91 % | RESPIRATION RATE: 16 BRPM | HEART RATE: 67 BPM | DIASTOLIC BLOOD PRESSURE: 94 MMHG

## 2024-05-31 LAB
ALBUMIN SERPL-MCNC: 2.9 G/DL (ref 3.5–5.2)
ALBUMIN/GLOB SERPL: 0.7 G/DL
ALP SERPL-CCNC: 541 U/L (ref 39–117)
ALT SERPL W P-5'-P-CCNC: 63 U/L (ref 1–41)
ANION GAP SERPL CALCULATED.3IONS-SCNC: 11 MMOL/L (ref 5–15)
AST SERPL-CCNC: 79 U/L (ref 1–40)
BASOPHILS # BLD AUTO: 0.03 10*3/MM3 (ref 0–0.2)
BASOPHILS NFR BLD AUTO: 0.2 % (ref 0–1.5)
BILIRUB SERPL-MCNC: 3 MG/DL (ref 0–1.2)
BUN SERPL-MCNC: 8 MG/DL (ref 6–20)
BUN/CREAT SERPL: 8.4 (ref 7–25)
CALCIUM SPEC-SCNC: 8.1 MG/DL (ref 8.6–10.5)
CHLORIDE SERPL-SCNC: 98 MMOL/L (ref 98–107)
CO2 SERPL-SCNC: 28 MMOL/L (ref 22–29)
CREAT SERPL-MCNC: 0.95 MG/DL (ref 0.76–1.27)
DEPRECATED RDW RBC AUTO: 56.1 FL (ref 37–54)
EGFRCR SERPLBLD CKD-EPI 2021: 98.1 ML/MIN/1.73
EOSINOPHIL # BLD AUTO: 0.09 10*3/MM3 (ref 0–0.4)
EOSINOPHIL NFR BLD AUTO: 0.7 % (ref 0.3–6.2)
ERYTHROCYTE [DISTWIDTH] IN BLOOD BY AUTOMATED COUNT: 19.8 % (ref 12.3–15.4)
GLOBULIN UR ELPH-MCNC: 4 GM/DL
GLUCOSE BLDC GLUCOMTR-MCNC: 156 MG/DL (ref 70–130)
GLUCOSE BLDC GLUCOMTR-MCNC: 188 MG/DL (ref 70–130)
GLUCOSE SERPL-MCNC: 142 MG/DL (ref 65–99)
HCT VFR BLD AUTO: 32.5 % (ref 37.5–51)
HGB BLD-MCNC: 10.7 G/DL (ref 13–17.7)
IMM GRANULOCYTES # BLD AUTO: 0.11 10*3/MM3 (ref 0–0.05)
IMM GRANULOCYTES NFR BLD AUTO: 0.9 % (ref 0–0.5)
LYMPHOCYTES # BLD AUTO: 1.66 10*3/MM3 (ref 0.7–3.1)
LYMPHOCYTES NFR BLD AUTO: 13.4 % (ref 19.6–45.3)
MCH RBC QN AUTO: 28 PG (ref 26.6–33)
MCHC RBC AUTO-ENTMCNC: 32.9 G/DL (ref 31.5–35.7)
MCV RBC AUTO: 85.1 FL (ref 79–97)
MONOCYTES # BLD AUTO: 0.71 10*3/MM3 (ref 0.1–0.9)
MONOCYTES NFR BLD AUTO: 5.7 % (ref 5–12)
NEUTROPHILS NFR BLD AUTO: 79.1 % (ref 42.7–76)
NEUTROPHILS NFR BLD AUTO: 9.77 10*3/MM3 (ref 1.7–7)
NRBC BLD AUTO-RTO: 0.3 /100 WBC (ref 0–0.2)
PLATELET # BLD AUTO: 363 10*3/MM3 (ref 140–450)
PMV BLD AUTO: 10.8 FL (ref 6–12)
POTASSIUM SERPL-SCNC: 3.4 MMOL/L (ref 3.5–5.2)
POTASSIUM SERPL-SCNC: 3.4 MMOL/L (ref 3.5–5.2)
POTASSIUM SERPL-SCNC: 4.2 MMOL/L (ref 3.5–5.2)
PROT SERPL-MCNC: 6.9 G/DL (ref 6–8.5)
RBC # BLD AUTO: 3.82 10*6/MM3 (ref 4.14–5.8)
SODIUM SERPL-SCNC: 137 MMOL/L (ref 136–145)
WBC NRBC COR # BLD AUTO: 12.37 10*3/MM3 (ref 3.4–10.8)

## 2024-05-31 PROCEDURE — 80053 COMPREHEN METABOLIC PANEL: CPT | Performed by: INTERNAL MEDICINE

## 2024-05-31 PROCEDURE — 25010000002 THIAMINE PER 100 MG: Performed by: INTERNAL MEDICINE

## 2024-05-31 PROCEDURE — 85025 COMPLETE CBC W/AUTO DIFF WBC: CPT | Performed by: INTERNAL MEDICINE

## 2024-05-31 PROCEDURE — 84132 ASSAY OF SERUM POTASSIUM: CPT | Performed by: INTERNAL MEDICINE

## 2024-05-31 PROCEDURE — 63710000001 INSULIN LISPRO (HUMAN) PER 5 UNITS: Performed by: INTERNAL MEDICINE

## 2024-05-31 PROCEDURE — 99239 HOSP IP/OBS DSCHRG MGMT >30: CPT | Performed by: INTERNAL MEDICINE

## 2024-05-31 PROCEDURE — 82948 REAGENT STRIP/BLOOD GLUCOSE: CPT

## 2024-05-31 PROCEDURE — 99233 SBSQ HOSP IP/OBS HIGH 50: CPT | Performed by: STUDENT IN AN ORGANIZED HEALTH CARE EDUCATION/TRAINING PROGRAM

## 2024-05-31 RX ORDER — POTASSIUM CHLORIDE 20 MEQ/1
40 TABLET, EXTENDED RELEASE ORAL EVERY 4 HOURS
Status: COMPLETED | OUTPATIENT
Start: 2024-05-31 | End: 2024-05-31

## 2024-05-31 RX ORDER — LANCETS 28 GAUGE
1 EACH MISCELLANEOUS AS NEEDED
Qty: 100 EACH | Refills: 0 | Status: SHIPPED | OUTPATIENT
Start: 2024-05-31

## 2024-05-31 RX ORDER — BLOOD-GLUCOSE METER
1 KIT MISCELLANEOUS AS NEEDED
Qty: 100 EACH | Refills: 0 | Status: SHIPPED | OUTPATIENT
Start: 2024-05-31

## 2024-05-31 RX ORDER — HYDROXYZINE HYDROCHLORIDE 25 MG/1
50 TABLET, FILM COATED ORAL EVERY 6 HOURS PRN
Start: 2024-05-31

## 2024-05-31 RX ORDER — BLOOD-GLUCOSE METER
1 KIT MISCELLANEOUS DAILY
Qty: 1 EACH | Refills: 0 | Status: SHIPPED | OUTPATIENT
Start: 2024-05-31

## 2024-05-31 RX ADMIN — VALSARTAN 160 MG: 160 TABLET, FILM COATED ORAL at 09:28

## 2024-05-31 RX ADMIN — Medication 10 ML: at 09:33

## 2024-05-31 RX ADMIN — BUSPIRONE HYDROCHLORIDE 15 MG: 10 TABLET ORAL at 09:29

## 2024-05-31 RX ADMIN — BUPROPION HYDROCHLORIDE 300 MG: 150 TABLET, EXTENDED RELEASE ORAL at 09:30

## 2024-05-31 RX ADMIN — METOPROLOL TARTRATE 50 MG: 50 TABLET, FILM COATED ORAL at 09:30

## 2024-05-31 RX ADMIN — AMLODIPINE BESYLATE 10 MG: 10 TABLET ORAL at 09:31

## 2024-05-31 RX ADMIN — THIAMINE HYDROCHLORIDE 500 MG: 100 INJECTION, SOLUTION INTRAMUSCULAR; INTRAVENOUS at 09:39

## 2024-05-31 RX ADMIN — POTASSIUM CHLORIDE 40 MEQ: 1500 TABLET, EXTENDED RELEASE ORAL at 04:17

## 2024-05-31 RX ADMIN — INSULIN LISPRO 2 UNITS: 100 INJECTION, SOLUTION INTRAVENOUS; SUBCUTANEOUS at 09:31

## 2024-05-31 RX ADMIN — INSULIN LISPRO 2 UNITS: 100 INJECTION, SOLUTION INTRAVENOUS; SUBCUTANEOUS at 11:48

## 2024-05-31 RX ADMIN — PANTOPRAZOLE SODIUM 40 MG: 40 TABLET, DELAYED RELEASE ORAL at 06:36

## 2024-05-31 RX ADMIN — POTASSIUM CHLORIDE 40 MEQ: 1500 TABLET, EXTENDED RELEASE ORAL at 09:29

## 2024-05-31 NOTE — PROGRESS NOTES
[facility] Neurology    Progress Note    Patient Name: Terrence James  : 1975  MRN: [id number]  Primary Care Physician:  Pino Borrero MD  Date of admission: 2024    Subjective     Reason for consult: AMS with ataxia, concern for Warnicke's     History of Present Illness   Patient asleep, wife at bedside states that he was mildly confused this morning particularly about his stay at [facility], but overall seems greatly improved since admission.    Review of Systems   Unable to obtain    Objective     Vitals:   Temp:  [97.9 °F (36.6 °C)-98.2 °F (36.8 °C)] 98.1 °F (36.7 °C)  Heart Rate:  [9-77] 72  Resp:  [16-18] 16  BP: (129-153)/() 139/98  Flow (L/min):  [2] 2    Neurologic Exam   Mental status/Cognition: Asleep, did not wake        Current Medications    Current Facility-Administered Medications:     acetaminophen (TYLENOL) tablet 650 mg, 650 mg, Oral, Q4H PRN, Osiris Gallagher, DO, 650 mg at 24 0356    amLODIPine (NORVASC) tablet 10 mg, 10 mg, Oral, Q24H, 10 mg at 24 0931 **AND** valsartan (DIOVAN) tablet 160 mg, 160 mg, Oral, Q24H, Osiris Gallagher, DO, 160 mg at 24 0928    sennosides-docusate (PERICOLACE) 8.6-50 MG per tablet 2 tablet, 2 tablet, Oral, BID PRN, 2 tablet at 24 1110 **AND** polyethylene glycol (MIRALAX) packet 17 g, 17 g, Oral, Daily PRN, 17 g at 24 1110 **AND** bisacodyl (DULCOLAX) EC tablet 5 mg, 5 mg, Oral, Daily PRN, 5 mg at 24 1110 **AND** bisacodyl (DULCOLAX) suppository 10 mg, 10 mg, Rectal, Daily PRN, Osiris Gallagher DO    buPROPion XL (WELLBUTRIN XL) 24 hr tablet 300 mg, 300 mg, Oral, Daily, Osiris Gallagher DO, 300 mg at 24 0930    busPIRone (BUSPAR) tablet 15 mg, 15 mg, Oral, BID, Osiris Gallagher DO, 15 mg at 24 0929    Calcium Replacement - Follow Nurse / BPA Driven Protocol, , Does not apply, PRN, Osiris Gallagher DO    dextrose (D50W) (25 g/50 mL) IV injection 25 g, 25 g, Intravenous, Q15 Min  PRN, Osiris Gallagher, DO    dextrose (GLUTOSE) oral gel 15 g, 15 g, Oral, Q15 Min PRN, Osiris Gallagher, DO    glucagon (GLUCAGEN) injection 1 mg, 1 mg, Intramuscular, Q15 Min PRN, Osiris Gallagher, DO    hydrOXYzine (ATARAX) tablet 50 mg, 50 mg, Oral, 4x Daily PRN, Osiris Gallagher, DO, 50 mg at 05/30/24 2253    insulin glargine (LANTUS, SEMGLEE) injection 5 Units, 5 Units, Subcutaneous, Nightly, Osiris Gallagher, DO, 5 Units at 05/30/24 2014    Insulin Lispro (humaLOG) injection 2-9 Units, 2-9 Units, Subcutaneous, 4x Daily AC & at Bedtime, Osiris Gallagher DO, 2 Units at 05/31/24 0931    Magnesium Standard Dose Replacement - Follow Nurse / BPA Driven Protocol, , Does not apply, PRN, Osiris Gallagher DO    melatonin tablet 5 mg, 5 mg, Oral, Nightly, Michael Singleton, DO, 5 mg at 05/30/24 2013    metoprolol tartrate (LOPRESSOR) tablet 50 mg, 50 mg, Oral, BID, Osiris Gallagher DO, 50 mg at 05/31/24 0930    ondansetron (ZOFRAN) injection 4 mg, 4 mg, Intravenous, Q6H PRN, Osiris Gallagher, DO, 4 mg at 05/30/24 2121    pantoprazole (PROTONIX) EC tablet 40 mg, 40 mg, Oral, Q AM, Osiris Gallagher, DO, 40 mg at 05/31/24 0636    Phosphorus Replacement - Follow Nurse / BPA Driven Protocol, , Does not apply, PRN, Osiris Gallagher, DO    Potassium Replacement - Follow Nurse / BPA Driven Protocol, , Does not apply, PRN, Osiris Gallagher, DO    rosuvastatin (CRESTOR) tablet 5 mg, 5 mg, Oral, Nightly, Osiris Gallagher, DO, 5 mg at 05/30/24 2014    Sodium Chloride (PF) 0.9 % 10 mL, 10 mL, Intravenous, PRN, Osiris Gallagher, DO    sodium chloride 0.9 % flush 10 mL, 10 mL, Intravenous, Q12H, Osiris Gallagher, , 10 mL at 05/31/24 0933    sodium chloride 0.9 % flush 10 mL, 10 mL, Intravenous, PRN, Osiris Gallagher,     sodium chloride 0.9 % infusion 40 mL, 40 mL, Intravenous, PRN, Osiris Gallagher,     [START ON 6/1/2024] thiamine (B-1) 250 mg in sodium chloride 0.9 % 100 mL IVPB, 250 mg, Intravenous, Daily,  Osiris Gallagher,     thiamine (B-1) 500 mg in sodium chloride 0.9 % 100 mL IVPB, 500 mg, Intravenous, TID, Osiris Gallagher, , Last Rate: 200 mL/hr at 05/31/24 0939, 500 mg at 05/31/24 0939    Laboratory Results:   Lab Results   Component Value Date    GLUCOSE 142 (H) 05/31/2024    CALCIUM 8.1 (L) 05/31/2024     05/31/2024    K 3.4 (L) 05/31/2024    K 3.4 (L) 05/31/2024    CO2 28.0 05/31/2024    CL 98 05/31/2024    BUN 8 05/31/2024    CREATININE 0.95 05/31/2024    EGFRIFAFRI 89 10/18/2021    EGFRIFNONA 73 10/18/2021    BCR 8.4 05/31/2024    ANIONGAP 11.0 05/31/2024     Lab Results   Component Value Date    WBC 12.37 (H) 05/31/2024    HGB 10.7 (L) 05/31/2024    HCT 32.5 (L) 05/31/2024    MCV 85.1 05/31/2024     05/31/2024     Lab Results   Component Value Date    CHOL 203 (H) 08/03/2023     Lab Results   Component Value Date    HDL 39 (L) 08/03/2023     Lab Results   Component Value Date     (H) 08/03/2023     Lab Results   Component Value Date    TRIG 370 (H) 08/03/2023     Lab Results   Component Value Date    HGBA1C 7.50 (H) 05/30/2024     Lab Results   Component Value Date    FOLATE 10.40 05/29/2024     Lab Results   Component Value Date    [id number] 1,707 (H) 05/29/2024     Ammonia 13     UDS + benzodiazepine    Images/Procedures   CT head 5/29/2024  Unremarkable      MRI brain with and without contrast 5/29/2024  No acute findings.  Mild periventricular T2/FLAIR hyperintensities likely representing chronic small vessel disease.  No abnormal enhancement    Assessment / Plan   Active Hospital Problems:  Encephalopathy likely toxic metabolic  Alcohol use disorder  Oversedation due to medication    Brief Patient Summary:  Terrence James is a 48 y.o. male with history of  alcohol use, pancreatitis, hypertension who is presenting with encephalopathy.  He was recently admitted to [facility] for pancreatitis/hepatitis and was discharged home for 1 day when he returned to [facility] ED with  increased confusion, somnolence, gait disturbance.  CT head was unremarkable.  MRI brain with and without contrast showed no acute findings, he does have some mild periventricular FLAIR hyperintensities likely representing chronic small vessel disease, there is no abnormal enhancement.  He has a normal B12 and folate.  Ammonia 13.  UDS was positive for benzodiazepines, which is consistent with his prescription of Serax.     Encephalopathy has improved since stopping Serax.  Suspect Serax is likely the cause of his encephalopathy has he started taking it 2 days prior to increased confusion, this along with his hospitalization for pancreatitis, hepatitis, narcotics use and recent withdrawal.  His ongoing confusion could possibly be due to Warnicke's, which she has been treated with high-dose thiamine, or a general cognitive decline from his prolonged alcohol use.  His anxiety may be contributing to his confusion, but this would be a diagnosis of exclusion.  Would also recommend psych follow-up given his anxiety related to sleep.    Patient is okay to discharge home.  Fishawnae at bedside states he has a PCP appointment on Tuesday.  Should he have ongoing confusion would recommend outpatient neurology follow-up for cognitive evaluation.  He may benefit from neuropsych testing.    Plan:   -Patient is okay to discharge  -Would not resume Serax after discharge  -Continue folate 1 mg daily, thiamine 100 mg daily  -Follow-up with PCP on Tuesday, to determine if neurology outpatient would be needed, recommended follow-up with SHERRIE Sousa if cognitive evaluation is needed      I have discussed the above with the patient, family, hospitalist  Time spent with patient: 35 minutes in face-to-face evaluation and management of the patient.      Michael Singleton, , MS

## 2024-05-31 NOTE — DISCHARGE SUMMARY
[facility] Medicine Services  DISCHARGE SUMMARY    Patient Name: Terrence James  : 1975  MRN: [id number]    Date of Admission: 2024 12:19 PM  Date of Discharge:  2024  Primary Care Physician: Pino Borrero MD    Consults       Date and Time Order Name Status Description    2024  6:00 PM Inpatient Neurology Consult General Completed             Hospital Course     Presenting Problem: AMS    Active Hospital Problems    Diagnosis  POA    **Ataxia [R27.0]  Yes    Hyperlipidemia LDL goal <70 [E78.5]  Yes    Uncomplicated alcohol dependence [F10.20]  Yes    Type 2 diabetes mellitus with hyperglycemia, without long-term current use of insulin [E11.65]  Yes    Gastroesophageal reflux disease [K21.9]  Yes    Hypertension [I10]  Yes    Coronary artery disease involving native coronary artery of native heart without angina pectoris [I25.10]  Yes    Anxiety and depression [F41.9, F32.A]  Yes      Resolved Hospital Problems   No resolved problems to display.          Hospital Course:  Terrence James is a 48 y.o. male with PMHx significant for alcohol abuse (last drink 24), HTN, anxiety/depression, DMII with recent admission to [facility] for alcoholic pancreatitis and alcohol withdrawal discharged  who presents to [facility] ED with increasing confusion, lethargy and ataxia w/some slurred speech.     AMS/Encephalopathy w/Ataxia - resolved  Lethargy/somnolence - resolved  - etiology likely multifactorial, Wernicke's in the differential given his ETOH use and gastric sleeve, he was treated with high dose IV thiamine although given his rapid improvement less likely cause, continue oral thiamine at discharge.  - suspect majority of his presentation related to benzodiazepine/oral serax as he significantly improved after being off this medication for 24 hrs.  - B12/folate level are WNL, ammonia is WNL  - no clear infectious etiology   - CT head negative, MRI brain negative for  stroke  - Neurology consulted and agreed w/plan. If he continues to have some confusion at f/u with PCP, recommend neurology referral for cognitive evaluation.  - PT/OT evaluations recommended therapy at discharge which patient prefers to do outpatient at this time.     Recent Alcoholic Pancreatitis  Alcoholic Hepatitis  Transaminitis w/Elevated bili   Hepatic Steatosis  - treated at [facility], discharge 5/28  - LFTs reviewed, AST/ALT and T.bili overall improved from recent discharge, Alk phos remains elevated, MRCP 5/26 c/w pancreatitis with cystic lesion likely an IPMN, no evidence for obstruction at that time with no plans for ERCP. GI at [facility] recommended repeat MRCP in 6-8 weeks outpatient. Patient/fiance aware.   -repeat CMP at /u with PCP in 1 week     ETOH Abuse:  - reports drinking 1/2 of a 5th of vodka daily, has not had a drink since 5/19  - completed inpatient treatment for withdrawal at [facility] w/[facility], discharged on oral serax, which will be discontinued at discharge  - continue PRN hydroxyzine for anxiety  - chemical dependency consulted, patient declined AUD treatment, felt he could maintain his sobriety on his own.     Urinary Incontinence  - likely secondary to benzo use, now resolved. UA unremarkable, PVR WNL     DMII:  - A1c 7.5 , resume metformin, increase to BID, discussed diet and exercise   - prescribed blood glucose monitoring kit   - f/u with PCP for further discussion regarding DM management     HTN:  - continue home norvasc/losartan    HLD:  - continue statin     Anxiety/Depression:  - continue home buspar, wellbutrin  - PRN Hydroxyzine     Discharge Follow Up Recommendations for outpatient labs/diagnostics:  - f/u with PCP in one week with CMP, DM f/u   - if continues to have confusion recommend neurology referral for cognitive eval  - f/u with GI as previously scheduled for repeat MRCP in 6-8 weeks    Day of Discharge     HPI:   Patient sitting up in bed. We had a long discussion about  cessation of alcohol at discharge. He voiced understanding and says he has a plan and can do it by himself. Did not feel that he needed to enroll in AUD treatment at this time. Urinary symptoms resolved overnight. We discussed no further serax use and no further benzos as they cause significant confusion.    Review of Systems  Gen- No fevers, chills  CV- No chest pain, palpitations  Resp- No cough, dyspnea  GI- No N/V/D, abd pain    Vital Signs:   Temp:  [97.9 °F (36.6 °C)-98.2 °F (36.8 °C)] 98.1 °F (36.7 °C)  Heart Rate:  [9-77] 72  Resp:  [16-18] 16  BP: (129-153)/() 139/98  Flow (L/min):  [2] 2      Physical Exam:  Constitutional: No acute distress, awake, alert  HENT: NCAT, mucous membranes moist  Respiratory: Clear to auscultation bilaterally, respiratory effort normal   Cardiovascular: RRR, no murmurs, rubs, or gallops  Gastrointestinal: soft, nontender, nondistended  Musculoskeletal: No bilateral ankle edema  Psychiatric: Appropriate affect, cooperative  Neurologic: Oriented x 3, speech clear, no focal deficits  Skin: No rashes      Pertinent  and/or Most Recent Results     LAB RESULTS:      Lab 05/31/24 0117 05/30/24 0341 05/29/24  1139 05/25/24  1233   WBC 12.37* 10.06 8.58  --    HEMOGLOBIN 10.7* 10.8* 10.8*  --    HEMATOCRIT 32.5* 33.2* 33.0*  --    PLATELETS 363 368 322  --    NEUTROS ABS 9.77* 7.47* 6.10  --    IMMATURE GRANS (ABS) 0.11* 0.17* 0.18*  --    LYMPHS ABS 1.66 1.52 1.40  --    MONOS ABS 0.71 0.81 0.82  --    EOS ABS 0.09 0.06 0.05  --    MCV 85.1 83.0 84.0  --    PROTIME  --   --  14.5 10.4   APTT  --   --   --  26.6         Lab 05/31/24  0117 05/30/24  1346 05/30/24  0341 05/29/24  1139 05/27/24  0444 05/25/24  6740   SODIUM 137  --  138 136  --   --    POTASSIUM 3.4*  3.4* 3.3* 3.2* 3.7  --   --    CHLORIDE 98  --  99 97*  --   --    CO2 28.0  --  29.0 29.0  --   --    ANION GAP 11.0  --  10.0 10.0  --   --    BUN 8  --  10 11  --   --    CREATININE 0.95  --  0.94 1.09  --   --     EGFR 98.1  --  99.4 83.2  --   --    GLUCOSE 142*  --  256* 237*  --   --    CALCIUM 8.1*  --  8.1* 8.2*  --   --    MAGNESIUM  --   --   --  2.2 2.3 1.7   HEMOGLOBIN A1C  --   --  7.50*  --   --   --    TSH  --   --   --  2.000  --   --          Lab 05/31/24  0117 05/30/24  0341 05/29/24  1139 05/25/24  0450   TOTAL PROTEIN 6.9 6.4 6.3  --    ALBUMIN 2.9* 2.9* 2.9*  --    GLOBULIN 4.0 3.5 3.4  --    ALT (SGPT) 63* 71* 66*  --    AST (SGOT) 79* 96* 77*  --    BILIRUBIN 3.0* 4.0* 4.0*  --    ALK PHOS 541* 568* 479*  --    LIPASE  --   --  46 43         Lab 05/29/24  1139 05/25/24  1233   PROTIME 14.5 10.4   INR 1.11 0.94             Lab 05/29/24  1139   FOLATE 10.40   VITAMIN B 12 1,707*         Brief Urine Lab Results  (Last result in the past 365 days)        Color   Clarity   Blood   Leuk Est   Nitrite   Protein   CREAT   Urine HCG        05/30/24 1626 Dark Yellow   Clear   Negative   Negative   Negative   Trace                 Microbiology Results (last 10 days)       ** No results found for the last 240 hours. **            MRI Brain With & Without Contrast    Result Date: 5/29/2024  MRI BRAIN W WO CONTRAST Date of Exam: 5/29/2024 8:33 PM EDT Indication: ams/gait ataxia.  Comparison: Same day head CT Technique:  Routine multiplanar/multisequence sequence images of the brain were obtained before and after the uneventful administration of Multihance. Findings: No abnormal restricted diffusion or evidence of an acute infarct. There is no evidence of hemorrhage. No mass effect, edema or midline shift. No abnormal intracranial enhancement. Mild periventricular T2/FLAIR hyperintensities, nonspecific but most likely represents chronic small vessel ischemic changes. No extra-axial fluid collection. The ventricles are normal in size and configuration. The visualized orbits are unremarkable. The visualized paranasal sinuses and mastoid air cells are clear. The visualized soft tissues are unremarkable. No acute osseous  abnormality.     Impression: No acute intracranial abnormality. No abnormal intracranial enhancement. Mild periventricular T2/FLAIR hyperintensities are nonspecific but most likely represents chronic small vessel ischemic changes. Electronically Signed: Adam Driver DO  5/29/2024 10:04 PM EDT  Workstation ID: [id number]    CT Head Without Contrast    Result Date: 5/29/2024  CT HEAD WO CONTRAST Date of Exam: 5/29/2024 12:10 PM EDT Indication: ams. Comparison: None available. Technique: Axial CT images were obtained of the head without contrast administration.  Automated exposure control and iterative construction methods were used. Findings: Gray-white differentiation is maintained and there is no evidence of intracranial hemorrhage, mass or mass effect. The ventricles are normal in size and configuration. The orbits are normal. The paranasal sinuses are clear. The calvarium is intact.     Impression: No acute intracranial abnormality. Electronically Signed: Doug Tavarez MD  5/29/2024 12:48 PM EDT  Workstation ID: [id number]    MR abdomen without IV contrast MRCP    Result Date: 5/26/2024  MRCP CLINICAL INDICATION: Abdominal pain. Pancreatitis. Hepatitis.. Jaundice status post gastric sleeve. PROCEDURE: Multiplanar, multisequence MRI images of the abdomen were performed. Heavily T2-weighted MRCP sequences were also performed. 3D Reconstruction images were also performed. This examination was performed without intravenous contrast. COMPARISON: CT abdomen pelvis dated 5/22/2024. FINDINGS: Evaluation is limited due to lack of intravenous contrast and respiratory motion artifact. Lower chest: Small right pleural effusion. Trace left pleural effusion. Small pericardial effusion. Bibasilar dependent lung atelectasis. Liver: There is diffuse homogeneous signal intensity of the hepatic parenchyma, associated signal drop in note of phase images suggestive of hepatic steatosis.. There is a T2 hyperintense 7 mm lesion in  the right hepatic dome with a fluid intensity, most likely benign finding such as cyst or hemangioma. No suspicious focal hepatic abnormality.   Gallbladder: No cholelithiasis. No gallbladder wall thickening or pericholecystic fluid. Bile ducts: No biliary ductal dilatation or choledocholithiasis. Pancreas and pancreatic ducts: The pancreas demonstrates heterogeneous signal intensity with moderate peripancreatic inflammatory fat stranding. Mildly prominent pancreatic duct measuring up to 4 mm (series 7, image 17). 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 The peripancreatic inflammatory fat stranding appears to be extending into the pancreaticoduodenal groove on the right and left anterior pararenal spaces and to the right paracolic gutter resulting in trace amount of perihepatic and perisplenic fluid. Adrenal glands: The adrenal glands are morphologically unremarkable without suspicious lesion. Kidneys and proximal ureters: No suspicious renal lesions. Nonspecific bilateral perinephric inflammatory fat stranding. No hydronephrosis. Spleen: The spleen is normal in size and signal intensity. Gastrointestinal system and mesentery: The visualized portions of the gastrointestinal system are unremarkable. No significant mesenteric inflammation. Postsurgical changes in the stomach from prior gastric sleeve. Small hiatal hernia. Lymph nodes: No pathologically enlarged abdominal lymph nodes are present. Vessels: The abdominal aorta is normal in caliber. The celiac trunk and superior mesenteric artery appear grossly patent. The superior mesenteric vein, splenic vein and main portal veins are grossly patent. The inferior vena cava is unremarkable. Peritoneum: No free intraperitoneal fluid. Body wall: Unremarkable. Bones: No suspicious osseous lesions.    Heterogeneous pancreatic parenchyma with  moderate peripancreatic inflammatory fat stranding, consistent with acute interstitial edematous pancreatitis. Mildly prominent pancreatic duct measuring up to 4 mm in diameter. There is a 18 mm cystic lesion in the pancreatic tail, most likely IPMN. No discrete lesions to suggest solid pancreatic masses identified, evaluation is significantly limited due to respiratory motion artifact and lack of intravenous contrast. Short-term posttreatment follow-up MR pancreatic mass protocol without and with contrast is recommended for further evaluation. There is inflammatory fat stranding extending into the duodenal pancreatic groove with associated secondary duodenitis and a small amount of free fluid in the perihepatic, perisplenic, and bilateral anterior pararenal spaces/paracolic gutters. No discrete organized fluid collections. Hepatic steatosis. No cholelithiasis or choledocholithiasis. Small right and trace left pleural effusions. Bilateral dependent lung atelectasis.    X-ray eye foreign body    Result Date: 5/25/2024  EYE, FOREIGN BODY SERIES. HISTORY: Orbital foreign body. COMPARISON: None FINDINGS: Three views an apparent tiny metallic fragment overlying the right orbit, only seen on the AP view. The image is suboptimal.    Probable metallic foreign body in the right orbit. Image quality is poor, repeat imaging or CT is recommended for confirmation. Images reviewed, interpreted, and dictated by Dr. Chris Teixeira. Transcribed by BETINA Bhatt (R).    US abdomen limited    Result Date: 5/24/2024  RIGHT UPPER QUADRANT ULTRASOUND. HISTORY: Rising bilirubin. PROCEDURE: Sonographic images of the right upper quadrant were obtained. COMPARISON: Ultrasound of the gallbladder of and CT the abdomen and pelvis of 5/22. FINDINGS: There is diffuse increased echogenicity of the liver consistent with fatty change. No focal liver lesion is identified. The gallbladder is unremarkable. The common bile duct is 11 mm in diameter.  There is no convincing intrahepatic biliary ductal dilation. The right kidney is unremarkable.    Extrahepatic biliary ductal dilation of uncertain etiology. MRCP would be helpful for further evaluation. Fatty liver.. Images reviewed, interpreted and dictated by Dr. Chris Teixeira MD    Ultrasound gallbladder    Result Date: 5/22/2024  GALLBLADDER ULTRASOUND INDICATION: Acute abdominal pain. TECHNIQUE: Limited sonographic images of the right upper quadrant, focusing on the gallbladder, were obtained. COMPARISON: CT of abdomen and pelvis on the same day, May 22, 2024. FINDINGS: The gallbladder is well filled.  There are no gallstones, pericholecystic fluid, or gallbladder wall thickening.  The common duct measures 5 mm.  This is within normal limits for age.  The visualized surrounding soft tissues demonstrate a fatty liver.      No gallstones, pericholecystic fluid, or gallbladder wall thickening.  Normal common duct. Fatty liver. Images reviewed, interpreted, and dictated by Dr. Tammy Lu. Transcribed by Suzan Barger PA-C.    CT Abdomen Pelvis With Contrast    Result Date: 5/22/2024  CT ABDOMEN AND PELVIS WITH IV CONTRAST HISTORY:  Right lower quadrant pain, nausea and vomiting for one day, history of gastric sleeve surgery. TECHNIQUE: Thin-section axial images from the lung bases through the symphysis pubis were performed with the administration of intravenous contrast. This study was performed with techniques to keep radiation doses as low as reasonably achievable, (ALARA). Individualized dose reduction techniques using automated exposure control or adjustment of mA and/or kV according to the patient size were employed. COMPARISON: None. FINDINGS: Significant coronary artery calcifications are noted. The lung bases are clear. There are no effusions. The patient is status post gastric sleeve. There is a small hiatal hernia. There are no fluid collections in the upper abdomen. There is no evidence of obstruction.  "There are significant inflammatory changes seen in the region of the pancreatic head and neck as well as upper abdomen. Findings suggest pancreatitis. There is diffuse fatty-infiltration of the liver. There is no focal abnormality. The gallbladder is mildly distended without stones. There is no ductal dilation. The spleen is unremarkable. There is no evidence of splenic infarct. The adrenal glands and kidneys are unremarkable. The portal venous system is patent. There are vascular calcifications of the aorta with no aneurysm. The urinary bladder is unremarkable. The GI tract demonstrates no obstruction. The appendix is normal. The colon is decompressed. There is mild sigmoid diverticulosis with no evidence of diverticulitis.    1. Postop changes from gastric sleeve surgery. There is edema in the upper mesentery and around the pancreas. Findings suggest acute pancreatitis. 2. There is a small hiatal hernia. There are no fluid collections around the gastric sleeve to suggest leak. There is no evidence of obstruction. 3. Mildly distended gallbladder without stones. 4. Fatty liver. 5. Normal appendix. Images reviewed, interpreted, dictated and electronically signed by Floyd Escobedo MD Voice transcription technology (Power [employer]) is used for the dictation of this note and \"sound-alike\" words might be erroneously placed despite reviewing this note for accuracy. Errors in dictation may reflect use of voice recognition software and not all errors in transcription may have been detected prior to signing.                 Plan for Follow-up of Pending Labs/Results:     Discharge Details        Discharge Medications        New Medications        Instructions Start Date   freestyle lancets   1 each, Other, As Needed, Use as instructed      FreeStyle Lite device   1 Device, Does not apply, Daily      FREESTYLE LITE test strip  Generic drug: glucose blood   1 each, Other, As Needed, Use as instructed      metFORMIN 500 MG " tablet  Commonly known as: GLUCOPHAGE   500 mg, Oral, 2 Times Daily With Meals             Changes to Medications        Instructions Start Date   hydrOXYzine 25 MG tablet  Commonly known as: ATARAX  What changed: how much to take   50 mg, Oral, Every 6 Hours PRN      thiamine 100 MG tablet  tablet  Commonly known as: VITAMIN B-1  What changed: See the new instructions.   Take 1 tablet by mouth 2 (Two) Times a Day for 7 days; THEN take 1 tablet once Daily for 21 days.   Start Date: May 31, 2024            Continue These Medications        Instructions Start Date   amLODIPine-valsartan  MG per tablet  Commonly known as: EXFORGE   1 tablet, Oral, Daily      buPROPion  MG 24 hr tablet  Commonly known as: WELLBUTRIN XL   300 mg, Oral, Daily      busPIRone 15 MG tablet  Commonly known as: BUSPAR   15 mg, Oral, 2 Times Daily      metoprolol tartrate 50 MG tablet  Commonly known as: LOPRESSOR   50 mg, Oral, 2 Times Daily      omeprazole 40 MG capsule  Commonly known as: priLOSEC   40 mg, Oral, Daily      rosuvastatin 5 MG tablet  Commonly known as: CRESTOR   5 mg, Oral, Daily             Stop These Medications      oxazepam 10 MG capsule  Commonly known as: SERAX              No Known Allergies      Discharge Disposition:  Home or Self Care    Diet:  Hospital:  Diet Order   Procedures    Diet: Diabetic; Consistent Carbohydrate; Fluid Consistency: Thin (IDDSI 0)            Activity:  - as tolerated    Restrictions or Other Recommendations:  - none       CODE STATUS:    Code Status and Medical Interventions:   Ordered at: 05/29/24 1443     Level Of Support Discussed With:    Patient     Code Status (Patient has no pulse and is not breathing):    CPR (Attempt to Resuscitate)     Medical Interventions (Patient has pulse or is breathing):    Full Support       Future Appointments   Date Time Provider Department Center   6/4/2024 10:30 AM Kayce Holly PA-C [facility],  DO  05/31/24      Time Spent on Discharge:  I spent  45  minutes on this discharge activity which included: face-to-face encounter with the patient, reviewing the data in the system, coordination of the care with the nursing staff as well as consultants, documentation, and entering orders.

## 2024-05-31 NOTE — CONSULTS
Diabetes Education    Patient Name:  Terrence James  YOB: 1975  MRN: [id number]  Admit Date:  5/29/2024      Consult received for diabetes education. Met with patient and fiance at bedside. A1c was 7.5%. He said he was not aware he had diabetes. He said he has been taking Metformin but didn't know why. He does not have a meter. He has a follow up with PCP on Tuesday. Encouraged follow up with PCP to review blood glucose management and testing. Patient has a history of a gastric sleeve and has experienced intolerances to certain foods. Placed consult for RD. Also discussed outpatient education for ongoing follow up for lifestyle changes.   Lynda interested in further education. OP education would be beneficial.       Electronically signed by:  Silva Dyer RN  05/31/24 13:16 EDT

## 2024-05-31 NOTE — OUTREACH NOTE
Prep Survey      Flowsheet Row Responses   [facility] patient discharged from? [facility]   Is LACE score < 7 ? Yes   Eligibility [facility]   Date of Admission 05/29/24   Date of Discharge 05/31/24   Discharge Disposition Home or Self Care   Discharge diagnosis Ataxia   Does the patient have one of the following disease processes/diagnoses(primary or secondary)? Other   Does the patient have Home health ordered? No   Is there a DME ordered? No   Medication alerts for this patient see AVS   Prep survey completed? Yes            Jayne PA - Registered Nurse

## 2024-06-03 ENCOUNTER — TRANSITIONAL CARE MANAGEMENT TELEPHONE ENCOUNTER (OUTPATIENT)
Dept: CALL CENTER | Facility: HOSPITAL | Age: 49
End: 2024-06-03
Payer: COMMERCIAL

## 2024-06-03 NOTE — OUTREACH NOTE
Call Center TCM Note      Flowsheet Row Responses   [facility] patient discharged from? [place]   Does the patient have one of the following disease processes/diagnoses(primary or secondary)? Other   TCM attempt successful? No  [No one listed on release]   Unsuccessful attempts Attempt 1   Call Status Left message            Michael Alcantara RN    6/3/2024, 15:27 EDT

## 2024-06-03 NOTE — OUTREACH NOTE
Call Center TCM Note      Flowsheet Row Responses   [facility] patient discharged from? [place]   Does the patient have one of the following disease processes/diagnoses(primary or secondary)? Other   TCM attempt successful? No   Unsuccessful attempts Attempt 2            Michael Alcantara RN    6/3/2024, 17:08 EDT

## 2024-06-04 ENCOUNTER — TRANSITIONAL CARE MANAGEMENT TELEPHONE ENCOUNTER (OUTPATIENT)
Dept: CALL CENTER | Facility: HOSPITAL | Age: 49
End: 2024-06-04
Payer: COMMERCIAL

## 2024-06-04 ENCOUNTER — OFFICE VISIT (OUTPATIENT)
Dept: FAMILY MEDICINE CLINIC | Facility: CLINIC | Age: 49
End: 2024-06-04
Payer: COMMERCIAL

## 2024-06-04 ENCOUNTER — LAB (OUTPATIENT)
Dept: LAB | Facility: HOSPITAL | Age: 49
End: 2024-06-04
Payer: COMMERCIAL

## 2024-06-04 VITALS
BODY MASS INDEX: 31.61 KG/M2 | HEIGHT: 71 IN | WEIGHT: 225.8 LBS | HEART RATE: 78 BPM | DIASTOLIC BLOOD PRESSURE: 84 MMHG | TEMPERATURE: 97.2 F | SYSTOLIC BLOOD PRESSURE: 122 MMHG | OXYGEN SATURATION: 94 %

## 2024-06-04 DIAGNOSIS — K85.20 ALCOHOL-INDUCED ACUTE PANCREATITIS, UNSPECIFIED COMPLICATION STATUS: Primary | ICD-10-CM

## 2024-06-04 DIAGNOSIS — K85.20 ALCOHOL-INDUCED ACUTE PANCREATITIS, UNSPECIFIED COMPLICATION STATUS: ICD-10-CM

## 2024-06-04 DIAGNOSIS — K76.0 FATTY LIVER: ICD-10-CM

## 2024-06-04 LAB
ALBUMIN SERPL-MCNC: 3.4 G/DL (ref 3.5–5.2)
ALP SERPL-CCNC: 915 U/L (ref 39–117)
ALT SERPL W P-5'-P-CCNC: 64 U/L (ref 1–41)
AMYLASE SERPL-CCNC: 42 U/L (ref 28–100)
ANION GAP SERPL CALCULATED.3IONS-SCNC: 11 MMOL/L (ref 5–15)
AST SERPL-CCNC: 45 U/L (ref 1–40)
BILIRUB CONJ SERPL-MCNC: 1.7 MG/DL (ref 0–0.3)
BILIRUB INDIRECT SERPL-MCNC: 0.7 MG/DL
BILIRUB SERPL-MCNC: 2.4 MG/DL (ref 0–1.2)
BUN SERPL-MCNC: 14 MG/DL (ref 6–20)
BUN/CREAT SERPL: 13.5 (ref 7–25)
CALCIUM SPEC-SCNC: 9.3 MG/DL (ref 8.6–10.5)
CHLORIDE SERPL-SCNC: 100 MMOL/L (ref 98–107)
CO2 SERPL-SCNC: 25 MMOL/L (ref 22–29)
CREAT SERPL-MCNC: 1.04 MG/DL (ref 0.76–1.27)
EGFRCR SERPLBLD CKD-EPI 2021: 88 ML/MIN/1.73
GLUCOSE SERPL-MCNC: 228 MG/DL (ref 65–99)
LIPASE SERPL-CCNC: 85 U/L (ref 13–60)
POTASSIUM SERPL-SCNC: 4 MMOL/L (ref 3.5–5.2)
PROT SERPL-MCNC: 7.9 G/DL (ref 6–8.5)
SODIUM SERPL-SCNC: 136 MMOL/L (ref 136–145)

## 2024-06-04 PROCEDURE — 99496 TRANSJ CARE MGMT HIGH F2F 7D: CPT | Performed by: PHYSICIAN ASSISTANT

## 2024-06-04 PROCEDURE — 83690 ASSAY OF LIPASE: CPT

## 2024-06-04 PROCEDURE — 80048 BASIC METABOLIC PNL TOTAL CA: CPT

## 2024-06-04 PROCEDURE — 82150 ASSAY OF AMYLASE: CPT

## 2024-06-04 PROCEDURE — 80076 HEPATIC FUNCTION PANEL: CPT

## 2024-06-04 PROCEDURE — 36415 COLL VENOUS BLD VENIPUNCTURE: CPT

## 2024-06-04 NOTE — PROGRESS NOTES
Transitional Care Follow Up Visit  Subjective     Terrence James is a 48 y.o. male who presents for a transitional care management visit.    Within 48 business hours after discharge our office contacted him via telephone to coordinate his care and needs.      I reviewed and discussed the details of that call along with the discharge summary, hospital problems, inpatient lab results, inpatient diagnostic studies, and consultation reports with Terrence.     Current outpatient and discharge medications have been reconciled for the patient.  Reviewed by: Kayce Holly PA-C          5/31/2024     5:47 PM   Date of TCM Phone Call   [facility]   Date of Admission 5/29/2024   Date of Discharge 5/31/2024   Discharge Disposition Home or Self Care     Risk for Readmission (LACE) Score: 6 (5/31/2024  6:00 AM)      History of Present Illness   Course During Hospital Stay:  Terrnece James is a 48 y.o. male with PMHx significant for alcohol abuse (last drink 5/19/24), HTN, anxiety/depression, DMII with recent admission to [facility] for alcoholic pancreatitis and alcohol withdrawal discharged 5/28 who presents to [facility] ED with increasing confusion, lethargy and ataxia w/some slurred speech.     AMS/Encephalopathy w/Ataxia - resolved  Lethargy/somnolence - resolved  - etiology likely multifactorial, Wernicke's in the differential given his ETOH use and gastric sleeve, he was treated with high dose IV thiamine although given his rapid improvement less likely cause, continue oral thiamine at discharge.  - suspect majority of his presentation related to benzodiazepine/oral serax as he significantly improved after being off this medication for 24 hrs.  - B12/folate level are WNL, ammonia is WNL  - no clear infectious etiology   - CT head negative, MRI brain negative for stroke  - Neurology consulted and agreed w/plan. If he continues to have some confusion at f/u with PCP, recommend neurology referral for  cognitive evaluation.  - PT/OT evaluations recommended therapy at discharge which patient prefers to do outpatient at this time.     Recent Alcoholic Pancreatitis  Alcoholic Hepatitis  Transaminitis w/Elevated bili   Hepatic Steatosis  - treated at [facility], discharge 5/28  - LFTs reviewed, AST/ALT and T.bili overall improved from recent discharge, Alk phos remains elevated, MRCP 5/26 c/w pancreatitis with cystic lesion likely an IPMN, no evidence for obstruction at that time with no plans for ERCP. GI at [facility] recommended repeat MRCP in 6-8 weeks outpatient. Patient/fiance aware.   -repeat CMP at f/u with PCP in 1 week     ETOH Abuse:  - reports drinking 1/2 of a 5th of vodka daily, has not had a drink since 5/19  - completed inpatient treatment for withdrawal at [facility] w/CIWA, discharged on oral serax, which will be discontinued at discharge  - continue PRN hydroxyzine for anxiety  - chemical dependency consulted, patient declined AUD treatment, felt he could maintain his sobriety on his own.     Urinary Incontinence  - likely secondary to benzo use, now resolved. UA unremarkable, PVR WNL     DMII:  - A1c 7.5 , resume metformin, increase to BID, discussed diet and exercise   - prescribed blood glucose monitoring kit   - f/u with PCP for further discussion regarding DM management     HTN:  - continue home norvasc/losartan    HLD:  - continue statin     Anxiety/Depression:  - continue home buspar, wellbutrin  - PRN Hydroxyzine     Discharge Follow Up Recommendations for outpatient labs/diagnostics:  - f/u with PCP in one week with CMP, DM f/u   - if continues to have confusion recommend neurology referral for cognitive eval  - f/u with GI as previously scheduled for repeat MRCP in 6-8 weeks        6/4/2024 - Today he is feeling better, no abdominal pain, nausea or vomiting since discharge. Blood pressure has been doing much better. No problems with medications. He has not had any ETOH since discharge.     The  "following portions of the patient's history were reviewed and updated as appropriate: allergies, current medications, past family history, past medical history, past social history, past surgical history, and problem list.    Review of Systems    Objective   /84   Pulse 78   Temp 97.2 °F (36.2 °C) (Infrared)   Ht 180.3 cm (71\")   Wt 102 kg (225 lb 12.8 oz)   SpO2 94%   BMI 31.49 kg/m²   Physical Exam  Vitals and nursing note reviewed.   Constitutional:       Appearance: Normal appearance.   HENT:      Head: Normocephalic and atraumatic.   Cardiovascular:      Rate and Rhythm: Normal rate and regular rhythm.   Pulmonary:      Effort: Pulmonary effort is normal.      Breath sounds: Normal breath sounds.   Musculoskeletal:      Cervical back: Neck supple.      Right lower leg: No edema.      Left lower leg: No edema.   Neurological:      Mental Status: He is alert.         Assessment & Plan   Diagnoses and all orders for this visit:    1. Alcohol-induced acute pancreatitis, unspecified complication status (Primary)  -     Hepatic Function Panel; Future  -     Basic metabolic panel; Future  -     Lipase; Future  -     Amylase; Future  -     Ambulatory Referral to Gastroenterology    2. Fatty liver  -     Ambulatory Referral to Gastroenterology      Recheck labs to ensure returning to baseline. Refer to gastroenterology for continuing care.             "

## 2024-06-04 NOTE — OUTREACH NOTE
Call Center TCM Note      Flowsheet Row Responses   [facility] patient discharged from? [facility]   Does the patient have one of the following disease processes/diagnoses(primary or secondary)? Other   TCM attempt successful? Yes   Call start time 1630  [Office appt today, no call needed.]   Call end time 1630   TCM call completed? Yes   Wrap up additional comments PCP Dr Borrero. Patient has completed a primary care office appt today. This appt, within 2 business days of discharge, fulfills TCM requirement no call needed.   Call end time 1630            Leonor Kilpatrick RN    6/4/2024, 16:31 EDT

## 2024-06-17 ENCOUNTER — HOSPITAL ENCOUNTER (EMERGENCY)
Facility: HOSPITAL | Age: 49
Discharge: HOME | End: 2024-06-17
Attending: EMERGENCY MEDICINE | Admitting: EMERGENCY MEDICINE
Payer: COMMERCIAL

## 2024-06-17 ENCOUNTER — APPOINTMENT (OUTPATIENT)
Dept: CT IMAGING | Facility: HOSPITAL | Age: 49
End: 2024-06-17
Payer: COMMERCIAL

## 2024-06-17 VITALS
WEIGHT: 214 LBS | HEIGHT: 71 IN | SYSTOLIC BLOOD PRESSURE: 104 MMHG | HEART RATE: 73 BPM | DIASTOLIC BLOOD PRESSURE: 76 MMHG | TEMPERATURE: 97.8 F | BODY MASS INDEX: 29.96 KG/M2 | RESPIRATION RATE: 16 BRPM | OXYGEN SATURATION: 95 %

## 2024-06-17 DIAGNOSIS — R10.31 RLQ ABDOMINAL PAIN: Primary | ICD-10-CM

## 2024-06-17 DIAGNOSIS — N28.9 RENAL INSUFFICIENCY: ICD-10-CM

## 2024-06-17 DIAGNOSIS — F10.11 HISTORY OF ALCOHOL ABUSE: ICD-10-CM

## 2024-06-17 LAB
ALBUMIN SERPL-MCNC: 3.6 G/DL (ref 3.5–5.2)
ALBUMIN/GLOB SERPL: 0.8 G/DL
ALP SERPL-CCNC: 489 U/L (ref 39–117)
ALT SERPL W P-5'-P-CCNC: 19 U/L (ref 1–41)
AMORPH URATE CRY URNS QL MICRO: ABNORMAL /HPF
ANION GAP SERPL CALCULATED.3IONS-SCNC: 13 MMOL/L (ref 5–15)
AST SERPL-CCNC: 28 U/L (ref 1–40)
BACTERIA UR QL AUTO: ABNORMAL /HPF
BASOPHILS # BLD AUTO: 0.07 10*3/MM3 (ref 0–0.2)
BASOPHILS NFR BLD AUTO: 1.1 % (ref 0–1.5)
BILIRUB SERPL-MCNC: 1 MG/DL (ref 0–1.2)
BILIRUB UR QL STRIP: ABNORMAL
BUN SERPL-MCNC: 25 MG/DL (ref 6–20)
BUN/CREAT SERPL: 13.7 (ref 7–25)
CALCIUM SPEC-SCNC: 9.4 MG/DL (ref 8.6–10.5)
CHLORIDE SERPL-SCNC: 103 MMOL/L (ref 98–107)
CLARITY UR: ABNORMAL
CO2 SERPL-SCNC: 23 MMOL/L (ref 22–29)
COLOR UR: ABNORMAL
CREAT SERPL-MCNC: 1.82 MG/DL (ref 0.76–1.27)
DEPRECATED RDW RBC AUTO: 56.5 FL (ref 37–54)
EGFRCR SERPLBLD CKD-EPI 2021: 45 ML/MIN/1.73
EOSINOPHIL # BLD AUTO: 0.14 10*3/MM3 (ref 0–0.4)
EOSINOPHIL NFR BLD AUTO: 2.3 % (ref 0.3–6.2)
ERYTHROCYTE [DISTWIDTH] IN BLOOD BY AUTOMATED COUNT: 17.3 % (ref 12.3–15.4)
GLOBULIN UR ELPH-MCNC: 4.6 GM/DL
GLUCOSE SERPL-MCNC: 174 MG/DL (ref 65–99)
GLUCOSE UR STRIP-MCNC: NEGATIVE MG/DL
HCT VFR BLD AUTO: 35.3 % (ref 37.5–51)
HGB BLD-MCNC: 11.4 G/DL (ref 13–17.7)
HGB UR QL STRIP.AUTO: NEGATIVE
HOLD SPECIMEN: NORMAL
HYALINE CASTS UR QL AUTO: ABNORMAL /LPF
IMM GRANULOCYTES # BLD AUTO: 0.02 10*3/MM3 (ref 0–0.05)
IMM GRANULOCYTES NFR BLD AUTO: 0.3 % (ref 0–0.5)
KETONES UR QL STRIP: ABNORMAL
LEUKOCYTE ESTERASE UR QL STRIP.AUTO: NEGATIVE
LIPASE SERPL-CCNC: 53 U/L (ref 13–60)
LYMPHOCYTES # BLD AUTO: 1.61 10*3/MM3 (ref 0.7–3.1)
LYMPHOCYTES NFR BLD AUTO: 26 % (ref 19.6–45.3)
MCH RBC QN AUTO: 28.7 PG (ref 26.6–33)
MCHC RBC AUTO-ENTMCNC: 32.3 G/DL (ref 31.5–35.7)
MCV RBC AUTO: 88.9 FL (ref 79–97)
MONOCYTES # BLD AUTO: 0.6 10*3/MM3 (ref 0.1–0.9)
MONOCYTES NFR BLD AUTO: 9.7 % (ref 5–12)
NEUTROPHILS NFR BLD AUTO: 3.76 10*3/MM3 (ref 1.7–7)
NEUTROPHILS NFR BLD AUTO: 60.6 % (ref 42.7–76)
NITRITE UR QL STRIP: NEGATIVE
NRBC BLD AUTO-RTO: 0 /100 WBC (ref 0–0.2)
PH UR STRIP.AUTO: <=5 [PH] (ref 5–8)
PLATELET # BLD AUTO: 377 10*3/MM3 (ref 140–450)
PMV BLD AUTO: 11 FL (ref 6–12)
POTASSIUM SERPL-SCNC: 4.2 MMOL/L (ref 3.5–5.2)
PROT SERPL-MCNC: 8.2 G/DL (ref 6–8.5)
PROT UR QL STRIP: ABNORMAL
RBC # BLD AUTO: 3.97 10*6/MM3 (ref 4.14–5.8)
RBC # UR STRIP: ABNORMAL /HPF
REF LAB TEST METHOD: ABNORMAL
SODIUM SERPL-SCNC: 139 MMOL/L (ref 136–145)
SP GR UR STRIP: 1.03 (ref 1–1.03)
SQUAMOUS #/AREA URNS HPF: ABNORMAL /HPF
TRANS CELLS #/AREA URNS HPF: ABNORMAL /HPF
UROBILINOGEN UR QL STRIP: ABNORMAL
WBC # UR STRIP: ABNORMAL /HPF
WBC NRBC COR # BLD AUTO: 6.2 10*3/MM3 (ref 3.4–10.8)
WHOLE BLOOD HOLD COAG: NORMAL
WHOLE BLOOD HOLD SPECIMEN: NORMAL

## 2024-06-17 PROCEDURE — 85025 COMPLETE CBC W/AUTO DIFF WBC: CPT

## 2024-06-17 PROCEDURE — 99285 EMERGENCY DEPT VISIT HI MDM: CPT

## 2024-06-17 PROCEDURE — 25810000003 SODIUM CHLORIDE 0.9 % SOLUTION: Performed by: EMERGENCY MEDICINE

## 2024-06-17 PROCEDURE — 96374 THER/PROPH/DIAG INJ IV PUSH: CPT

## 2024-06-17 PROCEDURE — 81001 URINALYSIS AUTO W/SCOPE: CPT

## 2024-06-17 PROCEDURE — 83690 ASSAY OF LIPASE: CPT

## 2024-06-17 PROCEDURE — 80053 COMPREHEN METABOLIC PANEL: CPT

## 2024-06-17 PROCEDURE — 25010000002 HYDROMORPHONE PER 4 MG: Performed by: EMERGENCY MEDICINE

## 2024-06-17 PROCEDURE — 74177 CT ABD & PELVIS W/CONTRAST: CPT

## 2024-06-17 PROCEDURE — 96375 TX/PRO/DX INJ NEW DRUG ADDON: CPT

## 2024-06-17 PROCEDURE — 36415 COLL VENOUS BLD VENIPUNCTURE: CPT

## 2024-06-17 PROCEDURE — 25010000002 ONDANSETRON PER 1 MG: Performed by: EMERGENCY MEDICINE

## 2024-06-17 PROCEDURE — 25510000001 IOPAMIDOL 61 % SOLUTION: Performed by: EMERGENCY MEDICINE

## 2024-06-17 RX ORDER — HYDROCODONE BITARTRATE AND ACETAMINOPHEN 5; 325 MG/1; MG/1
1 TABLET ORAL ONCE
Status: COMPLETED | OUTPATIENT
Start: 2024-06-17 | End: 2024-06-17

## 2024-06-17 RX ORDER — ONDANSETRON 2 MG/ML
4 INJECTION INTRAMUSCULAR; INTRAVENOUS ONCE
Status: COMPLETED | OUTPATIENT
Start: 2024-06-17 | End: 2024-06-17

## 2024-06-17 RX ORDER — HYDROMORPHONE HYDROCHLORIDE 1 MG/ML
0.5 INJECTION, SOLUTION INTRAMUSCULAR; INTRAVENOUS; SUBCUTANEOUS ONCE
Status: COMPLETED | OUTPATIENT
Start: 2024-06-17 | End: 2024-06-17

## 2024-06-17 RX ORDER — SODIUM CHLORIDE 9 MG/ML
10 INJECTION, SOLUTION INTRAMUSCULAR; INTRAVENOUS; SUBCUTANEOUS AS NEEDED
Status: DISCONTINUED | OUTPATIENT
Start: 2024-06-17 | End: 2024-06-17 | Stop reason: HOSPADM

## 2024-06-17 RX ORDER — DICYCLOMINE HCL 20 MG
20 TABLET ORAL EVERY 8 HOURS PRN
Qty: 20 TABLET | Refills: 0 | Status: SHIPPED | OUTPATIENT
Start: 2024-06-17

## 2024-06-17 RX ADMIN — ONDANSETRON 4 MG: 2 INJECTION INTRAMUSCULAR; INTRAVENOUS at 11:18

## 2024-06-17 RX ADMIN — IOPAMIDOL 85 ML: 612 INJECTION, SOLUTION INTRAVENOUS at 11:50

## 2024-06-17 RX ADMIN — SODIUM CHLORIDE 1000 ML: 9 INJECTION, SOLUTION INTRAVENOUS at 12:44

## 2024-06-17 RX ADMIN — HYDROMORPHONE HYDROCHLORIDE 0.5 MG: 1 INJECTION, SOLUTION INTRAMUSCULAR; INTRAVENOUS; SUBCUTANEOUS at 11:20

## 2024-06-17 RX ADMIN — HYDROCODONE BITARTRATE AND ACETAMINOPHEN 1 TABLET: 5; 325 TABLET ORAL at 13:27

## 2024-06-17 RX ADMIN — SODIUM CHLORIDE 500 ML: 9 INJECTION, SOLUTION INTRAVENOUS at 11:18

## 2024-06-17 NOTE — DISCHARGE INSTRUCTIONS
As we discussed, please take 72,000 units of Creon with meals and 36,000 units of Creon with snacks.

## 2024-06-18 NOTE — ED PROVIDER NOTES
Subjective   History of Present Illness  48-year-old male presents for evaluation of abdominal pain.  Of note, the patient was recently admitted to our facility from May 29 through May 31 for alcohol-related issues.  He was previously recently admitted at [facility] for alcohol induced pancreatitis.  He presents today complaining of right-sided abdominal pain, primarily in his right lower quadrant that has bothered him intermittently over the past week.  He endorses accompanying nausea, vomiting, and diarrhea as well.  He denies any fevers.  No bloody stools.  He is unsure as to what might have triggered his current symptoms.  He is accompanied by his significant other who notes that he has had a difficult time sleeping as result of his ongoing symptoms.  He currently rates his pain at 7 out of 10 in severity.  No urinary symptoms.      Review of Systems   Gastrointestinal:  Positive for abdominal pain, diarrhea, nausea and vomiting.   All other systems reviewed and are negative.      Past Medical History:   Diagnosis Date   • Anxiety    • COVID-19 08/2021   • DVT (deep venous thrombosis)     LEFT LEG    • Hypertension    • Wears reading eyeglasses        No Known Allergies    Past Surgical History:   Procedure Laterality Date   • CARDIAC CATHETERIZATION  08/2019   • CORONARY STENT PLACEMENT  08/2019   • EAR TUBES Bilateral 2012   • FEMORAL THROMBECTOMY/EMBOLECTOMY Left 10/20/2021    Procedure: ULTRASOUND GUIDED ACCESS; LEFT LEG VENOGRAM; INTRAVENOUS ULTRASOUND OF FEMORAL VEIN, POPLITEAL VEIN, EXTERNAL AND COMMON ILIAC VEINS; LEFT POPLITEAL VEIN ANGIOPLASTY; LEFT COMMON ILIAC VEIN ANGIOPLASTY; POPLITEAL AND FEMORAL VEIN THROMBECTOMY - LEFT;  Surgeon: Arnel Gordon MD;  Location: [facility];  Service: Vascular;  Laterality: Left;  FLUORO:3 MIN 48 SEC  DOSE: 45 MGY  CONTRAS   • GASTRIC SLEEVE LAPAROSCOPIC  03/2014   • LUMBAR DISCECTOMY  2006    L3-4   • VASECTOMY         Family History   Problem Relation Age of  Onset   • Vision loss Mother    • Diabetes Mother    • Kidney disease Mother    • Skin cancer Mother         squamous cell   • Heart attack Father    • No Known Problems Sister    • No Known Problems Sister    • No Known Problems Brother    • Other Maternal Grandmother         unknown   • Other Maternal Grandfather         unknown   • Other Paternal Grandmother         unknown   • Other Paternal Grandfather         unknown   • No Known Problems Daughter    • No Known Problems Son        Social History     Socioeconomic History   • Marital status: Single   Tobacco Use   • Smoking status: Former     Current packs/day: 0.00     Average packs/day: 0.3 packs/day for 22.0 years (5.5 ttl pk-yrs)     Types: Cigarettes     Start date: 1999     Quit date: 2021     Years since quitting: 3.4   • Smokeless tobacco: Never   Vaping Use   • Vaping status: Never Used   Substance and Sexual Activity   • Alcohol use: Yes     Comment: ~2-3 DRINK WEEKLY    • Drug use: Never   • Sexual activity: Yes     Partners: Female     Comment: 5-6 female partners in the last year.           Objective   Physical Exam  Vitals and nursing note reviewed.   Constitutional:       General: He is not in acute distress.     Appearance: He is well-developed. He is not diaphoretic.      Comments: Nontoxic-appearing male   HENT:      Head: Normocephalic and atraumatic.   Neck:      Vascular: No JVD.   Cardiovascular:      Rate and Rhythm: Normal rate and regular rhythm.      Heart sounds: Normal heart sounds. No murmur heard.     No friction rub. No gallop.   Pulmonary:      Effort: Pulmonary effort is normal. No respiratory distress.      Breath sounds: Normal breath sounds. No wheezing or rales.   Abdominal:      General: Bowel sounds are normal. There is no distension.      Palpations: Abdomen is soft. There is no mass.      Tenderness: There is abdominal tenderness. There is guarding.      Comments: Focal right lower quadrant abdominal tenderness noted  with guarding present, no pain out of proportion to exam   Genitourinary:     Comments: No CVA tenderness noted  Musculoskeletal:         General: Normal range of motion.      Cervical back: Normal range of motion.   Skin:     General: Skin is warm and dry.      Coloration: Skin is not pale.      Findings: No erythema or rash.      Comments: No dermatomal rash present   Neurological:      Mental Status: He is alert and oriented to person, place, and time.   Psychiatric:         Thought Content: Thought content normal.         Judgment: Judgment normal.         Procedures           ED Course  ED Course as of 06/17/24 2146 Mon Jun 17, 2024   1235 48-year-old male presents for evaluation of abdominal pain.  Of note, the patient was recently admitted to our facility from May 29 through May 31 for alcohol related issues.  He was previously recently admitted at [facility] for alcohol induced pancreatitis.  He presents today complaining of right-sided abdominal pain, primarily in his right lower quadrant that has bothered him intermittently over the past week.  He endorses accompanying nausea, vomiting, and diarrhea as well.  On arrival to the ED, the patient is nontoxic-appearing.  On exam, the patient has focal right lower quadrant abdominal tenderness with guarding noted.  No pain out of proportion to exam.  Negative Deleon's sign. [DD]   1236 Labs remarkable only for renal insufficiency when compared to priors.  IV fluids given. [DD]   1236 I personally and independently reviewed the patient's CT images and findings, and I am in agreement with the radiologist regarding CT interpretation--particularly regarding pancreatic abnormality. [DD]   1236 After reviewing the patient's labs and imaging, I reached out to Dr. Brunner with gastroenterology.  Awaiting callback at this time.    I discussed the patient's case with Dr. Brunner of gastroenterology.  He personally reviewed the patient's CT imaging and feels that his  cysts are natural progression of his pancreatitis.  He is recommending that we start the patient on Creon, 36,000 units with snacks and 72,000 units with meals.  The patient has follow-up with gastroenterology scheduled for 4 days from now, and he will follow-up as previously scheduled.  Additionally, Dr. Brunner is recommending a follow-up CT scan in 6 to 8 weeks.  I agree with his expert opinion.  The patient is tolerating p.o.  I feel he can be managed on outpatient basis.  He will follow-up this Friday.  Agreeable with plan and given appropriate strict return precautions. [DD]      ED Course User Index  [DD] Junior Schneider MD                                 Recent Results (from the past 24 hour(s))   Urinalysis With Microscopic If Indicated (No Culture) - Urine, Clean Catch    Collection Time: 06/17/24 10:25 AM    Specimen: Urine, Clean Catch   Result Value Ref Range    Color, UA Dark Yellow (A) Yellow, Straw    Appearance, UA Cloudy (A) Clear    pH, UA <=5.0 5.0 - 8.0    Specific Gravity, UA 1.033 (H) 1.001 - 1.030    Glucose, UA Negative Negative    Ketones, UA Trace (A) Negative    Bilirubin, UA Small (1+) (A) Negative    Blood, UA Negative Negative    Protein, UA Trace (A) Negative    Leuk Esterase, UA Negative Negative    Nitrite, UA Negative Negative    Urobilinogen, UA 1.0 E.U./dL 0.2 - 1.0 E.U./dL   Urinalysis, Microscopic Only - Urine, Clean Catch    Collection Time: 06/17/24 10:25 AM    Specimen: Urine, Clean Catch   Result Value Ref Range    RBC, UA 0-2 None Seen, 0-2 /HPF    WBC, UA 3-5 (A) None Seen, 0-2 /HPF    Bacteria, UA Trace None Seen, Trace /HPF    Squamous Epithelial Cells, UA 0-2 None Seen, 0-2 /HPF    Transitional Epithelial Cells, UA 0-2 0 - 2 /HPF    Hyaline Casts, UA 31-50 0 - 6 /LPF    Amorphous Crystals, UA Small/1+ None Seen /HPF    Methodology Manual Light Microscopy    Comprehensive Metabolic Panel    Collection Time: 06/17/24 10:29 AM    Specimen: Blood   Result Value Ref  Range    Glucose 174 (H) 65 - 99 mg/dL    BUN 25 (H) 6 - 20 mg/dL    Creatinine 1.82 (H) 0.76 - 1.27 mg/dL    Sodium 139 136 - 145 mmol/L    Potassium 4.2 3.5 - 5.2 mmol/L    Chloride 103 98 - 107 mmol/L    CO2 23.0 22.0 - 29.0 mmol/L    Calcium 9.4 8.6 - 10.5 mg/dL    Total Protein 8.2 6.0 - 8.5 g/dL    Albumin 3.6 3.5 - 5.2 g/dL    ALT (SGPT) 19 1 - 41 U/L    AST (SGOT) 28 1 - 40 U/L    Alkaline Phosphatase 489 (H) 39 - 117 U/L    Total Bilirubin 1.0 0.0 - 1.2 mg/dL    Globulin 4.6 gm/dL    A/G Ratio 0.8 g/dL    BUN/Creatinine Ratio 13.7 7.0 - 25.0    Anion Gap 13.0 5.0 - 15.0 mmol/L    eGFR 45.0 (L) >60.0 mL/min/1.73   Lipase    Collection Time: 06/17/24 10:29 AM    Specimen: Blood   Result Value Ref Range    Lipase 53 13 - 60 U/L   Green Top (Gel)    Collection Time: 06/17/24 10:29 AM   Result Value Ref Range    Extra Tube Hold for add-ons.    Lavender Top    Collection Time: 06/17/24 10:29 AM   Result Value Ref Range    Extra Tube hold for add-on    Gold Top - SST    Collection Time: 06/17/24 10:29 AM   Result Value Ref Range    Extra Tube Hold for add-ons.    Gray Top    Collection Time: 06/17/24 10:29 AM   Result Value Ref Range    Extra Tube Hold for add-ons.    Light Blue Top    Collection Time: 06/17/24 10:29 AM   Result Value Ref Range    Extra Tube Hold for add-ons.    CBC Auto Differential    Collection Time: 06/17/24 10:29 AM    Specimen: Blood   Result Value Ref Range    WBC 6.20 3.40 - 10.80 10*3/mm3    RBC 3.97 (L) 4.14 - 5.80 10*6/mm3    Hemoglobin 11.4 (L) 13.0 - 17.7 g/dL    Hematocrit 35.3 (L) 37.5 - 51.0 %    MCV 88.9 79.0 - 97.0 fL    MCH 28.7 26.6 - 33.0 pg    MCHC 32.3 31.5 - 35.7 g/dL    RDW 17.3 (H) 12.3 - 15.4 %    RDW-SD 56.5 (H) 37.0 - 54.0 fl    MPV 11.0 6.0 - 12.0 fL    Platelets 377 140 - 450 10*3/mm3    Neutrophil % 60.6 42.7 - 76.0 %    Lymphocyte % 26.0 19.6 - 45.3 %    Monocyte % 9.7 5.0 - 12.0 %    Eosinophil % 2.3 0.3 - 6.2 %    Basophil % 1.1 0.0 - 1.5 %    Immature Grans %  0.3 0.0 - 0.5 %    Neutrophils, Absolute 3.76 1.70 - 7.00 10*3/mm3    Lymphocytes, Absolute 1.61 0.70 - 3.10 10*3/mm3    Monocytes, Absolute 0.60 0.10 - 0.90 10*3/mm3    Eosinophils, Absolute 0.14 0.00 - 0.40 10*3/mm3    Basophils, Absolute 0.07 0.00 - 0.20 10*3/mm3    Immature Grans, Absolute 0.02 0.00 - 0.05 10*3/mm3    nRBC 0.0 0.0 - 0.2 /100 WBC     Note: In addition to lab results from this visit, the labs listed above may include labs taken at another facility or during a different encounter within the last 24 hours. Please correlate lab times with ED admission and discharge times for further clarification of the services performed during this visit.    CT Abdomen Pelvis With Contrast   Final Result   Impression:   1.Pancreatic head/body appears somewhat atrophic. Lobulated fluid density collection is seen about the pancreatic head/body measuring approximately 8 x 6.5 x 10 cm. Findings may represent pseudocysts related to prior episode of pancreatitis. Infected    fluid collection cannot be excluded. Please correlate with patient's clinical presentation and consider further evaluation with pancreatic protocol MRI with MRCP.   2.Mild to moderate biliary dilation. CBD measures 15 mm. This may be related to mass effect from the described fluid collection or pancreatic edema.   3.Additional findings as detailed above.      Electronically Signed: Forrest Contreras MD     6/17/2024 12:05 PM EDT     Workstation ID: [id number]        Vitals:    06/17/24 1230 06/17/24 1258 06/17/24 1259 06/17/24 1300   BP: 105/72   104/76   BP Location:       Patient Position:       Pulse: 71 69 73 73   Resp:       Temp:       SpO2: 96% 90% 97% 95%   Weight:       Height:         Medications   sodium chloride 0.9 % bolus 500 mL (0 mL Intravenous Stopped 6/17/24 1148)   HYDROmorphone (DILAUDID) injection 0.5 mg (0.5 mg Intravenous Given 6/17/24 1120)   ondansetron (ZOFRAN) injection 4 mg (4 mg Intravenous Given 6/17/24 1118)   iopamidol  (ISOVUE-300) 61 % injection 85 mL (85 mL Intravenous Given 6/17/24 1150)   sodium chloride 0.9 % bolus 1,000 mL (0 mL Intravenous Stopped 6/17/24 1332)   HYDROcodone-acetaminophen (NORCO) 5-325 MG per tablet 1 tablet (1 tablet Oral Given 6/17/24 1327)     ECG/EMG Results (last 24 hours)       ** No results found for the last 24 hours. **          No orders to display                   Medical Decision Making  Problems Addressed:  History of alcohol abuse: complicated acute illness or injury  Renal insufficiency: complicated acute illness or injury  RLQ abdominal pain: complicated acute illness or injury    Amount and/or Complexity of Data Reviewed  Radiology: ordered.    Risk  Prescription drug management.        Final diagnoses:   RLQ abdominal pain   Renal insufficiency   History of alcohol abuse       ED Disposition  ED Disposition       ED Disposition   Discharge    Condition   Stable    Comment   --               Nat Guillen PAShermanC  [address]  [place] [address]  [phone]    In 4 days           Medication List        New Prescriptions      dicyclomine 20 MG tablet  Commonly known as: BENTYL  Take 1 tablet by mouth Every 8 (Eight) Hours As Needed for Abdominal Cramping.     Pancrelipase (Lip-Prot-Amyl) [id number] units capsule delayed-release particles capsule  Commonly known as: Creon  Take 1 capsule by mouth 3 (Three) Times a Day With Meals.               Where to Get Your Medications        These medications were sent to [id number] - [place], [place] - [address] AT [facility] [address] - [phone]  - [phone] FX  [address], located within [facility] [address]      Phone: [phone]   dicyclomine 20 MG tablet  Pancrelipase (Lip-Prot-Amyl) [id number] units capsule delayed-release particles capsule            Junior Schneider MD  06/17/24 2989

## 2024-06-19 ENCOUNTER — TELEPHONE (OUTPATIENT)
Dept: GASTROENTEROLOGY | Facility: CLINIC | Age: 49
End: 2024-06-19

## 2024-06-19 NOTE — TELEPHONE ENCOUNTER
CALLED PT TO DISCUSS HIS APPT ON 6/21, AS WE NEED TO RESCHEDULE THIS APPT. DID NOT REACH THE PT AND UNABLE TO LEAVE A VOICEMAIL AS MAIL BOX IS FULL.

## 2024-06-20 DIAGNOSIS — I25.10 CORONARY ARTERY DISEASE INVOLVING NATIVE CORONARY ARTERY OF NATIVE HEART WITHOUT ANGINA PECTORIS: ICD-10-CM

## 2024-06-20 RX ORDER — ROSUVASTATIN CALCIUM 5 MG/1
5 TABLET, COATED ORAL DAILY
Qty: 90 TABLET | Refills: 0 | Status: SHIPPED | OUTPATIENT
Start: 2024-06-20

## 2024-06-21 ENCOUNTER — HOSPITAL ENCOUNTER (INPATIENT)
Facility: HOSPITAL | Age: 49
LOS: 1 days | Discharge: OTHER INSTITUTION | DRG: 439 | End: 2024-06-21
Attending: EMERGENCY MEDICINE | Admitting: INTERNAL MEDICINE
Payer: COMMERCIAL

## 2024-06-21 ENCOUNTER — APPOINTMENT (OUTPATIENT)
Dept: CT IMAGING | Facility: HOSPITAL | Age: 49
DRG: 439 | End: 2024-06-21
Payer: COMMERCIAL

## 2024-06-21 VITALS
BODY MASS INDEX: 29.26 KG/M2 | SYSTOLIC BLOOD PRESSURE: 106 MMHG | HEART RATE: 79 BPM | HEIGHT: 71 IN | RESPIRATION RATE: 15 BRPM | WEIGHT: 209 LBS | TEMPERATURE: 97.2 F | DIASTOLIC BLOOD PRESSURE: 70 MMHG | OXYGEN SATURATION: 100 %

## 2024-06-21 DIAGNOSIS — R11.2 NAUSEA AND VOMITING, UNSPECIFIED VOMITING TYPE: ICD-10-CM

## 2024-06-21 DIAGNOSIS — E87.5 HYPERKALEMIA: ICD-10-CM

## 2024-06-21 DIAGNOSIS — K86.3 PANCREATIC PSEUDOCYST: ICD-10-CM

## 2024-06-21 DIAGNOSIS — N17.9 ACUTE KIDNEY INJURY: ICD-10-CM

## 2024-06-21 DIAGNOSIS — R10.10 ACUTE UPPER ABDOMINAL PAIN: Primary | ICD-10-CM

## 2024-06-21 LAB
ALBUMIN SERPL-MCNC: 3.5 G/DL (ref 3.5–5.2)
ALBUMIN/GLOB SERPL: 0.7 G/DL
ALP SERPL-CCNC: 1431 U/L (ref 39–117)
ALT SERPL W P-5'-P-CCNC: 51 U/L (ref 1–41)
ANION GAP SERPL CALCULATED.3IONS-SCNC: 14 MMOL/L (ref 5–15)
AST SERPL-CCNC: 68 U/L (ref 1–40)
BACTERIA UR QL AUTO: ABNORMAL /HPF
BASOPHILS # BLD AUTO: 0.09 10*3/MM3 (ref 0–0.2)
BASOPHILS NFR BLD AUTO: 1.4 % (ref 0–1.5)
BILIRUB SERPL-MCNC: 1.1 MG/DL (ref 0–1.2)
BILIRUB UR QL STRIP: ABNORMAL
BUN SERPL-MCNC: 30 MG/DL (ref 6–20)
BUN/CREAT SERPL: 10 (ref 7–25)
CALCIUM SPEC-SCNC: 9.7 MG/DL (ref 8.6–10.5)
CHLORIDE SERPL-SCNC: 98 MMOL/L (ref 98–107)
CLARITY UR: ABNORMAL
CO2 SERPL-SCNC: 19 MMOL/L (ref 22–29)
COLOR UR: ABNORMAL
CREAT BLDA-MCNC: 3.4 MG/DL (ref 0.6–1.3)
CREAT BLDA-MCNC: 3.4 MG/DL (ref 0.6–1.3)
CREAT SERPL-MCNC: 3.01 MG/DL (ref 0.76–1.27)
DEPRECATED RDW RBC AUTO: 56.4 FL (ref 37–54)
EGFRCR SERPLBLD CKD-EPI 2021: 24.6 ML/MIN/1.73
EOSINOPHIL # BLD AUTO: 0.11 10*3/MM3 (ref 0–0.4)
EOSINOPHIL NFR BLD AUTO: 1.7 % (ref 0.3–6.2)
ERYTHROCYTE [DISTWIDTH] IN BLOOD BY AUTOMATED COUNT: 17.3 % (ref 12.3–15.4)
GLOBULIN UR ELPH-MCNC: 5.1 GM/DL
GLUCOSE SERPL-MCNC: 183 MG/DL (ref 65–99)
GLUCOSE UR STRIP-MCNC: NEGATIVE MG/DL
HCT VFR BLD AUTO: 37.7 % (ref 37.5–51)
HGB BLD-MCNC: 12 G/DL (ref 13–17.7)
HGB UR QL STRIP.AUTO: NEGATIVE
HOLD SPECIMEN: NORMAL
HYALINE CASTS UR QL AUTO: ABNORMAL /LPF
IMM GRANULOCYTES # BLD AUTO: 0.02 10*3/MM3 (ref 0–0.05)
IMM GRANULOCYTES NFR BLD AUTO: 0.3 % (ref 0–0.5)
KETONES UR QL STRIP: ABNORMAL
LEUKOCYTE ESTERASE UR QL STRIP.AUTO: ABNORMAL
LIPASE SERPL-CCNC: 57 U/L (ref 13–60)
LYMPHOCYTES # BLD AUTO: 2.16 10*3/MM3 (ref 0.7–3.1)
LYMPHOCYTES NFR BLD AUTO: 33.7 % (ref 19.6–45.3)
MCH RBC QN AUTO: 28.1 PG (ref 26.6–33)
MCHC RBC AUTO-ENTMCNC: 31.8 G/DL (ref 31.5–35.7)
MCV RBC AUTO: 88.3 FL (ref 79–97)
MONOCYTES # BLD AUTO: 0.96 10*3/MM3 (ref 0.1–0.9)
MONOCYTES NFR BLD AUTO: 15 % (ref 5–12)
NEUTROPHILS NFR BLD AUTO: 3.07 10*3/MM3 (ref 1.7–7)
NEUTROPHILS NFR BLD AUTO: 47.9 % (ref 42.7–76)
NITRITE UR QL STRIP: NEGATIVE
NRBC BLD AUTO-RTO: 0 /100 WBC (ref 0–0.2)
PH UR STRIP.AUTO: <=5 [PH] (ref 5–8)
PLATELET # BLD AUTO: 402 10*3/MM3 (ref 140–450)
PMV BLD AUTO: 10.7 FL (ref 6–12)
POTASSIUM SERPL-SCNC: 5.7 MMOL/L (ref 3.5–5.2)
PROT SERPL-MCNC: 8.6 G/DL (ref 6–8.5)
PROT UR QL STRIP: ABNORMAL
QT INTERVAL: 428 MS
QTC INTERVAL: 448 MS
RBC # BLD AUTO: 4.27 10*6/MM3 (ref 4.14–5.8)
RBC # UR STRIP: ABNORMAL /HPF
REF LAB TEST METHOD: ABNORMAL
SODIUM SERPL-SCNC: 131 MMOL/L (ref 136–145)
SP GR UR STRIP: 1.04 (ref 1–1.03)
SQUAMOUS #/AREA URNS HPF: ABNORMAL /HPF
UROBILINOGEN UR QL STRIP: ABNORMAL
WBC # UR STRIP: ABNORMAL /HPF
WBC NRBC COR # BLD AUTO: 6.41 10*3/MM3 (ref 3.4–10.8)
WHOLE BLOOD HOLD COAG: NORMAL
WHOLE BLOOD HOLD SPECIMEN: NORMAL

## 2024-06-21 PROCEDURE — 93005 ELECTROCARDIOGRAM TRACING: CPT | Performed by: EMERGENCY MEDICINE

## 2024-06-21 PROCEDURE — 74176 CT ABD & PELVIS W/O CONTRAST: CPT

## 2024-06-21 PROCEDURE — 25810000003 SODIUM CHLORIDE 0.9 % SOLUTION: Performed by: INTERNAL MEDICINE

## 2024-06-21 PROCEDURE — 80053 COMPREHEN METABOLIC PANEL: CPT | Performed by: EMERGENCY MEDICINE

## 2024-06-21 PROCEDURE — 85025 COMPLETE CBC W/AUTO DIFF WBC: CPT | Performed by: EMERGENCY MEDICINE

## 2024-06-21 PROCEDURE — 36415 COLL VENOUS BLD VENIPUNCTURE: CPT

## 2024-06-21 PROCEDURE — 25010000002 MORPHINE PER 10 MG: Performed by: EMERGENCY MEDICINE

## 2024-06-21 PROCEDURE — 25810000003 SODIUM CHLORIDE 0.9 % SOLUTION: Performed by: EMERGENCY MEDICINE

## 2024-06-21 PROCEDURE — 96374 THER/PROPH/DIAG INJ IV PUSH: CPT

## 2024-06-21 PROCEDURE — 96361 HYDRATE IV INFUSION ADD-ON: CPT

## 2024-06-21 PROCEDURE — 82565 ASSAY OF CREATININE: CPT

## 2024-06-21 PROCEDURE — 99285 EMERGENCY DEPT VISIT HI MDM: CPT

## 2024-06-21 PROCEDURE — 96375 TX/PRO/DX INJ NEW DRUG ADDON: CPT

## 2024-06-21 PROCEDURE — 81001 URINALYSIS AUTO W/SCOPE: CPT | Performed by: EMERGENCY MEDICINE

## 2024-06-21 PROCEDURE — 83690 ASSAY OF LIPASE: CPT | Performed by: EMERGENCY MEDICINE

## 2024-06-21 PROCEDURE — 25010000002 DROPERIDOL PER 5 MG: Performed by: EMERGENCY MEDICINE

## 2024-06-21 RX ORDER — MORPHINE SULFATE 4 MG/ML
4 INJECTION, SOLUTION INTRAMUSCULAR; INTRAVENOUS ONCE
Status: COMPLETED | OUTPATIENT
Start: 2024-06-21 | End: 2024-06-21

## 2024-06-21 RX ORDER — SODIUM CHLORIDE 9 MG/ML
10 INJECTION, SOLUTION INTRAMUSCULAR; INTRAVENOUS; SUBCUTANEOUS AS NEEDED
Status: DISCONTINUED | OUTPATIENT
Start: 2024-06-21 | End: 2024-06-21 | Stop reason: HOSPADM

## 2024-06-21 RX ORDER — DROPERIDOL 2.5 MG/ML
2.5 INJECTION, SOLUTION INTRAMUSCULAR; INTRAVENOUS ONCE
Status: COMPLETED | OUTPATIENT
Start: 2024-06-21 | End: 2024-06-21

## 2024-06-21 RX ORDER — FAMOTIDINE 10 MG/ML
20 INJECTION, SOLUTION INTRAVENOUS ONCE
Status: COMPLETED | OUTPATIENT
Start: 2024-06-21 | End: 2024-06-21

## 2024-06-21 RX ORDER — SODIUM CHLORIDE 9 MG/ML
125 INJECTION, SOLUTION INTRAVENOUS CONTINUOUS
Status: DISCONTINUED | OUTPATIENT
Start: 2024-06-21 | End: 2024-06-21 | Stop reason: HOSPADM

## 2024-06-21 RX ADMIN — DROPERIDOL 2.5 MG: 2.5 INJECTION, SOLUTION INTRAMUSCULAR; INTRAVENOUS at 13:40

## 2024-06-21 RX ADMIN — SODIUM CHLORIDE 125 ML/HR: 9 INJECTION, SOLUTION INTRAVENOUS at 17:25

## 2024-06-21 RX ADMIN — SODIUM CHLORIDE 1000 ML: 9 INJECTION, SOLUTION INTRAVENOUS at 13:38

## 2024-06-21 RX ADMIN — MORPHINE SULFATE 4 MG: 4 INJECTION, SOLUTION INTRAMUSCULAR; INTRAVENOUS at 14:44

## 2024-06-21 RX ADMIN — SODIUM CHLORIDE 1000 ML: 900 INJECTION, SOLUTION INTRAVENOUS at 14:42

## 2024-06-21 RX ADMIN — FAMOTIDINE 20 MG: 10 INJECTION, SOLUTION INTRAVENOUS at 13:41

## 2024-06-21 RX ADMIN — SODIUM ZIRCONIUM CYCLOSILICATE 10 G: 10 POWDER, FOR SUSPENSION ORAL at 16:27

## 2024-06-21 NOTE — ED PROVIDER NOTES
[place]    EMERGENCY DEPARTMENT ENCOUNTER      Pt Name: Terrence James  MRN: [id number]  YOB: 1975  Date of evaluation: 6/21/2024  Provider: Michael Cardona MD    CHIEF COMPLAINT       Chief Complaint   Patient presents with    Abdominal Pain    Difficulty Urinating    Vomiting         HISTORY OF PRESENT ILLNESS   eTrrence James is a 48 y.o. male who presents to the emergency department with complaint of persistent upper abdominal pain and vomiting.  Patient recently admitted here for an episode of alcoholic pancreatitis.  He was ultimately discharged.  He came back to the ED a few days ago due to persistent symptoms along with vomiting and was treated in the ED and ultimately discharged.  He presents today.  He says that he has been able to tolerate liquids but anytime he tries to eat any food he vomits almost immediately.  His abdominal pain is also worse with eating.  He tells me that he has not had any alcohol since May.      Nursing notes were reviewed.    REVIEW OF SYSTEMS     ROS:  A chief complaint appropriate review of systems was completed and is negative except as noted in the HPI.      PAST MEDICAL HISTORY     Past Medical History:   Diagnosis Date    Anxiety     COVID-19 08/2021    DVT (deep venous thrombosis)     LEFT LEG     Hypertension     Wears reading eyeglasses          SURGICAL HISTORY       Past Surgical History:   Procedure Laterality Date    CARDIAC CATHETERIZATION  08/2019    CORONARY STENT PLACEMENT  08/2019    EAR TUBES Bilateral 2012    FEMORAL THROMBECTOMY/EMBOLECTOMY Left 10/20/2021    Procedure: ULTRASOUND GUIDED ACCESS; LEFT LEG VENOGRAM; INTRAVENOUS ULTRASOUND OF FEMORAL VEIN, POPLITEAL VEIN, EXTERNAL AND COMMON ILIAC VEINS; LEFT POPLITEAL VEIN ANGIOPLASTY; LEFT COMMON ILIAC VEIN ANGIOPLASTY; POPLITEAL AND FEMORAL VEIN THROMBECTOMY - LEFT;  Surgeon: Arnel Gordon MD;  Location: [facility];  Service: Vascular;  Laterality: Left;  FLUORO:3 MIN 48 SEC  DOSE: 45  MGY  CONTRAS    GASTRIC SLEEVE LAPAROSCOPIC  03/2014    LUMBAR DISCECTOMY  2006    L3-4    VASECTOMY           CURRENT MEDICATIONS       Current Facility-Administered Medications:     Sodium Chloride (PF) 0.9 % 10 mL, 10 mL, Intravenous, PRN, Michael Cardona MD    sodium chloride 0.9 % infusion, 125 mL/hr, Intravenous, Continuous, Subha Cordova MD, Stopped at 06/21/24 6457    Current Outpatient Medications:     amLODIPine-valsartan (EXFORGE)  MG per tablet, TAKE 1 TABLET BY MOUTH DAILY, Disp: 30 tablet, Rfl: 2    Blood Glucose Monitoring Suppl (FreeStyle Lite) device, Use 1 Device Daily., Disp: 1 each, Rfl: 0    buPROPion XL (WELLBUTRIN XL) 300 MG 24 hr tablet, Take 1 tablet by mouth Daily., Disp: 30 tablet, Rfl: 5    busPIRone (BUSPAR) 15 MG tablet, Take 1 tablet by mouth 2 (Two) Times a Day., Disp: 60 tablet, Rfl: 2    dicyclomine (BENTYL) 20 MG tablet, Take 1 tablet by mouth Every 8 (Eight) Hours As Needed for Abdominal Cramping., Disp: 20 tablet, Rfl: 0    glucose blood (FREESTYLE LITE) test strip, 1 each by Other route As Needed (to check blood sugar). Use as instructed, Disp: 100 each, Rfl: 0    hydrOXYzine (ATARAX) 25 MG tablet, Take 2 tablets by mouth Every 6 (Six) Hours As Needed for Anxiety., Disp: , Rfl:     Lancets (freestyle) lancets, 1 each by Other route As Needed (to check blood sugar). Use as instructed, Disp: 100 each, Rfl: 0    metFORMIN (GLUCOPHAGE) 500 MG tablet, Take 1 tablet by mouth 2 (Two) Times a Day With Meals for 30 days., Disp: 60 tablet, Rfl: 0    metoprolol tartrate (LOPRESSOR) 50 MG tablet, Take 1 tablet by mouth 2 (Two) Times a Day., Disp: 180 tablet, Rfl: 3    omeprazole (priLOSEC) 40 MG capsule, TAKE 1 CAPSULE BY MOUTH DAILY, Disp: 30 capsule, Rfl: 2    Pancrelipase, Lip-Prot-Amyl, (Creon) 62812-630241 units capsule delayed-release particles capsule, Take 1 capsule by mouth 3 (Three) Times a Day With Meals., Disp: 180 capsule, Rfl: 0    rosuvastatin (CRESTOR) 5 MG  tablet, TAKE 1 TABLET BY MOUTH DAILY, Disp: 90 tablet, Rfl: 0    thiamine (VITAMIN B-1) 100 MG tablet  tablet, Take 1 tablet by mouth 2 (Two) Times a Day for 7 days; THEN take 1 tablet once Daily for 21 days., Disp: 35 tablet, Rfl: 0    ALLERGIES     Patient has no known allergies.    FAMILY HISTORY       Family History   Problem Relation Age of Onset    Vision loss Mother     Diabetes Mother     Kidney disease Mother     Skin cancer Mother         squamous cell    Heart attack Father     No Known Problems Sister     No Known Problems Sister     No Known Problems Brother     Other Maternal Grandmother         unknown    Other Maternal Grandfather         unknown    Other Paternal Grandmother         unknown    Other Paternal Grandfather         unknown    No Known Problems Daughter     No Known Problems Son           SOCIAL HISTORY       Social History     Socioeconomic History    Marital status: Single   Tobacco Use    Smoking status: Former     Current packs/day: 0.00     Average packs/day: 0.3 packs/day for 22.0 years (5.5 ttl pk-yrs)     Types: Cigarettes     Start date: 1999     Quit date: 2021     Years since quitting: 3.4    Smokeless tobacco: Never   Vaping Use    Vaping status: Never Used   Substance and Sexual Activity    Alcohol use: Yes     Comment: ~2-3 DRINK WEEKLY     Drug use: Never    Sexual activity: Yes     Partners: Female     Comment: 5-6 female partners in the last year.         PHYSICAL EXAM    (up to 7 for level 4, 8 or more for level 5)     Vitals:    06/21/24 2058 06/21/24 2100 06/21/24 2115 06/21/24 2138   BP:  98/70 106/70 106/70   BP Location:       Patient Position:       Pulse: 80 81 79 79   Resp:    15   Temp:    97.2 °F (36.2 °C)   TempSrc:       SpO2: 100% 99% 100% 100%   Weight:       Height:           General: Awake, alert, no acute distress.  HEENT: Conjunctivae normal.  Neck: Trachea midline.  Cardiac: Heart regular rate, rhythm, no murmurs, rubs, or gallops  Lungs: Lungs are  clear to auscultation, there is no wheezing, rhonchi, or rales. There is no use of accessory muscles.  Chest wall: There is no tenderness to palpation over the chest wall or over ribs  Abdomen: Moderate upper abdominal tenderness.  No distention, rebound, or guarding.  Musculoskeletal: No deformity.  Neuro: Alert and oriented x 4.  Dermatology: Skin is warm and dry  Psych: Mentation is grossly normal, cognition is grossly normal. Affect is appropriate.        DIAGNOSTIC RESULTS     EKG: All EKGs are interpreted by the Emergency Department Physician who either signs or Co-signs this chart in the absence of a cardiologist.    ECG 12 Lead Electrolyte Imbalance   Preliminary Result   Test Reason : Electrolyte Imbalance   Blood Pressure :   */*   mmHG   Vent. Rate :  66 BPM     Atrial Rate :  66 BPM      P-R Int : 140 ms          QRS Dur :  88 ms       QT Int : 428 ms       P-R-T Axes :  28 -15   0 degrees      QTc Int : 448 ms      Normal sinus rhythm with sinus arrhythmia   Minimal voltage criteria for LVH, may be normal variant   Cannot rule out Anterior infarct , age undetermined   Abnormal ECG   When compared with ECG of 18-OCT-2021 10:10,   T wave amplitude has decreased in Anterior leads      Referred By: EDMD           Confirmed By:             RADIOLOGY:   [x] Radiologist's Report Reviewed:  CT Abdomen Pelvis Without Contrast   Final Result   Impression:      1. There continues to be round fluid collections extending cephalad off the pancreatic head/body into the lesser sac and extending caudally off the uncinate process with mass effect on the horizontal limb of the duodenal C-loop. There are no gas bubbles.    There is mild inflammation in the adjacent fat. These are presumed to represent pseudocysts.   2. There continues to be dilatation of the biliary tree. I would recommend correlation with liver enzyme levels. An MRCP may be of additional value.   3. Previous gastric sleeve surgery. There is a small hiatal  hernia.   4. There is small lymph nodes in the upper abdomen which were probably due to reactive hyperplasia.   5. Additional findings as noted above. The exam is limited by noncontrasted technique.                  Electronically Signed: Shun Holden MD     6/21/2024 2:13 PM EDT     Workstation ID: [id number]          I ordered and independently reviewed the above noted radiographic studies.        LABS:    I have reviewed and interpreted all of the currently available lab results from this visit (if applicable):  Results for orders placed or performed during the hospital encounter of 06/21/24   Comprehensive Metabolic Panel    Specimen: Blood   Result Value Ref Range    Glucose 183 (H) 65 - 99 mg/dL    BUN 30 (H) 6 - 20 mg/dL    Creatinine 3.01 (H) 0.76 - 1.27 mg/dL    Sodium 131 (L) 136 - 145 mmol/L    Potassium 5.7 (H) 3.5 - 5.2 mmol/L    Chloride 98 98 - 107 mmol/L    CO2 19.0 (L) 22.0 - 29.0 mmol/L    Calcium 9.7 8.6 - 10.5 mg/dL    Total Protein 8.6 (H) 6.0 - 8.5 g/dL    Albumin 3.5 3.5 - 5.2 g/dL    ALT (SGPT) 51 (H) 1 - 41 U/L    AST (SGOT) 68 (H) 1 - 40 U/L    Alkaline Phosphatase 1,431 (H) 39 - 117 U/L    Total Bilirubin 1.1 0.0 - 1.2 mg/dL    Globulin 5.1 gm/dL    A/G Ratio 0.7 g/dL    BUN/Creatinine Ratio 10.0 7.0 - 25.0    Anion Gap 14.0 5.0 - 15.0 mmol/L    eGFR 24.6 (L) >60.0 mL/min/1.73   Lipase    Specimen: Blood   Result Value Ref Range    Lipase 57 13 - 60 U/L   Urinalysis With Microscopic If Indicated (No Culture) - Urine, Clean Catch    Specimen: Urine, Clean Catch   Result Value Ref Range    Color, UA Dark Yellow (A) Yellow, Straw    Appearance, UA Cloudy (A) Clear    pH, UA <=5.0 5.0 - 8.0    Specific Gravity, UA 1.036 (H) 1.001 - 1.030    Glucose, UA Negative Negative    Ketones, UA 15 mg/dL (1+) (A) Negative    Bilirubin, UA Small (1+) (A) Negative    Blood, UA Negative Negative    Protein, UA 30 mg/dL (1+) (A) Negative    Leuk Esterase, UA Trace (A) Negative    Nitrite, UA Negative Negative     Urobilinogen, UA 1.0 E.U./dL 0.2 - 1.0 E.U./dL   CBC Auto Differential    Specimen: Blood   Result Value Ref Range    WBC 6.41 3.40 - 10.80 10*3/mm3    RBC 4.27 4.14 - 5.80 10*6/mm3    Hemoglobin 12.0 (L) 13.0 - 17.7 g/dL    Hematocrit 37.7 37.5 - 51.0 %    MCV 88.3 79.0 - 97.0 fL    MCH 28.1 26.6 - 33.0 pg    MCHC 31.8 31.5 - 35.7 g/dL    RDW 17.3 (H) 12.3 - 15.4 %    RDW-SD 56.4 (H) 37.0 - 54.0 fl    MPV 10.7 6.0 - 12.0 fL    Platelets 402 140 - 450 10*3/mm3    Neutrophil % 47.9 42.7 - 76.0 %    Lymphocyte % 33.7 19.6 - 45.3 %    Monocyte % 15.0 (H) 5.0 - 12.0 %    Eosinophil % 1.7 0.3 - 6.2 %    Basophil % 1.4 0.0 - 1.5 %    Immature Grans % 0.3 0.0 - 0.5 %    Neutrophils, Absolute 3.07 1.70 - 7.00 10*3/mm3    Lymphocytes, Absolute 2.16 0.70 - 3.10 10*3/mm3    Monocytes, Absolute 0.96 (H) 0.10 - 0.90 10*3/mm3    Eosinophils, Absolute 0.11 0.00 - 0.40 10*3/mm3    Basophils, Absolute 0.09 0.00 - 0.20 10*3/mm3    Immature Grans, Absolute 0.02 0.00 - 0.05 10*3/mm3    nRBC 0.0 0.0 - 0.2 /100 WBC   Urinalysis, Microscopic Only - Urine, Clean Catch    Specimen: Urine, Clean Catch   Result Value Ref Range    RBC, UA 0-2 None Seen, 0-2 /HPF    WBC, UA 3-5 (A) None Seen, 0-2 /HPF    Bacteria, UA Trace None Seen, Trace /HPF    Squamous Epithelial Cells, UA 0-2 None Seen, 0-2 /HPF    Hyaline Casts, UA Too Numerous to Count 0 - 6 /LPF    Methodology Manual Light Microscopy    POCT, Creatinine    Specimen: Blood   Result Value Ref Range    Creatinine 3.40 (A) 0.60 - 1.30 mg/dL   POC Creatinine    Specimen: Venous Blood   Result Value Ref Range    Creatinine 3.40 (H) 0.60 - 1.30 mg/dL   ECG 12 Lead Electrolyte Imbalance   Result Value Ref Range    QT Interval 428 ms    QTC Interval 448 ms   Green Top (Gel)   Result Value Ref Range    Extra Tube Hold for add-ons.    Lavender Top   Result Value Ref Range    Extra Tube hold for add-on    Gold Top - SST   Result Value Ref Range    Extra Tube Hold for add-ons.    Gray Top    Result Value Ref Range    Extra Tube Hold for add-ons.    Light Blue Top   Result Value Ref Range    Extra Tube Hold for add-ons.         If labs were ordered, I independently reviewed the results and considered them in treating the patient.      EMERGENCY DEPARTMENT COURSE and DIFFERENTIAL DIAGNOSIS/MDM:   Vitals:  AS OF 22:44 EDT    BP - 106/70  HR - 79  TEMP - 97.2 °F (36.2 °C)  O2 SATS - 100%        Discussion below represents my analysis of pertinent findings related to patient's condition, differential diagnosis, treatment plan and final disposition.      Differential diagnosis:  The differential diagnosis associated with the patient's presentation includes: Acute pancreatitis, pancreatic pseudocyst, biliary pathology, gastritis, peptic ulcer disease      Independent interpretations (ECG/rhythm strip/X-ray/US/CT scan): I independently interpreted the patient's abdominal CT and see no evidence of obstructive change.  I independently interpreted the patient's cardiac monitor which showed sinus rhythm.      Additional sources:  Discussed/obtained information from independent historians:   [] Spouse:   [] Parent:   [] Friend:   [] EMS:   [] Other:  External (non-ED) record review:   [] Inpatient record:   [] Office record:   [] Outpatient record:   [] Prior Outpatient labs:   [] Prior Outpatient radiology:   [] Primary Care record:   [] Outside ED record:   [x] Other: I reviewed prior records.  Patient admitted in May for an episode of alcoholic pancreatitis.  He presented here on the 17th of this month with ongoing abdominal pain and vomiting.  CT scan at that point showed some large pancreatic pseudocyst.      Patient's care impacted by:   [] Diabetes   [] Hypertension   [] Coronary Artery Disease   [] Cancer   [x] Other: History of alcohol abuse    Care significantly affected by Social Determinants of Health (housing and economic circumstances, unemployment)    [] Yes     [x] No   If yes, Patient's care  significantly limited by  Social Determinants of Health including:    [] Inadequate housing    [] Low income    [] Alcoholism and drug addiction in family    [] Problems related to primary support group    [] Unemployment    [] Problems related to employment    [] Other Social Determinants of Health:       Consideration of admission/observation vs discharge: This patient presents with significant pancreatic pseudocyst causing some mass effect on nearby structures and potentially causing mechanical obstruction/vomiting.  After discussion with our hospitalist and GI physician, it was felt the patient warranted transfer to higher level of care for management.      ED Course:    ED Course as of 06/21/24 2244 Fri Jun 21, 2024   1507 Patient presents with ongoing upper abdominal pain and vomiting. Recent admission for alcoholic pancreatitis and has known pseudocyst. Also previously identified IPMN on MRCP which was performed for biliary/pancreatic duct dilatation. He reports that he still cannot tolerate solid food. Clinically appears very dry pressure was little soft on arrival. Has an acute kidney injury today with creatinine just over 3. Abdominal CT overall appears stable to previous scan. LFTs are slightly worse including alk phos but bilirubin remains normal. He is currently on his second liter of fluids and blood pressure is improved. [NS]   1507 I discussed case with hospitalist Dr. Cordova.  Discussed history, presentation, workup.  Accepts patient for admission. [NS]   1854 I was able to speak with Dr. Quick on the transfer center at .  Discussed history, presentation, workup.  Says that GI at their facility will need to evaluate the scan to determine if he needs any intervention. [NS]   1959 I discussed case with transfer physician at  Dr. Guadalupe.  I discussed history, presentation, workup.  Accepts patient for transfer to the [facility] emergency department. [NS]      ED Course User Index  [NS] Brendan  Michael SCHULTZ MD           PROCEDURES:  Procedures    CRITICAL CARE TIME        FINAL IMPRESSION      1. Acute upper abdominal pain    2. Nausea and vomiting, unspecified vomiting type    3. Acute kidney injury    4. Hyperkalemia    5. Pancreatic pseudocyst          DISPOSITION/PLAN     ED Disposition       ED Disposition   Transfer to Another Facility     Condition   --    Comment   Level of Care: Telemetry [5]  Diagnosis: Pancreatic pseudocyst [202658]  Admitting Physician: NARAYAN CORREA [[id number]]  Attending Physician: NARAYAN CORREA [[id number]]  Certification: I Certify That Inpatient Hospital Services Are Medically Necessary For  Greater Than 2 Midnights                   Comment: Please note this report has been produced using speech recognition software.      Michael Cardona MD  Attending Emergency Physician             Michael Cardona MD  06/21/24 1456

## 2024-06-21 NOTE — Clinical Note
Level of Care: Telemetry [5]   Diagnosis: Pancreatic pseudocyst [202658]   Admitting Physician: NARAYAN CORREA [[id number]]   Attending Physician: NARAYAN CORREA [[id number]]   Certification: I Certify That Inpatient Hospital Services Are Medically Necessary For Greater Than 2 Midnights

## 2024-06-21 NOTE — ED NOTES
Terrence James    Nursing Report ED to Floor:  Mental status: a/o4  Ambulatory status: asist x 1  Oxygen Therapy:  room air  Cardiac Rhythm: nsr/sinus channing  Admitted from: home  Safety Concerns:  increased fall risk from baseline  Social Issues: none  ED Room #:  04    ED Nurse Phone Extension - [phone] or may call [phone].      HPI:   Chief Complaint   Patient presents with    Abdominal Pain    Difficulty Urinating    Vomiting       Past Medical History:  Past Medical History:   Diagnosis Date    Anxiety     COVID-19 08/2021    DVT (deep venous thrombosis)     LEFT LEG     Hypertension     Wears reading eyeglasses         Past Surgical History:  Past Surgical History:   Procedure Laterality Date    CARDIAC CATHETERIZATION  08/2019    CORONARY STENT PLACEMENT  08/2019    EAR TUBES Bilateral 2012    FEMORAL THROMBECTOMY/EMBOLECTOMY Left 10/20/2021    Procedure: ULTRASOUND GUIDED ACCESS; LEFT LEG VENOGRAM; INTRAVENOUS ULTRASOUND OF FEMORAL VEIN, POPLITEAL VEIN, EXTERNAL AND COMMON ILIAC VEINS; LEFT POPLITEAL VEIN ANGIOPLASTY; LEFT COMMON ILIAC VEIN ANGIOPLASTY; POPLITEAL AND FEMORAL VEIN THROMBECTOMY - LEFT;  Surgeon: Arnel Gordon MD;  Location: [facility];  Service: Vascular;  Laterality: Left;  FLUORO:3 MIN 48 SEC  DOSE: 45 MGY  CONTRAS    GASTRIC SLEEVE LAPAROSCOPIC  03/2014    LUMBAR DISCECTOMY  2006    L3-4    VASECTOMY          Admitting Doctor:   No admitting provider for patient encounter.    Consulting Provider(s):  Consults       Date and Time Order Name Status Description    5/29/2024  6:00 PM Inpatient Neurology Consult General Completed              Admitting Diagnosis:   There were no encounter diagnoses.    Most Recent Vitals:   Vitals:    06/21/24 1400 06/21/24 1415 06/21/24 1430 06/21/24 1445   BP: 98/65 92/67 106/69 106/77   BP Location:       Patient Position:       Pulse: 65 67 62 59   Resp:       Temp:       TempSrc:       SpO2: 96% 95% 96% 99%   Weight:       Height:           Active LDAs/IV  Access:   Lines, Drains & Airways       Active LDAs       Name Placement date Placement time Site Days    Peripheral IV 06/21/24 1338 Anterior;Distal;Left;Upper Arm 06/21/24  1338  Arm  less than 1                    Labs (abnormal labs have a star):   Labs Reviewed   COMPREHENSIVE METABOLIC PANEL - Abnormal; Notable for the following components:       Result Value    Glucose 183 (*)     BUN 30 (*)     Creatinine 3.01 (*)     Sodium 131 (*)     Potassium 5.7 (*)     CO2 19.0 (*)     Total Protein 8.6 (*)     ALT (SGPT) 51 (*)     AST (SGOT) 68 (*)     Alkaline Phosphatase 1,431 (*)     eGFR 24.6 (*)     All other components within normal limits    Narrative:     GFR Normal >60  Chronic Kidney Disease <60  Kidney Failure <15     URINALYSIS W/ MICROSCOPIC IF INDICATED (NO CULTURE) - Abnormal; Notable for the following components:    Color, UA Dark Yellow (*)     Appearance, UA Cloudy (*)     Specific Gravity, UA 1.036 (*)     Ketones, UA 15 mg/dL (1+) (*)     Bilirubin, UA Small (1+) (*)     Protein, UA 30 mg/dL (1+) (*)     Leuk Esterase, UA Trace (*)     All other components within normal limits   CBC WITH AUTO DIFFERENTIAL - Abnormal; Notable for the following components:    Hemoglobin 12.0 (*)     RDW 17.3 (*)     RDW-SD 56.4 (*)     Monocyte % 15.0 (*)     Monocytes, Absolute 0.96 (*)     All other components within normal limits   URINALYSIS, MICROSCOPIC ONLY - Abnormal; Notable for the following components:    WBC, UA 3-5 (*)     All other components within normal limits   POCT CREATININE - Abnormal; Notable for the following components:    Creatinine 3.40 (*)     All other components within normal limits   POCT CREATININE - Abnormal; Notable for the following components:    Creatinine 3.40 (*)     All other components within normal limits   LIPASE - Normal   RAINBOW DRAW    Narrative:     The following orders were created for panel order [place] Draw.  Procedure                               Abnormality          Status                     ---------                               -----------         ------                     Green Top (Gel)[[id number]]                                  Final result               Lavender Top[[id number]]                                     Final result               Gold Top - SST[[id number]]                                   Final result               Gray Top[[id number]]                                         Final result               Light Blue Top[[id number]]                                   Final result                 Please view results for these tests on the individual orders.   CBC AND DIFFERENTIAL    Narrative:     The following orders were created for panel order CBC & Differential.  Procedure                               Abnormality         Status                     ---------                               -----------         ------                     CBC Auto Differential[[id number]]        Abnormal            Final result                 Please view results for these tests on the individual orders.   GREEN TOP   LAVENDER TOP   GOLD TOP - SST   GRAY TOP   LIGHT BLUE TOP       Meds Given in ED:   Medications   Sodium Chloride (PF) 0.9 % 10 mL (has no administration in time range)   sodium chloride 0.9 % bolus 1,000 mL (1,000 mL Intravenous New Bag 6/21/24 1442)   sodium chloride 0.9 % bolus 1,000 mL (1,000 mL Intravenous New Bag 6/21/24 1338)   famotidine (PEPCID) injection 20 mg (20 mg Intravenous Given 6/21/24 1341)   droperidol (INAPSINE) injection 2.5 mg (2.5 mg Intravenous Given 6/21/24 1340)   Morphine sulfate (PF) injection 4 mg (4 mg Intravenous Given 6/21/24 1444)           Last NIH score:                                                          Dysphagia screening results:        Barber Coma Scale:  No data recorded     CIWA:        Restraint Type:            Isolation Status:  No active isolations

## 2024-06-24 DIAGNOSIS — K21.9 GASTROESOPHAGEAL REFLUX DISEASE, UNSPECIFIED WHETHER ESOPHAGITIS PRESENT: ICD-10-CM

## 2024-06-24 NOTE — TELEPHONE ENCOUNTER
Rx Refill Note  Requested Prescriptions     Pending Prescriptions Disp Refills    omeprazole (priLOSEC) 40 MG capsule [Pharmacy Med Name: OMEPRAZOLE 40MG CAPSULES] 30 capsule 2     Sig: TAKE 1 CAPSULE BY MOUTH DAILY      Last office visit with prescribing clinician: 9/8/2023   Last telemedicine visit with prescribing clinician: Visit date not found   Next office visit with prescribing clinician: Visit date not found                         Would you like a call back once the refill request has been completed: [] Yes [] No    If the office needs to give you a call back, can they leave a voicemail: [] Yes [] No    Trupti Sorensen MA  06/24/24, 10:04 EDT

## 2024-06-25 RX ORDER — OMEPRAZOLE 40 MG/1
40 CAPSULE, DELAYED RELEASE ORAL DAILY
Qty: 30 CAPSULE | Refills: 2 | Status: SHIPPED | OUTPATIENT
Start: 2024-06-25

## 2024-07-03 DIAGNOSIS — F32.A ANXIETY AND DEPRESSION: ICD-10-CM

## 2024-07-03 DIAGNOSIS — F41.9 ANXIETY AND DEPRESSION: ICD-10-CM

## 2024-07-04 LAB
QT INTERVAL: 428 MS
QTC INTERVAL: 448 MS

## 2024-07-05 ENCOUNTER — OFFICE VISIT (OUTPATIENT)
Dept: FAMILY MEDICINE CLINIC | Facility: CLINIC | Age: 49
End: 2024-07-05
Payer: COMMERCIAL

## 2024-07-05 VITALS
BODY MASS INDEX: 30.97 KG/M2 | RESPIRATION RATE: 18 BRPM | OXYGEN SATURATION: 97 % | SYSTOLIC BLOOD PRESSURE: 106 MMHG | WEIGHT: 221.2 LBS | TEMPERATURE: 98.6 F | HEART RATE: 58 BPM | HEIGHT: 71 IN | DIASTOLIC BLOOD PRESSURE: 62 MMHG

## 2024-07-05 DIAGNOSIS — I81 PORTAL VEIN THROMBOSIS: Primary | ICD-10-CM

## 2024-07-05 DIAGNOSIS — K86.3 PANCREATIC PSEUDOCYST: ICD-10-CM

## 2024-07-05 DIAGNOSIS — I10 HYPERTENSION, UNSPECIFIED TYPE: ICD-10-CM

## 2024-07-05 DIAGNOSIS — K86.3 PANCREATIC PSEUDOCYST: Primary | ICD-10-CM

## 2024-07-05 DIAGNOSIS — F10.20 UNCOMPLICATED ALCOHOL DEPENDENCE: ICD-10-CM

## 2024-07-05 RX ORDER — APIXABAN 5 MG (74)
5 KIT ORAL EVERY 12 HOURS
COMMUNITY
Start: 2024-07-02

## 2024-07-05 RX ORDER — BUSPIRONE HYDROCHLORIDE 15 MG/1
15 TABLET ORAL 2 TIMES DAILY
Qty: 60 TABLET | Refills: 2 | Status: SHIPPED | OUTPATIENT
Start: 2024-07-05

## 2024-07-05 RX ORDER — VALSARTAN 160 MG/1
160 TABLET ORAL DAILY
Qty: 30 TABLET | Refills: 1 | Status: SHIPPED | OUTPATIENT
Start: 2024-07-05

## 2024-07-05 RX ORDER — METRONIDAZOLE 500 MG/1
500 TABLET ORAL 2 TIMES DAILY
COMMUNITY
Start: 2024-07-02

## 2024-07-05 RX ORDER — OXYCODONE HYDROCHLORIDE 5 MG/1
5 TABLET ORAL EVERY 12 HOURS PRN
Qty: 30 TABLET | Refills: 0 | Status: SHIPPED | OUTPATIENT
Start: 2024-07-05 | End: 2024-07-05

## 2024-07-05 RX ORDER — METFORMIN HYDROCHLORIDE 500 MG/1
500 TABLET, EXTENDED RELEASE ORAL 2 TIMES DAILY
COMMUNITY

## 2024-07-05 RX ORDER — OXYCODONE HYDROCHLORIDE 5 MG/1
5 TABLET ORAL EVERY 12 HOURS PRN
Qty: 30 TABLET | Refills: 0 | Status: SHIPPED | OUTPATIENT
Start: 2024-07-05

## 2024-07-05 RX ORDER — ONDANSETRON 4 MG/1
4 TABLET, ORALLY DISINTEGRATING ORAL EVERY 8 HOURS PRN
Qty: 30 TABLET | Refills: 1 | Status: SHIPPED | OUTPATIENT
Start: 2024-07-05

## 2024-07-05 RX ORDER — CIPROFLOXACIN 500 MG/1
TABLET, FILM COATED ORAL
COMMUNITY
Start: 2024-07-02

## 2024-07-05 RX ORDER — OXYCODONE HYDROCHLORIDE 5 MG/1
5 TABLET ORAL
COMMUNITY
Start: 2024-07-02 | End: 2024-07-05

## 2024-07-05 NOTE — PROGRESS NOTES
Established Patient Office Visit        Subjective      Chief Complaint:  Hospital Follow Up Visit      History of Present Illness: Terrence James is a 48 y.o. male who presents for f/u of pancreatic pseudocyst.     In past couple days with continued pain, nausea, unable to tolerate foods without vomiting. Half boiled egg Feels about the same as the day of discharge,.     No fevers.   Blood pressure lower than normal.     Patient initially developed pancreatitis May 29 he was admitted to [facility].  Then developed pancreatic pseudocyst and was admitted to  6/21.  He had some associated possible venous occlusion as well to the splenic vein patient underwent ERCP on 6/27 and pancreatic duct stent was placed.  No CBD duct was placed.  He was then transferred to [facility] for further intervention and [place].  Cholangiogram was performed on 7/1 and common bile duct stent was placed at that time as well. Discharged 7/2.     He does describe loose stools more than expected for the amount of food he is eating.  No foul odor not tarry black no blood visible    7/2  Hgb 9.5   WBC count 4.8  Magnesium was low   Calcium was low.   Patient Active Problem List   Diagnosis    Anxiety and depression    DVT (deep venous thrombosis)    Coronary artery disease involving native coronary artery of native heart without angina pectoris    Tinnitus aurium, bilateral    Hypertension    Current moderate episode of major depressive disorder without prior episode    Well adult exam    Vitamin D deficiency    Colon cancer screening    Elevated serum creatinine    Gastroesophageal reflux disease    Annual physical exam    Low testosterone    Type 2 diabetes mellitus with hyperglycemia, without long-term current use of insulin    Uncomplicated alcohol dependence    Combined arterial insufficiency and corporo-venous occlusive erectile dysfunction    Hyperlipidemia LDL goal <70    Ataxia    Pancreatic pseudocyst    Portal vein  thrombosis         Current Outpatient Medications:     Blood Glucose Monitoring Suppl (FreeStyle Lite) device, Use 1 Device Daily., Disp: 1 each, Rfl: 0    buPROPion XL (WELLBUTRIN XL) 300 MG 24 hr tablet, Take 1 tablet by mouth Daily., Disp: 30 tablet, Rfl: 5    busPIRone (BUSPAR) 15 MG tablet, TAKE 1 TABLET BY MOUTH TWICE DAILY, Disp: 60 tablet, Rfl: 2    ciprofloxacin (CIPRO) 500 MG tablet, , Disp: , Rfl:     dicyclomine (BENTYL) 20 MG tablet, Take 1 tablet by mouth Every 8 (Eight) Hours As Needed for Abdominal Cramping., Disp: 20 tablet, Rfl: 0    Eliquis DVT/PE Starter Pack tablet therapy pack, Take two 5 mg tablets by mouth every 12 hours for 7 days. Followed by one 5 mg tablet every 12 hours. (Dispense starter pack if available), Disp: , Rfl:     glucose blood (FREESTYLE LITE) test strip, 1 each by Other route As Needed (to check blood sugar). Use as instructed, Disp: 100 each, Rfl: 0    hydrOXYzine (ATARAX) 25 MG tablet, Take 2 tablets by mouth Every 6 (Six) Hours As Needed for Anxiety., Disp: , Rfl:     Lancets (freestyle) lancets, 1 each by Other route As Needed (to check blood sugar). Use as instructed, Disp: 100 each, Rfl: 0    metoprolol tartrate (LOPRESSOR) 50 MG tablet, Take 1 tablet by mouth 2 (Two) Times a Day., Disp: 180 tablet, Rfl: 3    metroNIDAZOLE (FLAGYL) 500 MG tablet, Take 1 tablet by mouth 2 (Two) Times a Day., Disp: , Rfl:     omeprazole (priLOSEC) 40 MG capsule, TAKE 1 CAPSULE BY MOUTH DAILY, Disp: 30 capsule, Rfl: 2    Pancrelipase, Lip-Prot-Amyl, (Creon) 71108-499086 units capsule delayed-release particles capsule, Take 1 capsule by mouth 3 (Three) Times a Day With Meals., Disp: 180 capsule, Rfl: 0    rosuvastatin (CRESTOR) 5 MG tablet, TAKE 1 TABLET BY MOUTH DAILY, Disp: 90 tablet, Rfl: 0    metFORMIN ER (GLUCOPHAGE-XR) 500 MG 24 hr tablet, Take 1 tablet by mouth 2 (Two) Times a Day., Disp: , Rfl:     ondansetron ODT (ZOFRAN-ODT) 4 MG disintegrating tablet, Place 1 tablet on the  "tongue Every 8 (Eight) Hours As Needed for Nausea or Vomiting., Disp: 30 tablet, Rfl: 1    oxyCODONE (Roxicodone) 5 MG immediate release tablet, Take 1 tablet by mouth Every 12 (Twelve) Hours As Needed for Moderate Pain., Disp: 30 tablet, Rfl: 0    valsartan (DIOVAN) 160 MG tablet, Take 1 tablet by mouth Daily. Taek if systolic increases above 140, Disp: 30 tablet, Rfl: 1       Objective     Physical Exam:   Vital Signs:   /62   Pulse 58   Temp 98.6 °F (37 °C) (Temporal)   Resp 18   Ht 180.3 cm (70.98\")   Wt 100 kg (221 lb 3.2 oz)   SpO2 97%   BMI 30.87 kg/m²      Physical Exam  Constitutional:       General: He is not in acute distress.     Appearance: He is not ill-appearing.   Cardiovascular:      Rate and Rhythm: Normal rate and regular rhythm.   Pulmonary:      Effort: Pulmonary effort is normal.      Breath sounds: Normal breath sounds.   Neurological:      Mental Status: He is alert.   Psychiatric:         Thought Content: Thought content normal.   Moderate tenderness to the right upper quadrant and right mid quadrant no rebound         Assessment / Plan      Assessment/Plan:   Diagnoses and all orders for this visit:    1. Portal vein thrombosis (Primary)  Assessment & Plan:  Continue Eliquis at 10 twice daily for another couple days then will go down to 5 twice daily.      2. Pancreatic pseudocyst  Assessment & Plan:  Status post necrosectomy, common bile duct stent placement and pancreatic duct stent placement.  Has follow-up with [facility] in about 4 weeks.    This appears stable but we will check a CBC and a CMP today.     Discussed warning signs including unrelenting pain, worsening pain, fever, worsening feeling of unwell would be reasons to seek emergent care    Difficult case and continued pain we discussed risk benefits and alternatives to opioids and we will proceed with continued opioid therapy will continue oxycodone at 5 mg twice daily will try to taper this as we can " I will follow-up with him in 2 weeks.  We discussed risk of opioids including addiction and physical dependency sedation.  He declines Narcan.  Opioid risk slightly increased due to history of alcohol use disorder.  We will monitor closely.  Neal reviewed and appropriate urine drug screen ordered controlled substance agreement signed.    Encouraged to continue bland diet refill Zofran    Orders:  -     Compliance Drug Analysis, Ur - Urine, Clean Catch; Future  -     CBC (No Diff); Future  -     Comprehensive Metabolic Panel; Future  -     ondansetron ODT (ZOFRAN-ODT) 4 MG disintegrating tablet; Place 1 tablet on the tongue Every 8 (Eight) Hours As Needed for Nausea or Vomiting.  Dispense: 30 tablet; Refill: 1  -     Compliance Drug Analysis, Ur - Urine, Clean Catch    3. Uncomplicated alcohol dependence  Assessment & Plan:  Recommend consideration of AA again he feels like alcohol relapse was physically triggered by change in work status and feels like he is doing well at this time    Orders:  -     Compliance Drug Analysis, Ur - Urine, Clean Catch; Future  -     Compliance Drug Analysis, Ur - Urine, Clean Catch    4. Hypertension, unspecified type  -     valsartan (DIOVAN) 160 MG tablet; Take 1 tablet by mouth Daily. Taek if systolic increases above 140  Dispense: 30 tablet; Refill: 1         Stop exforge. And will send in rx for just valsartan to restart if BP >140 systolic.   Only blood pressure for now should be metoprolol    Follow Up:   Return in about 12 days (around 7/17/2024) for Follow-up.    MDM: Complex case requiring increased amount of data reviewed today including 2 hospitalizations, ERCP procedure reports labs management of opioid therapy discussed and risks and benefits of these things  I spent 42 minutes caring for Terrence on this date of service. This time includes time spent by me in the following activities: preparing for the visit, performing a medically appropriate examination and/or  evaluation, counseling and educating the patient/family/caregiver and documenting information in the medical record.      Luis Armando Katz MD  Family Medicine - [facility]

## 2024-07-05 NOTE — ASSESSMENT & PLAN NOTE
Status post necrosectomy, common bile duct stent placement and pancreatic duct stent placement.  Has follow-up with [facility] in about 4 weeks.    This appears stable but we will check a CBC and a CMP today.     Discussed warning signs including unrelenting pain, worsening pain, fever, worsening feeling of unwell would be reasons to seek emergent care    Difficult case and continued pain we discussed risk benefits and alternatives to opioids and we will proceed with continued opioid therapy will continue oxycodone at 5 mg twice daily will try to taper this as we can I will follow-up with him in 2 weeks.  We discussed risk of opioids including addiction and physical dependency sedation.  He declines Narcan.  Opioid risk slightly increased due to history of alcohol use disorder.  We will monitor closely.  Neal reviewed and appropriate urine drug screen ordered controlled substance agreement signed.

## 2024-07-05 NOTE — LETTER
July 5, 2024       No Recipients    Patient: Terrence James   YOB: 1975   Date of Visit: 7/5/2024       Dear Lokesh Leggett MD,    I saw Terrence James in my office as a hospital follow-up from pancreatic pseudocyst.  He seems to be stable but continues to have low tolerability of oral intake.  Pain stable and remains present.  Not ill-appearing on exam.  I discussed warning signs to monitor and seek care for.  I am checking a CBC and CMP today.    If you have questions, please do not hesitate to call me. I look forward to following Terrence along with you.         Sincerely,        Luis Armando Katz MD        CC:   No Recipients      Established Patient Office Visit        Subjective      Chief Complaint:  Hospital Follow Up Visit      History of Present Illness: Terrence James is a 48 y.o. male who presents for f/u of pancreatic pseudocyst.     In past couple days with continued pain, nausea, unable to tolerate foods without vomiting. Half boiled egg Feels about the same as the day of discharge,.     No fevers.   Blood pressure lower than normal.     Patient initially developed pancreatitis May 29 he was admitted to [facility].  Then developed pancreatic pseudocyst and was admitted to  6/21.  He had some associated possible venous occlusion as well to the splenic vein patient underwent ERCP on 6/27 and pancreatic duct stent was placed.  No CBD duct was placed.  He was then transferred to [facility] for further intervention and [place].  Cholangiogram was performed on 7/1 and common bile duct stent was placed at that time as well. Discharged 7/2.     He does describe loose stools more than expected for the amount of food he is eating.  No foul odor not tarry black no blood visible    7/2  Hgb 9.5   WBC count 4.8  Magnesium was low   Calcium was low.   Patient Active Problem List   Diagnosis   • Anxiety and depression   • DVT (deep venous thrombosis)   • Coronary artery disease involving  native coronary artery of native heart without angina pectoris   • Tinnitus aurium, bilateral   • Hypertension   • Current moderate episode of major depressive disorder without prior episode   • Well adult exam   • Vitamin D deficiency   • Colon cancer screening   • Elevated serum creatinine   • Gastroesophageal reflux disease   • Annual physical exam   • Low testosterone   • Type 2 diabetes mellitus with hyperglycemia, without long-term current use of insulin   • Uncomplicated alcohol dependence   • Combined arterial insufficiency and corporo-venous occlusive erectile dysfunction   • Hyperlipidemia LDL goal <70   • Ataxia   • Pancreatic pseudocyst   • Portal vein thrombosis         Current Outpatient Medications:   •  Blood Glucose Monitoring Suppl (FreeStyle Lite) device, Use 1 Device Daily., Disp: 1 each, Rfl: 0  •  buPROPion XL (WELLBUTRIN XL) 300 MG 24 hr tablet, Take 1 tablet by mouth Daily., Disp: 30 tablet, Rfl: 5  •  busPIRone (BUSPAR) 15 MG tablet, TAKE 1 TABLET BY MOUTH TWICE DAILY, Disp: 60 tablet, Rfl: 2  •  ciprofloxacin (CIPRO) 500 MG tablet, , Disp: , Rfl:   •  dicyclomine (BENTYL) 20 MG tablet, Take 1 tablet by mouth Every 8 (Eight) Hours As Needed for Abdominal Cramping., Disp: 20 tablet, Rfl: 0  •  Eliquis DVT/PE Starter Pack tablet therapy pack, Take two 5 mg tablets by mouth every 12 hours for 7 days. Followed by one 5 mg tablet every 12 hours. (Dispense starter pack if available), Disp: , Rfl:   •  glucose blood (FREESTYLE LITE) test strip, 1 each by Other route As Needed (to check blood sugar). Use as instructed, Disp: 100 each, Rfl: 0  •  hydrOXYzine (ATARAX) 25 MG tablet, Take 2 tablets by mouth Every 6 (Six) Hours As Needed for Anxiety., Disp: , Rfl:   •  Lancets (freestyle) lancets, 1 each by Other route As Needed (to check blood sugar). Use as instructed, Disp: 100 each, Rfl: 0  •  metoprolol tartrate (LOPRESSOR) 50 MG tablet, Take 1 tablet by mouth 2 (Two) Times a Day., Disp: 180 tablet,  "Rfl: 3  •  metroNIDAZOLE (FLAGYL) 500 MG tablet, Take 1 tablet by mouth 2 (Two) Times a Day., Disp: , Rfl:   •  omeprazole (priLOSEC) 40 MG capsule, TAKE 1 CAPSULE BY MOUTH DAILY, Disp: 30 capsule, Rfl: 2  •  Pancrelipase, Lip-Prot-Amyl, (Creon) 89684-663487 units capsule delayed-release particles capsule, Take 1 capsule by mouth 3 (Three) Times a Day With Meals., Disp: 180 capsule, Rfl: 0  •  rosuvastatin (CRESTOR) 5 MG tablet, TAKE 1 TABLET BY MOUTH DAILY, Disp: 90 tablet, Rfl: 0  •  metFORMIN ER (GLUCOPHAGE-XR) 500 MG 24 hr tablet, Take 1 tablet by mouth 2 (Two) Times a Day., Disp: , Rfl:   •  ondansetron ODT (ZOFRAN-ODT) 4 MG disintegrating tablet, Place 1 tablet on the tongue Every 8 (Eight) Hours As Needed for Nausea or Vomiting., Disp: 30 tablet, Rfl: 1  •  oxyCODONE (Roxicodone) 5 MG immediate release tablet, Take 1 tablet by mouth Every 12 (Twelve) Hours As Needed for Moderate Pain., Disp: 30 tablet, Rfl: 0  •  valsartan (DIOVAN) 160 MG tablet, Take 1 tablet by mouth Daily. Taek if systolic increases above 140, Disp: 30 tablet, Rfl: 1       Objective     Physical Exam:   Vital Signs:   /62   Pulse 58   Temp 98.6 °F (37 °C) (Temporal)   Resp 18   Ht 180.3 cm (70.98\")   Wt 100 kg (221 lb 3.2 oz)   SpO2 97%   BMI 30.87 kg/m²      Physical Exam  Constitutional:       General: He is not in acute distress.     Appearance: He is not ill-appearing.   Cardiovascular:      Rate and Rhythm: Normal rate and regular rhythm.   Pulmonary:      Effort: Pulmonary effort is normal.      Breath sounds: Normal breath sounds.   Neurological:      Mental Status: He is alert.   Psychiatric:         Thought Content: Thought content normal.   Moderate tenderness to the right upper quadrant and right mid quadrant no rebound         Assessment / Plan      Assessment/Plan:   Diagnoses and all orders for this visit:    1. Portal vein thrombosis (Primary)  Assessment & Plan:  Continue Eliquis at 10 twice daily for another " couple days then will go down to 5 twice daily.      2. Pancreatic pseudocyst  Assessment & Plan:  Status post necrosectomy, common bile duct stent placement and pancreatic duct stent placement.  Has follow-up with [facility] in about 4 weeks.    This appears stable but we will check a CBC and a CMP today.     Discussed warning signs including unrelenting pain, worsening pain, fever, worsening feeling of unwell would be reasons to seek emergent care    Difficult case and continued pain we discussed risk benefits and alternatives to opioids and we will proceed with continued opioid therapy will continue oxycodone at 5 mg twice daily will try to taper this as we can I will follow-up with him in 2 weeks.  We discussed risk of opioids including addiction and physical dependency sedation.  He declines Narcan.  Opioid risk slightly increased due to history of alcohol use disorder.  We will monitor closely.  Neal reviewed and appropriate urine drug screen ordered controlled substance agreement signed.    Encouraged to continue bland diet refill Zofran    Orders:  -     Compliance Drug Analysis, Ur - Urine, Clean Catch; Future  -     CBC (No Diff); Future  -     Comprehensive Metabolic Panel; Future  -     ondansetron ODT (ZOFRAN-ODT) 4 MG disintegrating tablet; Place 1 tablet on the tongue Every 8 (Eight) Hours As Needed for Nausea or Vomiting.  Dispense: 30 tablet; Refill: 1  -     Compliance Drug Analysis, Ur - Urine, Clean Catch    3. Uncomplicated alcohol dependence  Assessment & Plan:  Recommend consideration of AA again he feels like alcohol relapse was physically triggered by change in work status and feels like he is doing well at this time    Orders:  -     Compliance Drug Analysis, Ur - Urine, Clean Catch; Future  -     Compliance Drug Analysis, Ur - Urine, Clean Catch    4. Hypertension, unspecified type  -     valsartan (DIOVAN) 160 MG tablet; Take 1 tablet by mouth Daily. Taek if systolic  increases above 140  Dispense: 30 tablet; Refill: 1         Stop exforge. And will send in rx for just valsartan to restart if BP >140 systolic.   Only blood pressure for now should be metoprolol    Follow Up:   Return in about 12 days (around 7/17/2024) for Follow-up.    MDM: Complex case requiring increased amount of data reviewed today including 2 hospitalizations, ERCP procedure reports labs management of opioid therapy discussed and risks and benefits of these things  I spent 42 minutes caring for Terrence on this date of service. This time includes time spent by me in the following activities: preparing for the visit, performing a medically appropriate examination and/or evaluation, counseling and educating the patient/family/caregiver and documenting information in the medical record.      Luis Armando Katz MD  Family Medicine - [facility]

## 2024-07-05 NOTE — PATIENT INSTRUCTIONS
Stop amlodipine-valsartan combo pill  I will send in valsartan 160 mg and start if top number >140 or bottom >90

## 2024-07-05 NOTE — ASSESSMENT & PLAN NOTE
Recommend consideration of AA again he feels like alcohol relapse was physically triggered by change in work status and feels like he is doing well at this time

## 2024-07-16 LAB — DRUGS UR: NORMAL

## 2024-07-17 ENCOUNTER — TELEPHONE (OUTPATIENT)
Dept: FAMILY MEDICINE CLINIC | Facility: CLINIC | Age: 49
End: 2024-07-17
Payer: COMMERCIAL

## 2024-07-17 NOTE — TELEPHONE ENCOUNTER
Reviewed drug screen does show oxazepam but on review of PDMP he does actually have a prescription for this in the past.  Will discuss further on follow-up

## 2024-07-25 ENCOUNTER — OFFICE VISIT (OUTPATIENT)
Dept: FAMILY MEDICINE CLINIC | Facility: CLINIC | Age: 49
End: 2024-07-25
Payer: COMMERCIAL

## 2024-07-25 VITALS
RESPIRATION RATE: 21 BRPM | BODY MASS INDEX: 29.03 KG/M2 | TEMPERATURE: 98.4 F | OXYGEN SATURATION: 97 % | HEIGHT: 71 IN | DIASTOLIC BLOOD PRESSURE: 72 MMHG | SYSTOLIC BLOOD PRESSURE: 100 MMHG | WEIGHT: 207.4 LBS | HEART RATE: 80 BPM

## 2024-07-25 DIAGNOSIS — K86.3 PANCREATIC PSEUDOCYST: ICD-10-CM

## 2024-07-25 DIAGNOSIS — F41.9 ANXIETY AND DEPRESSION: ICD-10-CM

## 2024-07-25 DIAGNOSIS — E11.65 TYPE 2 DIABETES MELLITUS WITH HYPERGLYCEMIA, WITHOUT LONG-TERM CURRENT USE OF INSULIN: ICD-10-CM

## 2024-07-25 DIAGNOSIS — K21.9 GASTROESOPHAGEAL REFLUX DISEASE, UNSPECIFIED WHETHER ESOPHAGITIS PRESENT: ICD-10-CM

## 2024-07-25 DIAGNOSIS — F32.A ANXIETY AND DEPRESSION: ICD-10-CM

## 2024-07-25 DIAGNOSIS — I10 PRIMARY HYPERTENSION: Primary | ICD-10-CM

## 2024-07-25 PROCEDURE — 99214 OFFICE O/P EST MOD 30 MIN: CPT | Performed by: STUDENT IN AN ORGANIZED HEALTH CARE EDUCATION/TRAINING PROGRAM

## 2024-07-25 RX ORDER — ONDANSETRON 4 MG/1
4 TABLET, ORALLY DISINTEGRATING ORAL EVERY 8 HOURS PRN
Qty: 30 TABLET | Refills: 1 | Status: CANCELLED | OUTPATIENT
Start: 2024-07-25

## 2024-07-25 RX ORDER — OMEPRAZOLE 40 MG/1
40 CAPSULE, DELAYED RELEASE ORAL DAILY
Qty: 30 CAPSULE | Refills: 5 | Status: SHIPPED | OUTPATIENT
Start: 2024-07-25

## 2024-07-25 RX ORDER — OMEPRAZOLE 40 MG/1
40 CAPSULE, DELAYED RELEASE ORAL DAILY
Qty: 30 CAPSULE | Refills: 2 | Status: CANCELLED | OUTPATIENT
Start: 2024-07-25

## 2024-07-25 RX ORDER — BUPROPION HYDROCHLORIDE 300 MG/1
300 TABLET ORAL DAILY
Qty: 90 TABLET | Refills: 1 | Status: SHIPPED | OUTPATIENT
Start: 2024-07-25

## 2024-07-25 RX ORDER — HYDROCODONE BITARTRATE AND ACETAMINOPHEN 5; 325 MG/1; MG/1
1 TABLET ORAL EVERY 12 HOURS PRN
Qty: 14 TABLET | Refills: 0 | Status: SHIPPED | OUTPATIENT
Start: 2024-07-25

## 2024-07-25 RX ORDER — ONDANSETRON 4 MG/1
4 TABLET, ORALLY DISINTEGRATING ORAL EVERY 8 HOURS PRN
Qty: 30 TABLET | Refills: 1 | Status: SHIPPED | OUTPATIENT
Start: 2024-07-25

## 2024-07-25 RX ORDER — DICYCLOMINE HCL 20 MG
20 TABLET ORAL EVERY 8 HOURS PRN
Qty: 20 TABLET | Refills: 0 | Status: CANCELLED | OUTPATIENT
Start: 2024-07-25

## 2024-07-25 RX ORDER — OXYCODONE HYDROCHLORIDE 5 MG/1
5 TABLET ORAL EVERY 12 HOURS PRN
Qty: 30 TABLET | Refills: 0 | Status: CANCELLED | OUTPATIENT
Start: 2024-07-25

## 2024-07-25 NOTE — PROGRESS NOTES
Established Patient Office Visit        Subjective      Chief Complaint:  Hypertension      History of Present Illness: Terrence James is a 49 y.o. male who presents for pancreatic pseudocyst.     Feeling much better pains significantly improved blood pressure middle lobe mild to stasis.  Has follow-up procedure planned with GI.    Patient Active Problem List   Diagnosis    Anxiety and depression    DVT (deep venous thrombosis)    Coronary artery disease involving native coronary artery of native heart without angina pectoris    Tinnitus aurium, bilateral    Hypertension    Current moderate episode of major depressive disorder without prior episode    Well adult exam    Vitamin D deficiency    Colon cancer screening    Elevated serum creatinine    Gastroesophageal reflux disease    Annual physical exam    Low testosterone    Type 2 diabetes mellitus with hyperglycemia, without long-term current use of insulin    Uncomplicated alcohol dependence    Combined arterial insufficiency and corporo-venous occlusive erectile dysfunction    Hyperlipidemia LDL goal <70    Ataxia    Pancreatic pseudocyst    Portal vein thrombosis         Current Outpatient Medications:     Blood Glucose Monitoring Suppl (FreeStyle Lite) device, Use 1 Device Daily., Disp: 1 each, Rfl: 0    buPROPion XL (WELLBUTRIN XL) 300 MG 24 hr tablet, Take 1 tablet by mouth Daily., Disp: 90 tablet, Rfl: 1    busPIRone (BUSPAR) 15 MG tablet, TAKE 1 TABLET BY MOUTH TWICE DAILY, Disp: 60 tablet, Rfl: 2    dicyclomine (BENTYL) 20 MG tablet, Take 1 tablet by mouth Every 8 (Eight) Hours As Needed for Abdominal Cramping., Disp: 20 tablet, Rfl: 0    Eliquis DVT/PE Starter Pack tablet therapy pack, Take two 5 mg tablets by mouth every 12 hours for 7 days. Followed by one 5 mg tablet every 12 hours. (Dispense starter pack if available), Disp: , Rfl:     hydrOXYzine (ATARAX) 25 MG tablet, Take 2 tablets by mouth Every 6 (Six) Hours As Needed for Anxiety., Disp: ,  "Rfl:     metFORMIN ER (GLUCOPHAGE-XR) 500 MG 24 hr tablet, Take 1 tablet by mouth 2 (Two) Times a Day., Disp: , Rfl:     metoprolol tartrate (LOPRESSOR) 50 MG tablet, Take 1 tablet by mouth 2 (Two) Times a Day., Disp: 180 tablet, Rfl: 3    omeprazole (priLOSEC) 40 MG capsule, Take 1 capsule by mouth Daily., Disp: 30 capsule, Rfl: 5    ondansetron ODT (ZOFRAN-ODT) 4 MG disintegrating tablet, Place 1 tablet on the tongue Every 8 (Eight) Hours As Needed for Nausea or Vomiting., Disp: 30 tablet, Rfl: 1    Pancrelipase, Lip-Prot-Amyl, (Creon) 51757-636441 units capsule delayed-release particles capsule, Take 1 capsule by mouth 3 (Three) Times a Day With Meals., Disp: 180 capsule, Rfl: 0    rosuvastatin (CRESTOR) 5 MG tablet, TAKE 1 TABLET BY MOUTH DAILY, Disp: 90 tablet, Rfl: 0    valsartan (DIOVAN) 160 MG tablet, Take 1 tablet by mouth Daily. Taek if systolic increases above 140, Disp: 30 tablet, Rfl: 1    glucose blood test strip, Test once daily before breakfast and as needed, Disp: 90 each, Rfl: 3    HYDROcodone-acetaminophen (Norco) 5-325 MG per tablet, Take 1 tablet by mouth Every 12 (Twelve) Hours As Needed for Moderate Pain (pain)., Disp: 14 tablet, Rfl: 0       Objective     Physical Exam:   Vital Signs:   /72   Pulse 80   Temp 98.4 °F (36.9 °C) (Temporal)   Resp 21   Ht 180.3 cm (70.98\")   Wt 94.1 kg (207 lb 6.4 oz)   SpO2 97%   BMI 28.94 kg/m²      Physical Exam  Constitutional:       General: He is not in acute distress.     Appearance: He is not ill-appearing.   Cardiovascular:      Rate and Rhythm: Normal rate and regular rhythm.   Pulmonary:      Effort: Pulmonary effort is normal.      Breath sounds: Normal breath sounds.   Neurological:      Mental Status: He is alert.   Psychiatric:         Thought Content: Thought content normal.   No rebound almost no tenderness to the upper quadrant         Assessment / Plan      Assessment/Plan:   Diagnoses and all orders for this visit:    1. Primary " hypertension (Primary)  Assessment & Plan:  Stop valsartan and continue to hold amlodipine  Continue metoprolol       2. Pancreatic pseudocyst  -     HYDROcodone-acetaminophen (Norco) 5-325 MG per tablet; Take 1 tablet by mouth Every 12 (Twelve) Hours As Needed for Moderate Pain (pain).  Dispense: 14 tablet; Refill: 0  -     ondansetron ODT (ZOFRAN-ODT) 4 MG disintegrating tablet; Place 1 tablet on the tongue Every 8 (Eight) Hours As Needed for Nausea or Vomiting.  Dispense: 30 tablet; Refill: 1  -Has several procedures planned in regards to stopping constant send pancreatic pseudocyst and I will decrease pain medicine down to hydrocodone from oxycodone for this as he has had improvement in his pain.  Take Tylenol as alternative to hydrocodone    3. Type 2 diabetes mellitus with hyperglycemia, without long-term current use of insulin  -     glucose blood test strip; Test once daily before breakfast and as needed  Dispense: 90 each; Refill: 3  -Continue metformin will need to follow-up A1c in about 3 months    4. Gastroesophageal reflux disease, unspecified whether esophagitis present  -     omeprazole (priLOSEC) 40 MG capsule; Take 1 capsule by mouth Daily.  Dispense: 30 capsule; Refill: 5    5. Anxiety and depression  -     buPROPion XL (WELLBUTRIN XL) 300 MG 24 hr tablet; Take 1 tablet by mouth Daily.  Dispense: 90 tablet; Refill: 1           Follow Up:   Return in about 4 weeks (around 8/22/2024) for Follow-up w/ me in 1 month .    MDM:     Luis Armando Katz MD  Family Medicine - [facility]

## 2024-08-14 ENCOUNTER — OFFICE VISIT (OUTPATIENT)
Dept: FAMILY MEDICINE CLINIC | Facility: CLINIC | Age: 49
End: 2024-08-14
Payer: COMMERCIAL

## 2024-08-14 VITALS
DIASTOLIC BLOOD PRESSURE: 64 MMHG | SYSTOLIC BLOOD PRESSURE: 110 MMHG | WEIGHT: 197 LBS | HEIGHT: 71 IN | TEMPERATURE: 97.6 F | BODY MASS INDEX: 27.58 KG/M2 | HEART RATE: 80 BPM | OXYGEN SATURATION: 97 %

## 2024-08-14 DIAGNOSIS — I10 HYPERTENSION, UNSPECIFIED TYPE: ICD-10-CM

## 2024-08-14 DIAGNOSIS — R10.31 RIGHT LOWER QUADRANT PAIN: Primary | ICD-10-CM

## 2024-08-14 DIAGNOSIS — R50.9 FEVER, UNSPECIFIED FEVER CAUSE: ICD-10-CM

## 2024-08-14 DIAGNOSIS — R10.13 MIDEPIGASTRIC PAIN: ICD-10-CM

## 2024-08-14 DIAGNOSIS — I25.10 CORONARY ARTERY DISEASE INVOLVING NATIVE CORONARY ARTERY OF NATIVE HEART WITHOUT ANGINA PECTORIS: ICD-10-CM

## 2024-08-14 LAB
EXPIRATION DATE: NORMAL
EXPIRATION DATE: NORMAL
FLUAV AG NPH QL: NEGATIVE
FLUBV AG NPH QL: NEGATIVE
INTERNAL CONTROL: NORMAL
INTERNAL CONTROL: NORMAL
Lab: NORMAL
Lab: NORMAL
SARS-COV-2 AG UPPER RESP QL IA.RAPID: NOT DETECTED

## 2024-08-14 PROCEDURE — 87804 INFLUENZA ASSAY W/OPTIC: CPT | Performed by: STUDENT IN AN ORGANIZED HEALTH CARE EDUCATION/TRAINING PROGRAM

## 2024-08-14 PROCEDURE — 87426 SARSCOV CORONAVIRUS AG IA: CPT | Performed by: STUDENT IN AN ORGANIZED HEALTH CARE EDUCATION/TRAINING PROGRAM

## 2024-08-14 PROCEDURE — 99214 OFFICE O/P EST MOD 30 MIN: CPT | Performed by: STUDENT IN AN ORGANIZED HEALTH CARE EDUCATION/TRAINING PROGRAM

## 2024-08-14 RX ORDER — SILDENAFIL 25 MG/1
TABLET, FILM COATED ORAL
COMMUNITY
Start: 2024-07-24

## 2024-08-14 RX ORDER — METOPROLOL SUCCINATE 25 MG/1
25 TABLET, EXTENDED RELEASE ORAL DAILY
Qty: 30 TABLET | Refills: 0 | Status: SHIPPED | OUTPATIENT
Start: 2024-08-14

## 2024-08-14 NOTE — PROGRESS NOTES
Office Note     Name: Terrence James    : 1975     MRN: [id number]     Chief Complaint  GI Problem (Pancrease issues/Pt is in a lot of pain sometimes/He wants labs, and pain meds, he states that he is losing weight rapidly, pt states that he isn't able to eat/Running low temps, dry heaving, hot flashes.)    Subjective     History of Present Illness:  Terrence James is a 49 y.o. male who presents today for GI issues.  Patient is here with his fiancée today.  Patient has a history of pancreatic pseudocyst status post necrosectomy and common bile duct placement and pancreatic duct stent placement.  Patient reports that he has been rapidly losing weight as he has not been drinking for the last 3 to 4 months.  He has also had pain from his pancreatitis.  That has improved.  He does follow with Dr. Leggett at [facility] for his pancreatic issues.  Over the last 3 to 4 days, he has had pain in his right lower quadrant and has been febrile.  Has had bodyaches.  Has had a bit of congestion but no cough.  He does report some nausea and poor appetite and these last 3 to 4 days.    He has stopped taking his blood pressure medicine, his valsartan due to low blood pressures and feeling dizzy.  He has been taking his metoprolol tartrate 50mg once daily.               Objective     Past Medical History:   Diagnosis Date    Anxiety     COVID-19 2021    DVT (deep venous thrombosis)     LEFT LEG     Hypertension     Wears reading eyeglasses      Past Surgical History:   Procedure Laterality Date    CARDIAC CATHETERIZATION  2019    CORONARY STENT PLACEMENT  2019    EAR TUBES Bilateral     FEMORAL THROMBECTOMY/EMBOLECTOMY Left 10/20/2021    Procedure: ULTRASOUND GUIDED ACCESS; LEFT LEG VENOGRAM; INTRAVENOUS ULTRASOUND OF FEMORAL VEIN, POPLITEAL VEIN, EXTERNAL AND COMMON ILIAC VEINS; LEFT POPLITEAL VEIN ANGIOPLASTY; LEFT COMMON ILIAC VEIN ANGIOPLASTY; POPLITEAL AND FEMORAL VEIN THROMBECTOMY - LEFT;  Surgeon: Heidi  "MD Arnel;  Location: [facility];  Service: Vascular;  Laterality: Left;  FLUORO:3 MIN 48 SEC  DOSE: 45 MGY  CONTRAS    GASTRIC SLEEVE LAPAROSCOPIC  03/2014    LUMBAR DISCECTOMY  2006    L3-4    VASECTOMY       Family History   Problem Relation Age of Onset    Vision loss Mother     Diabetes Mother     Kidney disease Mother     Skin cancer Mother         squamous cell    Heart attack Father     No Known Problems Sister     No Known Problems Sister     No Known Problems Brother     Other Maternal Grandmother         unknown    Other Maternal Grandfather         unknown    Other Paternal Grandmother         unknown    Other Paternal Grandfather         unknown    No Known Problems Daughter     No Known Problems Son        Vital Signs  /64   Pulse 80   Temp 97.6 °F (36.4 °C) (Infrared)   Ht 180.3 cm (70.98\")   Wt 89.4 kg (197 lb)   SpO2 97%   BMI 27.49 kg/m²   Estimated body mass index is 27.49 kg/m² as calculated from the following:    Height as of this encounter: 180.3 cm (70.98\").    Weight as of this encounter: 89.4 kg (197 lb).    Physical Exam  Vitals reviewed.   Constitutional:       Appearance: Normal appearance.   HENT:      Head: Normocephalic.      Nose: Nose normal.      Mouth/Throat:      Mouth: Mucous membranes are moist.   Eyes:      Conjunctiva/sclera: Conjunctivae normal.   Cardiovascular:      Rate and Rhythm: Normal rate and regular rhythm.   Pulmonary:      Effort: Pulmonary effort is normal.      Breath sounds: Normal breath sounds.   Abdominal:      General: Abdomen is flat.      Comments: Tender to palpation right lower quadrant, midepigastric area and left upper quadrant area   Neurological:      Mental Status: He is alert.               Assessment and Plan     Diagnoses and all orders for this visit:    1. Right lower quadrant pain (Primary)    2. Midepigastric pain    3. Fever, unspecified fever cause  -     POC Influenza A / B  -     POCT SARS-CoV-2 Antigen    4. Coronary " artery disease involving native coronary artery of native heart without angina pectoris  -     metoprolol succinate XL (Toprol XL) 25 MG 24 hr tablet; Take 1 tablet by mouth Daily.  Dispense: 30 tablet; Refill: 0    5. Hypertension, unspecified type  -     metoprolol succinate XL (Toprol XL) 25 MG 24 hr tablet; Take 1 tablet by mouth Daily.  Dispense: 30 tablet; Refill: 0      Patient with pain to palpation in his right lower quadrant.  Because of his symptoms of nausea, poor appetite and fever the last several days, along with his persistent pain in his right lower quadrant, I would like to rule out appendicitis.  He also does have some midepigastric pain to palpation.  With his significant history of pancreatic abnormalities, would like for pancreas to also be evaluated.  I advised patient go to the ER immediately to get a CT scan of his abdomen and pelvis.  Patient reported that he will be going to [facility] ER. I called them with report.  I would like for him to get stat labs there as well.      We did do a flu and COVID test given his body aches and fever and both of those were negative.  If the CT scan is unremarkable, then it is very possible that he does have a viral syndrome of some sort.  This would need to be further evaluated once more information is gathered.    He has been running hypotensive and has stopped his valsartan for about 6 weeks.  I do recommend holding off on the valsartan.  We will decrease his metoprolol to 25 mg daily.  Advised patient to follow-up as scheduled.    Follow Up  No follow-ups on file.    Laurita Rouse MD

## 2024-09-05 ENCOUNTER — TELEPHONE (OUTPATIENT)
Dept: FAMILY MEDICINE CLINIC | Facility: CLINIC | Age: 49
End: 2024-09-05
Payer: COMMERCIAL

## 2024-09-05 NOTE — TELEPHONE ENCOUNTER
Caller: Terrence James    Relationship: Self    Best call back number: [phone]     What orders are you requesting (i.e. lab or imaging): FULL PANEL DRUG SCREEN    In what timeframe would the patient need to come in: 09/06/24    Where will you receive your lab/imaging services: [facility]    Additional notes: PLEASE CALL PATIENT ONCE THE ORDERS ARE IN THE SYSTEM

## 2024-09-06 ENCOUNTER — CLINICAL SUPPORT (OUTPATIENT)
Dept: FAMILY MEDICINE CLINIC | Facility: CLINIC | Age: 49
End: 2024-09-06
Payer: COMMERCIAL

## 2024-09-06 ENCOUNTER — TELEPHONE (OUTPATIENT)
Dept: FAMILY MEDICINE CLINIC | Facility: CLINIC | Age: 49
End: 2024-09-06
Payer: COMMERCIAL

## 2024-09-06 DIAGNOSIS — Z51.81 THERAPEUTIC DRUG MONITORING: Primary | ICD-10-CM

## 2024-09-06 DIAGNOSIS — Z51.81 THERAPEUTIC DRUG MONITORING: ICD-10-CM

## 2024-09-06 NOTE — TELEPHONE ENCOUNTER
"Hub relay: \"it appears you cancelled your last appt can you please call to reschedule this and we can request provider for this at appt then, thanks\" lm for pt to call us back.  "

## 2024-09-06 NOTE — TELEPHONE ENCOUNTER
Name: Terrence James      Relationship: Self      Best Callback Number: [phone]       [facility] PROVIDED THE RELAY MESSAGE FROM THE OFFICE      PATIENT: HAS FURTHER QUESTIONS AND WOULD LIKE A CALL BACK AT THE FOLLOWING PHONE [phone]    ADDITIONAL INFORMATION: KOBI TO READ WAS READ AND HE WANTS A CALLBACK TODAY. HE STATED THE REASON THE APPT WAS CANCELLED PER RESPONSE WAS HE WAS IN THE WEEK BEFORE. THE DRUG TEST IS FOR EMPLOYER AS HE WANTS TO MAKE SURE THE MEDICATION HYDROCODONE THAT WAS PRESCRIBED BY THE OFFICE IS OUT OF HIS SYSTEM. CALL PATIENT BACK TODAY PLEASE

## 2024-09-12 ENCOUNTER — TELEPHONE (OUTPATIENT)
Dept: FAMILY MEDICINE CLINIC | Facility: CLINIC | Age: 49
End: 2024-09-12
Payer: COMMERCIAL

## 2024-09-12 NOTE — TELEPHONE ENCOUNTER
Caller: Terrence James    Relationship: Self    Best call back number:   Telephone Information:   Mobile [phone]     Caller requesting test results: YES    What test was performed: DRUG SCREENING    When was the test performed: FRIDAY 09/06/2024    Where was the test performed: IN OFFICE    Additional notes: PLEASE CALL WITH RESULTS

## 2024-09-14 LAB — DRUGS UR: NORMAL

## 2024-09-18 ENCOUNTER — FLU SHOT (OUTPATIENT)
Dept: FAMILY MEDICINE CLINIC | Facility: CLINIC | Age: 49
End: 2024-09-18
Payer: COMMERCIAL

## 2024-09-18 DIAGNOSIS — Z23 IMMUNIZATION DUE: Primary | ICD-10-CM

## 2024-09-18 DIAGNOSIS — Z11.1 SCREENING-PULMONARY TB: ICD-10-CM

## 2024-09-18 DIAGNOSIS — Z11.1 SCREENING-PULMONARY TB: Primary | ICD-10-CM

## 2024-09-20 ENCOUNTER — CLINICAL SUPPORT (OUTPATIENT)
Dept: FAMILY MEDICINE CLINIC | Facility: CLINIC | Age: 49
End: 2024-09-20
Payer: COMMERCIAL

## 2024-09-20 DIAGNOSIS — Z11.1 SCREENING-PULMONARY TB: Primary | ICD-10-CM

## 2024-09-20 LAB
INDURATION: 0 MM (ref 0–10)
Lab: NORMAL
Lab: NORMAL
TB SKIN TEST: NEGATIVE

## 2024-09-27 ENCOUNTER — CLINICAL SUPPORT (OUTPATIENT)
Dept: FAMILY MEDICINE CLINIC | Facility: CLINIC | Age: 49
End: 2024-09-27
Payer: COMMERCIAL

## 2024-09-27 DIAGNOSIS — Z23 IMMUNIZATION DUE: Primary | ICD-10-CM

## 2024-09-27 PROCEDURE — 90677 PCV20 VACCINE IM: CPT | Performed by: STUDENT IN AN ORGANIZED HEALTH CARE EDUCATION/TRAINING PROGRAM

## 2024-09-27 PROCEDURE — 90746 HEPB VACCINE 3 DOSE ADULT IM: CPT | Performed by: STUDENT IN AN ORGANIZED HEALTH CARE EDUCATION/TRAINING PROGRAM

## 2024-09-27 PROCEDURE — 90471 IMMUNIZATION ADMIN: CPT | Performed by: STUDENT IN AN ORGANIZED HEALTH CARE EDUCATION/TRAINING PROGRAM

## 2024-10-08 RX ORDER — METFORMIN HCL 500 MG
1000 TABLET, EXTENDED RELEASE 24 HR ORAL
Qty: 180 TABLET | Refills: 3 | Status: SHIPPED | OUTPATIENT
Start: 2024-10-08

## 2024-10-30 DIAGNOSIS — I10 HYPERTENSION, UNSPECIFIED TYPE: ICD-10-CM

## 2024-10-31 RX ORDER — VALSARTAN 160 MG/1
TABLET ORAL
Qty: 30 TABLET | Refills: 1 | OUTPATIENT
Start: 2024-10-31

## 2024-11-27 DIAGNOSIS — F41.9 ANXIETY AND DEPRESSION: ICD-10-CM

## 2024-11-27 DIAGNOSIS — I25.10 CORONARY ARTERY DISEASE INVOLVING NATIVE CORONARY ARTERY OF NATIVE HEART WITHOUT ANGINA PECTORIS: ICD-10-CM

## 2024-11-27 DIAGNOSIS — I10 HYPERTENSION, UNSPECIFIED TYPE: ICD-10-CM

## 2024-11-27 DIAGNOSIS — F32.A ANXIETY AND DEPRESSION: ICD-10-CM

## 2024-11-27 RX ORDER — METOPROLOL SUCCINATE 25 MG/1
25 TABLET, EXTENDED RELEASE ORAL DAILY
Qty: 30 TABLET | Refills: 0 | Status: SHIPPED | OUTPATIENT
Start: 2024-11-27

## 2024-11-27 RX ORDER — ROSUVASTATIN CALCIUM 5 MG/1
5 TABLET, COATED ORAL DAILY
Qty: 90 TABLET | Refills: 0 | Status: SHIPPED | OUTPATIENT
Start: 2024-11-27

## 2024-11-27 RX ORDER — METFORMIN HYDROCHLORIDE 500 MG/1
1000 TABLET, EXTENDED RELEASE ORAL
Qty: 180 TABLET | Refills: 0 | Status: SHIPPED | OUTPATIENT
Start: 2024-11-27

## 2024-11-27 RX ORDER — BUPROPION HYDROCHLORIDE 300 MG/1
300 TABLET ORAL DAILY
Qty: 90 TABLET | Refills: 0 | Status: SHIPPED | OUTPATIENT
Start: 2024-11-27

## 2024-11-29 DIAGNOSIS — I10 PRIMARY HYPERTENSION: ICD-10-CM

## 2024-11-30 RX ORDER — METOPROLOL TARTRATE 50 MG
50 TABLET ORAL 2 TIMES DAILY
Qty: 180 TABLET | Refills: 3 | OUTPATIENT
Start: 2024-11-30

## 2024-12-02 ENCOUNTER — TELEPHONE (OUTPATIENT)
Dept: FAMILY MEDICINE CLINIC | Facility: CLINIC | Age: 49
End: 2024-12-02
Payer: COMMERCIAL

## 2024-12-02 DIAGNOSIS — I10 PRIMARY HYPERTENSION: Primary | ICD-10-CM

## 2024-12-02 RX ORDER — VALSARTAN 40 MG/1
40 TABLET ORAL DAILY
Qty: 90 TABLET | Refills: 1 | Status: SHIPPED | OUTPATIENT
Start: 2024-12-02

## 2024-12-02 NOTE — TELEPHONE ENCOUNTER
Caller: Terrence James    Relationship: Self    Best call back number: [phone]    Requested Prescriptions:      AMLODIPINE     Pharmacy where request should be sent: [facility] [id number] [address]  AT [facility] [address] - [phone] PH - [phone] FX     Last office visit with prescribing clinician: 7/25/2024   Last telemedicine visit with prescribing clinician: Visit date not found   Next office visit with prescribing clinician: Visit date not found     Additional details provided by patient: PATIENT STATES HE NEEDS BLOOD PRESSURE MEDICATION BACK    Does the patient have less than a 3 day supply:  [x] Yes  [] No    Would you like a call back once the refill request has been completed: [] Yes [x] No    If the office needs to give you a call back, can they leave a voicemail: [] Yes [x] No    Saundra Todd Rep   12/02/24 12:27 EST

## 2024-12-02 NOTE — TELEPHONE ENCOUNTER
Called and spoke with patient and he said his BP is running 140/95 on the metoprolol. He said he regulates his BP himself and celeste wants to go back on Exforge. Please advise.

## 2024-12-21 DIAGNOSIS — I25.10 CORONARY ARTERY DISEASE INVOLVING NATIVE CORONARY ARTERY OF NATIVE HEART WITHOUT ANGINA PECTORIS: ICD-10-CM

## 2024-12-21 DIAGNOSIS — I10 HYPERTENSION, UNSPECIFIED TYPE: ICD-10-CM

## 2024-12-23 RX ORDER — METOPROLOL SUCCINATE 25 MG/1
25 TABLET, EXTENDED RELEASE ORAL DAILY
Qty: 30 TABLET | Refills: 0 | Status: SHIPPED | OUTPATIENT
Start: 2024-12-23

## 2025-01-20 DIAGNOSIS — I25.10 CORONARY ARTERY DISEASE INVOLVING NATIVE CORONARY ARTERY OF NATIVE HEART WITHOUT ANGINA PECTORIS: ICD-10-CM

## 2025-01-20 DIAGNOSIS — I10 HYPERTENSION, UNSPECIFIED TYPE: ICD-10-CM

## 2025-01-20 RX ORDER — METOPROLOL SUCCINATE 25 MG/1
25 TABLET, EXTENDED RELEASE ORAL DAILY
Qty: 30 TABLET | Refills: 0 | Status: SHIPPED | OUTPATIENT
Start: 2025-01-20 | End: 2025-01-23 | Stop reason: SDUPTHER

## 2025-01-20 NOTE — TELEPHONE ENCOUNTER
Caller: NEETU ARELLANO    Relationship: Emergency Contact    Best call back number: [phone]     Requested Prescriptions:   Requested Prescriptions     Pending Prescriptions Disp Refills    metoprolol succinate XL (TOPROL-XL) 25 MG 24 hr tablet 30 tablet 0     Sig: Take 1 tablet by mouth Daily.        Pharmacy where request should be sent: [facility] [id number] [address]  AT [facility] [address] [place] [address] - [phone]  - [phone] FX     Last office visit with prescribing clinician: 7/25/2024   Last telemedicine visit with prescribing clinician: Visit date not found   Next office visit with prescribing clinician: Visit date not found     Additional details provided by patient: COMPLETELY OUT AND IS GOING OUT OF TOWN TODAY AND WILL NOT BE BACK TIL THURSDAY. PLEASE SEND A FULL PRESCRIPTION OR AT LEAST TIL THURSDAY. PATIENT HAS AN APPT ON THURSDAY 1/23/25    Does the patient have less than a 3 day supply:  [x] Yes  [] No    Would you like a call back once the refill request has been completed: [] Yes [x] No    If the office needs to give you a call back, can they leave a voicemail: [] Yes [x] No    Saundra Lennon Rep   01/20/25 12:04 EST

## 2025-01-23 ENCOUNTER — OFFICE VISIT (OUTPATIENT)
Dept: FAMILY MEDICINE CLINIC | Facility: CLINIC | Age: 50
End: 2025-01-23
Payer: COMMERCIAL

## 2025-01-23 VITALS
SYSTOLIC BLOOD PRESSURE: 114 MMHG | DIASTOLIC BLOOD PRESSURE: 94 MMHG | WEIGHT: 215 LBS | HEIGHT: 71 IN | HEART RATE: 112 BPM | TEMPERATURE: 98.7 F | BODY MASS INDEX: 30.1 KG/M2 | OXYGEN SATURATION: 96 %

## 2025-01-23 DIAGNOSIS — I10 HYPERTENSION, UNSPECIFIED TYPE: Chronic | ICD-10-CM

## 2025-01-23 DIAGNOSIS — I25.10 CORONARY ARTERY DISEASE INVOLVING NATIVE CORONARY ARTERY OF NATIVE HEART WITHOUT ANGINA PECTORIS: Chronic | ICD-10-CM

## 2025-01-23 DIAGNOSIS — E11.65 TYPE 2 DIABETES MELLITUS WITH HYPERGLYCEMIA, WITHOUT LONG-TERM CURRENT USE OF INSULIN: Primary | Chronic | ICD-10-CM

## 2025-01-23 LAB
EXPIRATION DATE: ABNORMAL
HBA1C MFR BLD: 12.6 % (ref 4.5–5.7)
Lab: ABNORMAL

## 2025-01-23 PROCEDURE — 83036 HEMOGLOBIN GLYCOSYLATED A1C: CPT | Performed by: PHYSICIAN ASSISTANT

## 2025-01-23 PROCEDURE — 99214 OFFICE O/P EST MOD 30 MIN: CPT | Performed by: PHYSICIAN ASSISTANT

## 2025-01-23 RX ORDER — METOPROLOL SUCCINATE 50 MG/1
50 TABLET, EXTENDED RELEASE ORAL DAILY
Qty: 90 TABLET | Refills: 0 | Status: SHIPPED | OUTPATIENT
Start: 2025-01-23

## 2025-01-23 RX ORDER — APIXABAN 5 MG/1
1 TABLET, FILM COATED ORAL EVERY 12 HOURS SCHEDULED
COMMUNITY
Start: 2024-10-08

## 2025-01-23 RX ORDER — AMLODIPINE AND VALSARTAN 5; 160 MG/1; MG/1
1 TABLET ORAL DAILY
Qty: 90 TABLET | Refills: 0 | Status: SHIPPED | OUTPATIENT
Start: 2025-01-23

## 2025-01-23 NOTE — PATIENT INSTRUCTIONS
Managing Your Hypertension  Hypertension, also called high blood pressure, is when the force of the blood pressing against the walls of the arteries is too strong. Arteries are blood vessels that carry blood from your heart throughout your body. Hypertension forces the heart to work harder to pump blood and may cause the arteries to become narrow or stiff.  Understanding blood pressure readings  A blood pressure reading includes a higher number over a lower number:  The first, or top, number is called the systolic pressure. It is a measure of the pressure in your arteries as your heart beats.  The second, or bottom number, is called the diastolic pressure. It is a measure of the pressure in your arteries as the heart relaxes.  For most people, a normal blood pressure is below 120/80. Your personal target blood pressure may vary depending on your medical conditions, your age, and other factors.  Blood pressure is classified into four stages. Based on your blood pressure reading, your health care provider may use the following stages to determine what type of treatment you need, if any. Systolic pressure and diastolic pressure are measured in a unit called millimeters of mercury (mmHg).  Normal  Systolic pressure: below 120.  Diastolic pressure: below 80.  Elevated  Systolic pressure: 120-129.  Diastolic pressure: below 80.  Hypertension stage 1  Systolic pressure: 130-139.  Diastolic pressure: 80-89.  Hypertension stage 2  Systolic pressure: 140 or above.  Diastolic pressure: 90 or above.  How can this condition affect me?  Managing your hypertension is very important. Over time, hypertension can damage the arteries and decrease blood flow to parts of the body, including the brain, heart, and kidneys. Having untreated or uncontrolled hypertension can lead to:  A heart attack.  A stroke.  A weakened blood vessel (aneurysm).  Heart failure.  Kidney damage.  Eye damage.  Memory and concentration problems.  Vascular  dementia.  What actions can I take to manage this condition?  Hypertension can be managed by making lifestyle changes and possibly by taking medicines. Your health care provider will help you make a plan to bring your blood pressure within a normal range. You may be referred for counseling on a healthy diet and physical activity.  Nutrition    Eat a diet that is high in fiber and potassium, and low in salt (sodium), added sugar, and fat. An example eating plan is called the DASH diet. DASH stands for Dietary Approaches to Stop Hypertension. To eat this way:  Eat plenty of fresh fruits and vegetables. Try to fill one-half of your plate at each meal with fruits and vegetables.  Eat whole grains, such as whole-wheat pasta, brown rice, or whole-grain bread. Fill about one-fourth of your plate with whole grains.  Eat low-fat dairy products.  Avoid fatty cuts of meat, processed or cured meats, and poultry with skin. Fill about one-fourth of your plate with lean proteins such as fish, chicken without skin, beans, eggs, and tofu.  Avoid pre-made and processed foods. These tend to be higher in sodium, added sugar, and fat.  Reduce your daily sodium intake. Many people with hypertension should eat less than 1,500 mg of sodium a day.  Lifestyle    Work with your health care provider to maintain a healthy body weight or to lose weight. Ask what an ideal weight is for you.  Get at least 30 minutes of exercise that causes your heart to beat faster (aerobic exercise) most days of the week. Activities may include walking, swimming, or biking.  Include exercise to strengthen your muscles (resistance exercise), such as weight lifting, as part of your weekly exercise routine. Try to do these types of exercises for 30 minutes at least 3 days a week.  Do not use any products that contain nicotine or tobacco. These products include cigarettes, chewing tobacco, and vaping devices, such as e-cigarettes. If you need help quitting, ask your  health care provider.  Control any long-term (chronic) conditions you have, such as high cholesterol or diabetes.  Identify your sources of stress and find ways to manage stress. This may include meditation, deep breathing, or making time for fun activities.  Alcohol use  Do not drink alcohol if:  Your health care provider tells you not to drink.  You are pregnant, may be pregnant, or are planning to become pregnant.  If you drink alcohol:  Limit how much you have to:  0-1 drink a day for women.  0-2 drinks a day for men.  Know how much alcohol is in your drink. In the [place] one drink equals one 12 oz bottle of beer (355 mL), one 5 oz glass of wine (148 mL), or one 1½ oz glass of hard liquor (44 mL).  Medicines  Your health care provider may prescribe medicine if lifestyle changes are not enough to get your blood pressure under control and if:  Your systolic blood pressure is 130 or higher.  Your diastolic blood pressure is 80 or higher.  Take medicines only as told by your health care provider. Follow the directions carefully. Blood pressure medicines must be taken as told by your health care provider. The medicine does not work as well when you skip doses. Skipping doses also puts you at risk for problems.  Monitoring  Before you monitor your blood pressure:  Do not smoke, drink caffeinated beverages, or exercise within 30 minutes before taking a measurement.  Use the bathroom and empty your bladder (urinate).  Sit quietly for at least 5 minutes before taking measurements.  Monitor your blood pressure at home as told by your health care provider. To do this:  Sit with your back straight and supported.  Place your feet flat on the floor. Do not cross your legs.  Support your arm on a flat surface, such as a table. Make sure your upper arm is at heart level.  Each time you measure, take two or three readings one minute apart and record the results.  You may also need to have your blood pressure checked regularly by  your health care provider.  General information  Talk with your health care provider about your diet, exercise habits, and other lifestyle factors that may be contributing to hypertension.  Review all the medicines you take with your health care provider because there may be side effects or interactions.  Keep all follow-up visits. Your health care provider can help you create and adjust your plan for managing your high blood pressure.  Where to find more information  National Heart, Lung, and Blood [place]: www.nhlbi.nih.gov  American Heart Association: www.heart.org  Contact a health care provider if:  You think you are having a reaction to medicines you have taken.  You have repeated (recurrent) headaches.  You feel dizzy.  You have swelling in your ankles.  You have trouble with your vision.  Get help right away if:  You develop a severe headache or confusion.  You have unusual weakness or numbness, or you feel faint.  You have severe pain in your chest or abdomen.  You vomit repeatedly.  You have trouble breathing.  These symptoms may be an emergency. Get help right away. Call 911.  Do not wait to see if the symptoms will go away.  Do not drive yourself to the hospital.  Summary  Hypertension is when the force of blood pumping through your arteries is too strong. If this condition is not controlled, it may put you at risk for serious complications.  Your personal target blood pressure may vary depending on your medical conditions, your age, and other factors. For most people, a normal blood pressure is less than 120/80.  Hypertension is managed by lifestyle changes, medicines, or both.  Lifestyle changes to help manage hypertension include losing weight, eating a healthy, low-sodium diet, exercising more, stopping smoking, and limiting alcohol.  This information is not intended to replace advice given to you by your health care provider. Make sure you discuss any questions you have with your health care  provider.  Document Revised: 09/01/2022 Document Reviewed: 09/01/2022  Elsevier Patient Education © 2024 [employer]

## 2025-01-23 NOTE — PROGRESS NOTES
Follow Up Office Visit      Patient Name: Terrence James  : 1975   MRN: [id number]     Chief Complaint:    Chief Complaint   Patient presents with    Hypertension     Per patient he needs to discuss medication; states it is not working.      History of Present Illness  The patient presents for evaluation of blood pressure management.    In the summer 2024 patient was diagnosed with necrotizing pancreatitis and underwent a necrosectomy.  He states at this time there was significant fluctuations of his blood pressure, and multiple blood pressure medications were discontinued due to multiple hypotensive episodes.  He has been monitoring his blood pressure at home and has observed an upward trend, with occasional spikes to high levels.  Reported blood pressure findings ranging from 140/100 to 120/95, with diastolic values often exceeding 90 and occasionally surpassing 100.  States he has been attempting to self medicate, informing me that he took he reports a reduction in alcohol consumption, although he still enjoys an occasional beer or glass of wine. He has not had any recent lab work done. He has been self-medicating and titrating his current medications.  States several days he has been taking 2 tablets of his metoprolol 25 mg, and 2 to 3 tablets of his valsartan 40 mg in order to control his blood pressure.  He states this morning prior to arrival he took a total of 120 mg of valsartan.  He has not yet taken his metoprolol today.  He was previously on a combination of amlodipine and valsartan, which he believes may be beneficial to resume. He also reports a sensation of rapid heartbeats, which he attributes to the decreased dose of metoprolol.  He is interested in increasing this dose as well.    He has a history of cardiac stent placement in  at [facility] but is no longer under the care of a cardiologist. He is requesting a referral to a new cardiologist.             Subjective       I have reviewed and the following portions of the patient's history were updated as appropriate: past family history, past medical history, past social history, past surgical history and problem list.    Medications:     Current Outpatient Medications:     Blood Glucose Monitoring Suppl (FreeStyle Lite) device, Use 1 Device Daily., Disp: 1 each, Rfl: 0    buPROPion XL (WELLBUTRIN XL) 300 MG 24 hr tablet, Take 1 tablet by mouth Daily., Disp: 90 tablet, Rfl: 0    Eliquis 5 MG tablet tablet, Take 1 tablet by mouth Every 12 (Twelve) Hours., Disp: , Rfl:     glucose blood test strip, Test once daily before breakfast and as needed, Disp: 90 each, Rfl: 3    metFORMIN ER (GLUCOPHAGE-XR) 500 MG 24 hr tablet, Take 2 tablets by mouth Daily With Breakfast., Disp: 180 tablet, Rfl: 0    metoprolol succinate XL (TOPROL-XL) 50 MG 24 hr tablet, Take 1 tablet by mouth Daily., Disp: 90 tablet, Rfl: 0    omeprazole (priLOSEC) 40 MG capsule, Take 1 capsule by mouth Daily., Disp: 30 capsule, Rfl: 5    ondansetron ODT (ZOFRAN-ODT) 4 MG disintegrating tablet, Place 1 tablet on the tongue Every 8 (Eight) Hours As Needed for Nausea or Vomiting., Disp: 30 tablet, Rfl: 1    rosuvastatin (CRESTOR) 5 MG tablet, Take 1 tablet by mouth Daily., Disp: 90 tablet, Rfl: 0    sildenafil (VIAGRA) 25 MG tablet, TAKE 1 TO 4 TABLETS BY MOUTH ONCE DAILY AS NEEDED FOR ERECTILE DYSFUNCTION 1 TO 2 HOURS PRIOR TO SEXUAL ACTIVITY, Disp: , Rfl:     amLODIPine-valsartan (EXFORGE) 5-160 MG per tablet, Take 1 tablet by mouth Daily., Disp: 90 tablet, Rfl: 0    empagliflozin (JARDIANCE) 25 MG tablet tablet, Take 1 tablet by mouth Daily., Disp: 90 tablet, Rfl: 0    Allergies:   No Known Allergies    Objective     Physical Exam:   Physical Exam  Constitutional:       General: He is not in acute distress.     Appearance: He is obese. He is not ill-appearing.   HENT:      Head: Normocephalic.   Cardiovascular:      Rate and Rhythm: Normal rate and regular rhythm.       "Heart sounds: No murmur heard.  Pulmonary:      Effort: Pulmonary effort is normal. No respiratory distress.      Breath sounds: Normal breath sounds.   Musculoskeletal:         General: Normal range of motion.   Skin:     General: Skin is warm.   Neurological:      General: No focal deficit present.      Mental Status: He is alert.   Psychiatric:         Mood and Affect: Mood normal.         Vital Signs:   Vitals:    01/23/25 1002   BP: 114/94   Pulse: 112   Temp: 98.7 °F (37.1 °C)   TempSrc: Infrared   SpO2: 96%   Weight: 97.5 kg (215 lb)   Height: 180.3 cm (70.98\")   PainSc: 0-No pain     Body mass index is 30 kg/m².         Procedures    Assessment / Plan         Assessment and Plan     Diagnoses and all orders for this visit:    1. Type 2 diabetes mellitus with hyperglycemia, without long-term current use of insulin (Primary)  Assessment & Plan:  A1c with marked increase from 7.5 x 7 months ago to 12.6 today  Lengthy discussion with patient regarding diet and lifestyle modifications  Will continue metformin 100 mg daily, may benefit from increasing to 2000 mg daily  Will add Jardiance 25 mg daily-samples provided today.      Orders:  -     POC Glycosylated Hemoglobin (Hb A1C)  -     empagliflozin (JARDIANCE) 25 MG tablet tablet; Take 1 tablet by mouth Daily.  Dispense: 90 tablet; Refill: 0    2. Coronary artery disease involving native coronary artery of native heart without angina pectoris  -     metoprolol succinate XL (TOPROL-XL) 50 MG 24 hr tablet; Take 1 tablet by mouth Daily.  Dispense: 90 tablet; Refill: 0  -     Ambulatory Referral to Cardiology    3. Hypertension, unspecified type  Assessment & Plan:  Will increase metoprolol from 25 mg daily, to 50 mg-patient feels he should be taking 100 mg of metoprolol, though we will continue to titrate this slowly to ensure we do not cause significant bradycardia or hypotension.  Will restart amlodipine-valsartan combo, though will start at slightly lower dose of " 5-160 mg  Strongly recommended ambulatory blood pressure monitoring  Diet and lifestyle modifications discussed  Strongly advised patient discontinue all alcohol intake    Orders:  -     metoprolol succinate XL (TOPROL-XL) 50 MG 24 hr tablet; Take 1 tablet by mouth Daily.  Dispense: 90 tablet; Refill: 0  -     amLODIPine-valsartan (EXFORGE) 5-160 MG per tablet; Take 1 tablet by mouth Daily.  Dispense: 90 tablet; Refill: 0  -     Ambulatory Referral to Cardiology    Will complete additional routine/screening labs at follow-up in 2 weeks.  Patient advised to present fasting.          Follow Up:   Return in about 2 weeks (around 2/6/2025) for BP, HR , Fasting Labs.    Patient or patient representative verbalized consent for the use of Ambient Listening during the visit with  Aubree Franco PA-C for chart documentation. 1/24/2025  10:14 EST    Aubree Franco PA-C    [facility]

## 2025-01-24 NOTE — ASSESSMENT & PLAN NOTE
Will increase metoprolol from 25 mg daily, to 50 mg-patient feels he should be taking 100 mg of metoprolol, though we will continue to titrate this slowly to ensure we do not cause significant bradycardia or hypotension.  Will restart amlodipine-valsartan combo, though will start at slightly lower dose of 5-160 mg  Strongly recommended ambulatory blood pressure monitoring  Diet and lifestyle modifications discussed  Strongly advised patient discontinue all alcohol intake

## 2025-01-24 NOTE — ASSESSMENT & PLAN NOTE
A1c with marked increase from 7.5 x 7 months ago to 12.6 today  Lengthy discussion with patient regarding diet and lifestyle modifications  Will continue metformin 100 mg daily, may benefit from increasing to 2000 mg daily  Will add Jardiance 25 mg daily-samples provided today.

## 2025-02-04 ENCOUNTER — LAB (OUTPATIENT)
Dept: LAB | Facility: HOSPITAL | Age: 50
End: 2025-02-04
Payer: COMMERCIAL

## 2025-02-04 ENCOUNTER — OFFICE VISIT (OUTPATIENT)
Dept: FAMILY MEDICINE CLINIC | Facility: CLINIC | Age: 50
End: 2025-02-04
Payer: COMMERCIAL

## 2025-02-04 ENCOUNTER — NURSE TRIAGE (OUTPATIENT)
Dept: CALL CENTER | Facility: HOSPITAL | Age: 50
End: 2025-02-04
Payer: COMMERCIAL

## 2025-02-04 VITALS
WEIGHT: 217 LBS | BODY MASS INDEX: 30.38 KG/M2 | HEART RATE: 88 BPM | SYSTOLIC BLOOD PRESSURE: 130 MMHG | DIASTOLIC BLOOD PRESSURE: 98 MMHG | HEIGHT: 71 IN | TEMPERATURE: 98.4 F

## 2025-02-04 DIAGNOSIS — E78.5 HYPERLIPIDEMIA LDL GOAL <70: ICD-10-CM

## 2025-02-04 DIAGNOSIS — I10 PRIMARY HYPERTENSION: ICD-10-CM

## 2025-02-04 DIAGNOSIS — K86.3 PANCREATIC PSEUDOCYST: ICD-10-CM

## 2025-02-04 DIAGNOSIS — E11.65 TYPE 2 DIABETES MELLITUS WITH HYPERGLYCEMIA, WITHOUT LONG-TERM CURRENT USE OF INSULIN: ICD-10-CM

## 2025-02-04 DIAGNOSIS — I25.10 CORONARY ARTERY DISEASE INVOLVING NATIVE CORONARY ARTERY OF NATIVE HEART WITHOUT ANGINA PECTORIS: Chronic | ICD-10-CM

## 2025-02-04 DIAGNOSIS — E11.65 TYPE 2 DIABETES MELLITUS WITH HYPERGLYCEMIA, WITHOUT LONG-TERM CURRENT USE OF INSULIN: Primary | ICD-10-CM

## 2025-02-04 LAB
ALBUMIN SERPL-MCNC: 3.8 G/DL (ref 3.5–5.2)
ALBUMIN UR-MCNC: 2.4 MG/DL
ALBUMIN/GLOB SERPL: 1 G/DL
ALP SERPL-CCNC: 110 U/L (ref 39–117)
ALT SERPL W P-5'-P-CCNC: 24 U/L (ref 1–41)
ANION GAP SERPL CALCULATED.3IONS-SCNC: 14.9 MMOL/L (ref 5–15)
AST SERPL-CCNC: 29 U/L (ref 1–40)
BASOPHILS # BLD AUTO: 0.03 10*3/MM3 (ref 0–0.2)
BASOPHILS NFR BLD AUTO: 0.3 % (ref 0–1.5)
BILIRUB SERPL-MCNC: 0.6 MG/DL (ref 0–1.2)
BUN SERPL-MCNC: 14 MG/DL (ref 6–20)
BUN/CREAT SERPL: 10.6 (ref 7–25)
CALCIUM SPEC-SCNC: 9.4 MG/DL (ref 8.6–10.5)
CHLORIDE SERPL-SCNC: 96 MMOL/L (ref 98–107)
CHOLEST SERPL-MCNC: 127 MG/DL (ref 0–200)
CO2 SERPL-SCNC: 26.1 MMOL/L (ref 22–29)
CREAT SERPL-MCNC: 1.32 MG/DL (ref 0.76–1.27)
CREAT UR-MCNC: 61.5 MG/DL
DEPRECATED RDW RBC AUTO: 58.3 FL (ref 37–54)
EGFRCR SERPLBLD CKD-EPI 2021: 65.7 ML/MIN/1.73
EOSINOPHIL # BLD AUTO: 0.01 10*3/MM3 (ref 0–0.4)
EOSINOPHIL NFR BLD AUTO: 0.1 % (ref 0.3–6.2)
ERYTHROCYTE [DISTWIDTH] IN BLOOD BY AUTOMATED COUNT: 19.7 % (ref 12.3–15.4)
GLOBULIN UR ELPH-MCNC: 3.7 GM/DL
GLUCOSE SERPL-MCNC: 211 MG/DL (ref 65–99)
HBA1C MFR BLD: 13 % (ref 4.8–5.6)
HCT VFR BLD AUTO: 37.9 % (ref 37.5–51)
HDLC SERPL-MCNC: 45 MG/DL (ref 40–60)
HGB BLD-MCNC: 12.3 G/DL (ref 13–17.7)
IMM GRANULOCYTES # BLD AUTO: 0.03 10*3/MM3 (ref 0–0.05)
IMM GRANULOCYTES NFR BLD AUTO: 0.3 % (ref 0–0.5)
LDLC SERPL CALC-MCNC: 58 MG/DL (ref 0–100)
LDLC/HDLC SERPL: 1.21 {RATIO}
LYMPHOCYTES # BLD AUTO: 2.02 10*3/MM3 (ref 0.7–3.1)
LYMPHOCYTES NFR BLD AUTO: 19.9 % (ref 19.6–45.3)
MCH RBC QN AUTO: 27.3 PG (ref 26.6–33)
MCHC RBC AUTO-ENTMCNC: 32.5 G/DL (ref 31.5–35.7)
MCV RBC AUTO: 84.2 FL (ref 79–97)
MICROALBUMIN/CREAT UR: 39 MG/G (ref 0–29)
MONOCYTES # BLD AUTO: 0.78 10*3/MM3 (ref 0.1–0.9)
MONOCYTES NFR BLD AUTO: 7.7 % (ref 5–12)
NEUTROPHILS NFR BLD AUTO: 7.26 10*3/MM3 (ref 1.7–7)
NEUTROPHILS NFR BLD AUTO: 71.7 % (ref 42.7–76)
NRBC BLD AUTO-RTO: 0 /100 WBC (ref 0–0.2)
PLATELET # BLD AUTO: 254 10*3/MM3 (ref 140–450)
PMV BLD AUTO: 11.6 FL (ref 6–12)
POTASSIUM SERPL-SCNC: 4.3 MMOL/L (ref 3.5–5.2)
PROT SERPL-MCNC: 7.5 G/DL (ref 6–8.5)
RBC # BLD AUTO: 4.5 10*6/MM3 (ref 4.14–5.8)
SODIUM SERPL-SCNC: 137 MMOL/L (ref 136–145)
TRIGL SERPL-MCNC: 138 MG/DL (ref 0–150)
TSH SERPL DL<=0.05 MIU/L-ACNC: 1.69 UIU/ML (ref 0.27–4.2)
VLDLC SERPL-MCNC: 24 MG/DL (ref 5–40)
WBC NRBC COR # BLD AUTO: 10.13 10*3/MM3 (ref 3.4–10.8)

## 2025-02-04 PROCEDURE — 99214 OFFICE O/P EST MOD 30 MIN: CPT | Performed by: FAMILY MEDICINE

## 2025-02-04 PROCEDURE — 80053 COMPREHEN METABOLIC PANEL: CPT

## 2025-02-04 PROCEDURE — 83036 HEMOGLOBIN GLYCOSYLATED A1C: CPT

## 2025-02-04 PROCEDURE — 84443 ASSAY THYROID STIM HORMONE: CPT

## 2025-02-04 PROCEDURE — 85025 COMPLETE CBC W/AUTO DIFF WBC: CPT

## 2025-02-04 PROCEDURE — 82043 UR ALBUMIN QUANTITATIVE: CPT

## 2025-02-04 PROCEDURE — 36415 COLL VENOUS BLD VENIPUNCTURE: CPT

## 2025-02-04 PROCEDURE — 80061 LIPID PANEL: CPT

## 2025-02-04 PROCEDURE — 82570 ASSAY OF URINE CREATININE: CPT

## 2025-02-04 RX ORDER — FLURBIPROFEN SODIUM 0.3 MG/ML
1 SOLUTION/ DROPS OPHTHALMIC 4 TIMES DAILY
Qty: 180 EACH | Refills: 0 | Status: SHIPPED | OUTPATIENT
Start: 2025-02-04

## 2025-02-04 RX ORDER — GLUCOSAMINE HCL/CHONDROITIN SU 500-400 MG
CAPSULE ORAL
Qty: 1 EACH | Refills: 0 | Status: SHIPPED | OUTPATIENT
Start: 2025-02-04 | End: 2025-02-09 | Stop reason: SDUPTHER

## 2025-02-04 RX ORDER — BLOOD-GLUCOSE METER
EACH MISCELLANEOUS
Qty: 1 KIT | Refills: 0 | Status: SHIPPED | OUTPATIENT
Start: 2025-02-04

## 2025-02-04 RX ORDER — AMLODIPINE AND VALSARTAN 5; 160 MG/1; MG/1
1 TABLET ORAL DAILY
Qty: 90 TABLET | Refills: 0 | Status: SHIPPED | OUTPATIENT
Start: 2025-02-04

## 2025-02-04 RX ORDER — OXYCODONE HYDROCHLORIDE 5 MG/1
TABLET ORAL
COMMUNITY
Start: 2025-02-03

## 2025-02-04 RX ORDER — INSULIN ASPART 100 [IU]/ML
INJECTION, SOLUTION INTRAVENOUS; SUBCUTANEOUS
Qty: 3 ML | Refills: 2 | Status: SHIPPED | OUTPATIENT
Start: 2025-02-04

## 2025-02-04 RX ORDER — METOPROLOL SUCCINATE 100 MG/1
100 TABLET, EXTENDED RELEASE ORAL DAILY
Qty: 90 TABLET | Refills: 0 | Status: SHIPPED | OUTPATIENT
Start: 2025-02-04

## 2025-02-04 NOTE — PATIENT INSTRUCTIONS
Correction insulin (add to mealtime insulin)   0 unit  if  Blood sugar (BS)  less than 150   2 unit   if BS  150 - 199   4 units if  - 249   6 units if  - 299   8 units if  - 349   10 units if -399   12 units if BS >400

## 2025-02-04 NOTE — ASSESSMENT & PLAN NOTE
Hypertension is stable and controlled  Medication changes per orders.  Dietary sodium restriction.  Weight loss.  Regular aerobic exercise.  Ambulatory blood pressure monitoring.  Blood pressure will be reassessed in 1 month.    Continue amlodipine 5 mg-valsartan 160 mg, increase metoprolol to 100 mg daily.

## 2025-02-04 NOTE — TELEPHONE ENCOUNTER
"HUB Call    Caller sent over due to complaints of spouses BS being 267 yesterday. Caller advises that patient was advised by specialist that he needed to placed on Insulin immediately and she is calling to get appt. Pushed up due to BS being 267. EMR review reveals patient being seen by Dr. Oleksandr Campa at [facility] for necrotizing pancreatitis. Call connected through back line and situation explained to office staff prior to connection.         Answer Assessment - Initial Assessment Questions  1. REASON FOR CALL or QUESTION: \"What is your reason for calling today?\" or \"How can I best help you?\" or \"What question do you have that I can help answer?\"      Caller to [facility] scheduling center trying to make appt. To get spouse in sooner to be seen to be started on Insulin. Connect caller to [facility] for scheduling. Caller and patient not together and BS was elevated yesterday. Not able to triage this call.    Protocols used: Information Only Call-ADULT-AH    "

## 2025-02-04 NOTE — ASSESSMENT & PLAN NOTE
Significant changes to diabetic regimen made in office today.  Discussed them in detail with patient.  He is continue Jardiance 25 mg daily for renal protection and cardiovascular risk prevention.  Continue metformin 1000 mg daily.    Start Lantus 20 units nightly and NovoLog sliding scale with meals.    Referring patient to endocrinology    Correction insulin (add to mealtime insulin)   0 unit  if  Blood sugar (BS)  less than 150   2 unit   if BS  150 - 199   4 units if  - 249   6 units if  - 299   8 units if  - 349   10 units if -399   12 units if BS >400

## 2025-02-04 NOTE — PROGRESS NOTES
Subjective     Chief Complaint  Diabetes (3c diabetes. Recommended to get a glucose pump)    Subjective          Terrence James is a 49 y.o. male who presents today to [facility] for follow up.    HPI:   History of Present Illness    Mr. Swanson is a 49-year-old male presents today to follow-up on chronic conditions including diabetes.  His hemoglobin A1c most recently was 12.6%.  At this time, Jardiance was added to Metformin. He is having issues with his blood glucose monitor. He notes that he had an ERCP yesterday. He notes that his gastroenterologist encouraged him that he has a very small amount of pancreas left and will need insulin long term, likely needing an insulin pump in the future.     Hypertension - his blood pressure in office today is 130/98, currently prescribed Metoprolol 50 mg daily, Amlodipine/valsartan 5 mg-160 mg. He has been taking Metoprolol 125 mg daily and his blood pressure has been more controlled.     The following portions of the patient's history were reviewed and updated as appropriate: allergies, current medications, past family history, past medical history, past social history, past surgical history and problem list.    Objective     Objective     Allergy:   No Known Allergies     Current Medications:   Current Outpatient Medications   Medication Sig Dispense Refill    amLODIPine-valsartan (EXFORGE) 5-160 MG per tablet Take 1 tablet by mouth Daily. 90 tablet 0    buPROPion XL (WELLBUTRIN XL) 300 MG 24 hr tablet Take 1 tablet by mouth Daily. 90 tablet 0    Eliquis 5 MG tablet tablet Take 1 tablet by mouth Every 12 (Twelve) Hours.      empagliflozin (JARDIANCE) 25 MG tablet tablet Take 1 tablet by mouth Daily. 90 tablet 0    metFORMIN ER (GLUCOPHAGE-XR) 500 MG 24 hr tablet Take 2 tablets by mouth Daily With Breakfast. 180 tablet 0    metoprolol succinate XL (TOPROL-XL) 100 MG 24 hr tablet Take 1 tablet by mouth Daily. 90 tablet 0    omeprazole  (priLOSEC) 40 MG capsule Take 1 capsule by mouth Daily. 30 capsule 5    oxyCODONE (ROXICODONE) 5 MG immediate release tablet       rosuvastatin (CRESTOR) 5 MG tablet Take 1 tablet by mouth Daily. 90 tablet 0    sildenafil (VIAGRA) 25 MG tablet TAKE 1 TO 4 TABLETS BY MOUTH ONCE DAILY AS NEEDED FOR ERECTILE DYSFUNCTION 1 TO 2 HOURS PRIOR TO SEXUAL ACTIVITY      Alcohol Swabs 70 % pads Test daily before all meals/snacks and once before bedtime. 1 each 0    B-D UF III MINI PEN NEEDLES 31G X 5 MM misc Use 1 package 4 (Four) Times a Day. 180 each 0    Blood Glucose Monitoring Suppl (Accu-Chek Mary Plus) w/Device kit Test daily before all meals/snacks and once before bedtime. 1 kit 0    glucose blood (Accu-Chek Mary) test strip Test daily before all meals/snacks and once before bedtime. 3 each 0    insulin aspart (NovoLOG FlexPen) 100 UNIT/ML solution pen-injector sc pen Inject 3-7 units subcutaneously three times daily with meals 3 mL 2    Insulin Glargine (LANTUS SOLOSTAR) 100 UNIT/ML injection pen Inject 20 Units under the skin into the appropriate area as directed Every Night. 3 mL 1    Lancets Misc. kit Use 1 each 2 (Two) Times a Day. 180 each 0     No current facility-administered medications for this visit.       Past Medical History:  Past Medical History:   Diagnosis Date    Anxiety     COVID-19 08/2021    DVT (deep venous thrombosis)     LEFT LEG     Hypertension     Wears reading eyeglasses        Past Surgical History:  Past Surgical History:   Procedure Laterality Date    CARDIAC CATHETERIZATION  08/2019    CORONARY STENT PLACEMENT  08/2019    EAR TUBES Bilateral 2012    FEMORAL THROMBECTOMY/EMBOLECTOMY Left 10/20/2021    Procedure: ULTRASOUND GUIDED ACCESS; LEFT LEG VENOGRAM; INTRAVENOUS ULTRASOUND OF FEMORAL VEIN, POPLITEAL VEIN, EXTERNAL AND COMMON ILIAC VEINS; LEFT POPLITEAL VEIN ANGIOPLASTY; LEFT COMMON ILIAC VEIN ANGIOPLASTY; POPLITEAL AND FEMORAL VEIN THROMBECTOMY - LEFT;  Surgeon: Arnel Gordon MD;   "Location: [facility];  Service: Vascular;  Laterality: Left;  FLUORO:3 MIN 48 SEC  DOSE: 45 MGY  CONTRAS    GASTRIC SLEEVE LAPAROSCOPIC  2014    LUMBAR DISCECTOMY      L3-4    VASECTOMY         Social History:  Social History     Socioeconomic History    Marital status: Single   Tobacco Use    Smoking status: Former     Current packs/day: 0.00     Average packs/day: 0.3 packs/day for 22.0 years (5.5 ttl pk-yrs)     Types: Cigarettes     Start date:      Quit date:      Years since quittin.0     Passive exposure: Never    Smokeless tobacco: Never   Vaping Use    Vaping status: Never Used   Substance and Sexual Activity    Alcohol use: Yes     Comment: ~2-3 DRINK WEEKLY     Drug use: Never    Sexual activity: Yes     Partners: Female     Comment: 5-6 female partners in the last year.       Family History:  Family History   Problem Relation Age of Onset    Vision loss Mother     Diabetes Mother     Kidney disease Mother     Skin cancer Mother         squamous cell    Heart attack Father     No Known Problems Sister     No Known Problems Sister     No Known Problems Brother     Other Maternal Grandmother         unknown    Other Maternal Grandfather         unknown    Other Paternal Grandmother         unknown    Other Paternal Grandfather         unknown    No Known Problems Daughter     No Known Problems Son          Vital Signs:   /98   Pulse 88   Temp 98.4 °F (36.9 °C) (Infrared)   Ht 180.3 cm (70.98\")   Wt 98.4 kg (217 lb)   BMI 30.28 kg/m²      Physical Exam:  Physical Exam  Vitals reviewed.   Constitutional:       Appearance: He is not ill-appearing.   Cardiovascular:      Rate and Rhythm: Normal rate.      Pulses: Normal pulses.   Pulmonary:      Effort: Pulmonary effort is normal.      Breath sounds: Normal breath sounds.   Neurological:      General: No focal deficit present.      Mental Status: He is alert. Mental status is at baseline.   Psychiatric:         Mood and " Affect: Mood normal.         Behavior: Behavior normal.         Thought Content: Thought content normal.         Judgment: Judgment normal.               PHQ-9 Score  PHQ-9 Total Score:      Lab Review  Admission on 01/27/2025, Discharged on 01/27/2025   Component Date Value Ref Range Status    Expiration Date 01/27/2025 3/6/27   Final    Lot Number 01/27/2025 [id number]   Final    Internal Control 01/27/2025 Passed   Final    Rapid Strep A Screen 01/27/2025 Negative   Final    SARS Antigen 01/27/2025 Not Detected  Not Detected, Presumptive Negative Final    Influenza A Antigen NIKKIE 01/27/2025 Not Detected  Not Detected Final    Influenza B Antigen NIKKIE 01/27/2025 Not Detected  Not Detected Final    Internal Control 01/27/2025 Passed  Passed Final    Lot Number 01/27/2025 [id number]   Final    Expiration Date 01/27/2025 11/14/25   Final   Office Visit on 01/23/2025   Component Date Value Ref Range Status    Hemoglobin A1C 01/23/2025 12.6 (A)  4.5 - 5.7 % Final    Lot Number 01/23/2025 [id number]   Final    Expiration Date 01/23/2025 10/18/2026   Final        Radiology Results        Assessment / Plan         Assessment and Plan   Diagnoses and all orders for this visit:    1. Type 2 diabetes mellitus with hyperglycemia, without long-term current use of insulin (Primary)  Assessment & Plan:  Significant changes to diabetic regimen made in office today.  Discussed them in detail with patient.  He is continue Jardiance 25 mg daily for renal protection and cardiovascular risk prevention.  Continue metformin 1000 mg daily.    Start Lantus 20 units nightly and NovoLog sliding scale with meals.    Referring patient to endocrinology    Correction insulin (add to mealtime insulin)   0 unit  if  Blood sugar (BS)  less than 150   2 unit   if BS  150 - 199   4 units if  - 249   6 units if  - 299   8 units if  - 349   10 units if -399   12 units if BS >400         Orders:  -     CBC and Differential;  Future  -     Comprehensive metabolic panel; Future  -     Hemoglobin A1c; Future  -     Lipid panel; Future  -     Microalbumin / Creatinine Urine Ratio - Urine, Clean Catch; Future  -     TSH Rfx On Abnormal To Free T4; Future  -     Blood Glucose Monitoring Suppl (Accu-Chek Mary Plus) w/Device kit; Test daily before all meals/snacks and once before bedtime.  Dispense: 1 kit; Refill: 0  -     glucose blood (Accu-Chek Mary) test strip; Test daily before all meals/snacks and once before bedtime.  Dispense: 3 each; Refill: 0  -     Lancets Misc. kit; Use 1 each 2 (Two) Times a Day.  Dispense: 180 each; Refill: 0  -     Alcohol Swabs 70 % pads; Test daily before all meals/snacks and once before bedtime.  Dispense: 1 each; Refill: 0  -     Insulin Glargine (LANTUS SOLOSTAR) 100 UNIT/ML injection pen; Inject 20 Units under the skin into the appropriate area as directed Every Night.  Dispense: 3 mL; Refill: 1  -     insulin aspart (NovoLOG FlexPen) 100 UNIT/ML solution pen-injector sc pen; Inject 3-7 units subcutaneously three times daily with meals  Dispense: 3 mL; Refill: 2  -     empagliflozin (JARDIANCE) 25 MG tablet tablet; Take 1 tablet by mouth Daily.  Dispense: 90 tablet; Refill: 0  -     B-D UF III MINI PEN NEEDLES 31G X 5 MM misc; Use 1 package 4 (Four) Times a Day.  Dispense: 180 each; Refill: 0    2. Primary hypertension  Assessment & Plan:  Hypertension is stable and controlled  Medication changes per orders.  Dietary sodium restriction.  Weight loss.  Regular aerobic exercise.  Ambulatory blood pressure monitoring.  Blood pressure will be reassessed in 1 month.    Continue amlodipine 5 mg-valsartan 160 mg, increase metoprolol to 100 mg daily.    Orders:  -     Comprehensive metabolic panel; Future  -     metoprolol succinate XL (TOPROL-XL) 100 MG 24 hr tablet; Take 1 tablet by mouth Daily.  Dispense: 90 tablet; Refill: 0  -     amLODIPine-valsartan (EXFORGE) 5-160 MG per tablet; Take 1 tablet by mouth  Daily.  Dispense: 90 tablet; Refill: 0    3. Hyperlipidemia LDL goal <70  Assessment & Plan:   Fasting lipid panel ordered with labs, continue Crestor 5 mg daily    Orders:  -     Lipid panel; Future    4. Coronary artery disease involving native coronary artery of native heart without angina pectoris  -     metoprolol succinate XL (TOPROL-XL) 100 MG 24 hr tablet; Take 1 tablet by mouth Daily.  Dispense: 90 tablet; Refill: 0        Discussed possible differential diagnoses, testing, treatment, recommended non-pharmacological interventions, risks, warning signs to monitor for that would indicate need for follow-up in clinic or ER. If no improvement with these regimens or you have new or worsening symptoms follow-up. Patient verbalizes understanding and agreement with plan of care. Denies further needs or concerns.     Patient was given instructions and counseling regarding her condition and for health maintenance advised.            Health Maintenance  Health Maintenance:   Health Maintenance Due   Topic Date Due    DIABETIC FOOT EXAM  Never done    DIABETIC EYE EXAM  Never done    ANNUAL PHYSICAL  08/17/2024    Hepatitis B (2 of 3 - 19+ 3-dose series) 10/25/2024        Meds ordered during this visit  New Medications Ordered This Visit   Medications    Blood Glucose Monitoring Suppl (Accu-Chek Mary Plus) w/Device kit     Sig: Test daily before all meals/snacks and once before bedtime.     Dispense:  1 kit     Refill:  0    glucose blood (Accu-Chek Mary) test strip     Sig: Test daily before all meals/snacks and once before bedtime.     Dispense:  3 each     Refill:  0    Lancets Misc. kit     Sig: Use 1 each 2 (Two) Times a Day.     Dispense:  180 each     Refill:  0    Alcohol Swabs 70 % pads     Sig: Test daily before all meals/snacks and once before bedtime.     Dispense:  1 each     Refill:  0    Insulin Glargine (LANTUS SOLOSTAR) 100 UNIT/ML injection pen     Sig: Inject 20 Units under the skin into the  appropriate area as directed Every Night.     Dispense:  3 mL     Refill:  1    insulin aspart (NovoLOG FlexPen) 100 UNIT/ML solution pen-injector sc pen     Sig: Inject 3-7 units subcutaneously three times daily with meals     Dispense:  3 mL     Refill:  2    metoprolol succinate XL (TOPROL-XL) 100 MG 24 hr tablet     Sig: Take 1 tablet by mouth Daily.     Dispense:  90 tablet     Refill:  0    amLODIPine-valsartan (EXFORGE) 5-160 MG per tablet     Sig: Take 1 tablet by mouth Daily.     Dispense:  90 tablet     Refill:  0    empagliflozin (JARDIANCE) 25 MG tablet tablet     Sig: Take 1 tablet by mouth Daily.     Dispense:  90 tablet     Refill:  0    B-D UF III MINI PEN NEEDLES 31G X 5 MM misc     Sig: Use 1 package 4 (Four) Times a Day.     Dispense:  180 each     Refill:  0       Meds stopped during this visit:  Medications Discontinued During This Encounter   Medication Reason    ondansetron ODT (ZOFRAN-ODT) 4 MG disintegrating tablet *Therapy completed    promethazine-dextromethorphan (PROMETHAZINE-DM) 6.25-15 MG/5ML syrup *Therapy completed    Blood Glucose Monitoring Suppl (FreeStyle Lite) device     glucose blood test strip     metoprolol succinate XL (TOPROL-XL) 50 MG 24 hr tablet Reorder    amLODIPine-valsartan (EXFORGE) 5-160 MG per tablet Reorder    empagliflozin (JARDIANCE) 25 MG tablet tablet Reorder        Visit Diagnoses    ICD-10-CM ICD-9-CM   1. Type 2 diabetes mellitus with hyperglycemia, without long-term current use of insulin  E11.65 250.00     790.29   2. Primary hypertension  I10 401.9   3. Hyperlipidemia LDL goal <70  E78.5 272.4   4. Coronary artery disease involving native coronary artery of native heart without angina pectoris  I25.10 414.01       Patient was given instructions and counseling regarding his condition or for health maintenance advice. Please see specific information pulled into the AVS if appropriate.     Follow Up   Return in about 4 weeks (around 3/4/2025) for followup  diabetes .          This document has been electronically signed by Lachelle Park DO   February 4, 2025 12:13 EST    Dictated Utilizing Dragon Dictation: Part of this note may be an electronic transcription/translation of spoken language to printed text using the Dragon Dictation System.    Lachelle Park D.O.  [facility]

## 2025-02-09 DIAGNOSIS — E11.65 TYPE 2 DIABETES MELLITUS WITH HYPERGLYCEMIA, WITHOUT LONG-TERM CURRENT USE OF INSULIN: ICD-10-CM

## 2025-02-10 RX ORDER — GLUCOSAMINE HCL/CHONDROITIN SU 500-400 MG
CAPSULE ORAL
Qty: 100 EACH | Refills: 5 | Status: SHIPPED | OUTPATIENT
Start: 2025-02-10

## 2025-02-18 DIAGNOSIS — I10 PRIMARY HYPERTENSION: ICD-10-CM

## 2025-02-18 DIAGNOSIS — I25.10 CORONARY ARTERY DISEASE INVOLVING NATIVE CORONARY ARTERY OF NATIVE HEART WITHOUT ANGINA PECTORIS: ICD-10-CM

## 2025-02-18 DIAGNOSIS — I25.10 CORONARY ARTERY DISEASE INVOLVING NATIVE CORONARY ARTERY OF NATIVE HEART WITHOUT ANGINA PECTORIS: Chronic | ICD-10-CM

## 2025-02-18 RX ORDER — METFORMIN HYDROCHLORIDE 500 MG/1
1000 TABLET, EXTENDED RELEASE ORAL
Qty: 180 TABLET | Refills: 0 | Status: SHIPPED | OUTPATIENT
Start: 2025-02-18

## 2025-02-18 RX ORDER — ROSUVASTATIN CALCIUM 5 MG/1
5 TABLET, COATED ORAL DAILY
Qty: 90 TABLET | Refills: 0 | Status: SHIPPED | OUTPATIENT
Start: 2025-02-18

## 2025-02-18 RX ORDER — METOPROLOL SUCCINATE 100 MG/1
100 TABLET, EXTENDED RELEASE ORAL DAILY
Qty: 90 TABLET | Refills: 0 | Status: SHIPPED | OUTPATIENT
Start: 2025-02-18

## 2025-02-26 DIAGNOSIS — F41.9 ANXIETY AND DEPRESSION: ICD-10-CM

## 2025-02-26 DIAGNOSIS — F32.A ANXIETY AND DEPRESSION: ICD-10-CM

## 2025-02-26 RX ORDER — BUPROPION HYDROCHLORIDE 300 MG/1
300 TABLET ORAL DAILY
Qty: 90 TABLET | Refills: 0 | Status: SHIPPED | OUTPATIENT
Start: 2025-02-26

## 2025-03-04 ENCOUNTER — OFFICE VISIT (OUTPATIENT)
Dept: FAMILY MEDICINE CLINIC | Facility: CLINIC | Age: 50
End: 2025-03-04
Payer: COMMERCIAL

## 2025-03-04 VITALS
TEMPERATURE: 98.2 F | BODY MASS INDEX: 30.3 KG/M2 | DIASTOLIC BLOOD PRESSURE: 90 MMHG | HEART RATE: 68 BPM | WEIGHT: 216.4 LBS | SYSTOLIC BLOOD PRESSURE: 130 MMHG | HEIGHT: 71 IN

## 2025-03-04 DIAGNOSIS — E11.65 TYPE 2 DIABETES MELLITUS WITH HYPERGLYCEMIA, WITH LONG-TERM CURRENT USE OF INSULIN: Primary | ICD-10-CM

## 2025-03-04 DIAGNOSIS — Z79.4 TYPE 2 DIABETES MELLITUS WITH HYPERGLYCEMIA, WITH LONG-TERM CURRENT USE OF INSULIN: Primary | ICD-10-CM

## 2025-03-04 DIAGNOSIS — I10 PRIMARY HYPERTENSION: ICD-10-CM

## 2025-03-04 PROCEDURE — 99214 OFFICE O/P EST MOD 30 MIN: CPT | Performed by: FAMILY MEDICINE

## 2025-03-04 RX ORDER — LANCETS
EACH MISCELLANEOUS
COMMUNITY
Start: 2025-02-05

## 2025-03-04 RX ORDER — METOPROLOL SUCCINATE 50 MG/1
50 TABLET, EXTENDED RELEASE ORAL DAILY
Qty: 90 TABLET | Refills: 0 | Status: SHIPPED | OUTPATIENT
Start: 2025-03-04

## 2025-03-04 NOTE — PROGRESS NOTES
Subjective     Chief Complaint  Hypertension (In the afternoon has been an extra taking Metoprolol 50 mg/Also requesting referral to Endo)    Subjective          Terrence James is a 49 y.o. male who presents today to [facility] for follow up.    HPI:   History of Present Illness    History of Present Illness  The patient is a 49-year-old male who presents today to discuss hypertension, diabetes, and pancreatic pain.    He reports experiencing erratic blood glucose levels, with readings as high as 250 even in the absence of food intake. He has observed that his evening insulin dose appears to be effective, as his morning blood glucose levels remain within normal range even after midnight snacking. His wife has expressed interest in a referral to an endocrinologist for potential insulin pump therapy or continuous glucose monitoring due to the unpredictable nature of his blood glucose levels. His blood glucose level this morning was 117, but it was 251 the previous night despite not consuming any unhealthy food. He has requested a prior authorization for Jardiance or additional samples, as he only has one bottle remaining. He has already refilled his nighttime insulin prescription, which he finds beneficial as it does not cause any issues upon waking.    He has noticed that his blood pressure tends to be slightly elevated before bedtime, leading him to self-administer leftover 50 mg metoprolol at around 8:00 or 9:00 PM, which he finds helpful. He monitors his blood pressure 2 to 3 times daily. He has requested refills for his blood pressure medications. His heart rate has been consistently in the 60s to low 70s, and he reports feeling well without any symptoms of lethargy or weakness. He does not believe his heart rate is excessively low. He feels that the metoprolol's effects diminish as the day progresses.    He continues to experience occasional pancreatic pain, typically  triggered by certain beverages such as Coke. The pain is severe but transient, lasting only 5 to 6 seconds. He is under the care of Dr. Leggett, a gastroenterologist, and is scheduled for a follow-up visit in 6 months. During his last visit to [facility], he was admitted for the weekend for stent removal from his liver and pancreas. However, the procedure was unsuccessful, and a larger stent was placed, which he did not tolerate well. Consequently, the larger stent was removed and replaced with a smaller one.    MEDICATIONS  Current: Metoprolol, amlodipine, valsartan, insulin, Jardiance      The following portions of the patient's history were reviewed and updated as appropriate: allergies, current medications, past family history, past medical history, past social history, past surgical history and problem list.    Objective     Objective     Allergy:   No Known Allergies     Current Medications:   Current Outpatient Medications   Medication Sig Dispense Refill    Accu-Chek Softclix Lancets lancets       Alcohol Swabs 70 % pads Test daily before all meals/snacks and once before bedtime. 100 each 5    amLODIPine-valsartan (EXFORGE) 5-160 MG per tablet Take 1 tablet by mouth Daily. 90 tablet 0    B-D UF III MINI PEN NEEDLES 31G X 5 MM misc Use 1 package 4 (Four) Times a Day. 180 each 0    Blood Glucose Monitoring Suppl (Accu-Chek Mary Plus) w/Device kit Test daily before all meals/snacks and once before bedtime. 1 kit 0    buPROPion XL (WELLBUTRIN XL) 300 MG 24 hr tablet TAKE 1 TABLET BY MOUTH DAILY 90 tablet 0    Eliquis 5 MG tablet tablet Take 1 tablet by mouth Every 12 (Twelve) Hours.      empagliflozin (JARDIANCE) 25 MG tablet tablet Take 1 tablet by mouth Daily. 90 tablet 0    glucose blood (Accu-Chek Mary) test strip Test daily before all meals/snacks and once before bedtime. 3 each 0    insulin aspart (NovoLOG FlexPen) 100 UNIT/ML solution pen-injector sc pen Inject 3-7 units subcutaneously three  times daily with meals 3 mL 2    Insulin Glargine (LANTUS SOLOSTAR) 100 UNIT/ML injection pen Inject 20 Units under the skin into the appropriate area as directed Every Night. 3 mL 1    Lancets Misc. kit Use 1 each 2 (Two) Times a Day. 180 each 0    metFORMIN ER (GLUCOPHAGE-XR) 500 MG 24 hr tablet TAKE 2 TABLETS BY MOUTH DAILY WITH BREAKFAST 180 tablet 0    metoprolol succinate XL (TOPROL-XL) 100 MG 24 hr tablet TAKE 1 TABLET BY MOUTH DAILY 90 tablet 0    omeprazole (priLOSEC) 40 MG capsule Take 1 capsule by mouth Daily. 30 capsule 5    oxyCODONE (ROXICODONE) 5 MG immediate release tablet       rosuvastatin (CRESTOR) 5 MG tablet TAKE 1 TABLET BY MOUTH DAILY 90 tablet 0    metoprolol succinate XL (Toprol XL) 50 MG 24 hr tablet Take 1 tablet by mouth Daily. In the afternoon, In addition to 100 mg daily 90 tablet 0    sildenafil (VIAGRA) 25 MG tablet TAKE 1 TO 4 TABLETS BY MOUTH ONCE DAILY AS NEEDED FOR ERECTILE DYSFUNCTION 1 TO 2 HOURS PRIOR TO SEXUAL ACTIVITY       No current facility-administered medications for this visit.       Past Medical History:  Past Medical History:   Diagnosis Date    Anxiety     COVID-19 08/2021    DVT (deep venous thrombosis)     LEFT LEG     Hypertension     Wears reading eyeglasses        Past Surgical History:  Past Surgical History:   Procedure Laterality Date    CARDIAC CATHETERIZATION  08/2019    CORONARY STENT PLACEMENT  08/2019    EAR TUBES Bilateral 2012    FEMORAL THROMBECTOMY/EMBOLECTOMY Left 10/20/2021    Procedure: ULTRASOUND GUIDED ACCESS; LEFT LEG VENOGRAM; INTRAVENOUS ULTRASOUND OF FEMORAL VEIN, POPLITEAL VEIN, EXTERNAL AND COMMON ILIAC VEINS; LEFT POPLITEAL VEIN ANGIOPLASTY; LEFT COMMON ILIAC VEIN ANGIOPLASTY; POPLITEAL AND FEMORAL VEIN THROMBECTOMY - LEFT;  Surgeon: Arnel Gordon MD;  Location: [facility];  Service: Vascular;  Laterality: Left;  FLUORO:3 MIN 48 SEC  DOSE: 45 MGY  CONTRAS    GASTRIC SLEEVE LAPAROSCOPIC  03/2014    LUMBAR DISCECTOMY  2006    L3-4     "VASECTOMY         Social History:  Social History     Socioeconomic History    Marital status: Single   Tobacco Use    Smoking status: Former     Current packs/day: 0.00     Average packs/day: 0.3 packs/day for 22.0 years (5.5 ttl pk-yrs)     Types: Cigarettes     Start date:      Quit date:      Years since quittin.1     Passive exposure: Never    Smokeless tobacco: Never   Vaping Use    Vaping status: Never Used   Substance and Sexual Activity    Alcohol use: Yes     Comment: ~2-3 DRINK WEEKLY     Drug use: Never    Sexual activity: Yes     Partners: Female     Comment: 5-6 female partners in the last year.       Family History:  Family History   Problem Relation Age of Onset    Vision loss Mother     Diabetes Mother     Kidney disease Mother     Skin cancer Mother         squamous cell    Heart attack Father     No Known Problems Sister     No Known Problems Sister     No Known Problems Brother     Other Maternal Grandmother         unknown    Other Maternal Grandfather         unknown    Other Paternal Grandmother         unknown    Other Paternal Grandfather         unknown    No Known Problems Daughter     No Known Problems Son          Vital Signs:   /90   Pulse 68   Temp 98.2 °F (36.8 °C) (Infrared)   Ht 180.3 cm (70.98\")   Wt 98.2 kg (216 lb 6.4 oz)   BMI 30.20 kg/m²      Physical Exam:  Physical Exam  Vitals reviewed.   Constitutional:       Appearance: He is not ill-appearing.   Cardiovascular:      Rate and Rhythm: Normal rate.      Pulses: Normal pulses.   Pulmonary:      Effort: Pulmonary effort is normal.      Breath sounds: Normal breath sounds.   Neurological:      General: No focal deficit present.      Mental Status: He is alert. Mental status is at baseline.   Psychiatric:         Mood and Affect: Mood normal.         Behavior: Behavior normal.         Thought Content: Thought content normal.         Judgment: Judgment normal.             PHQ-9 Score  PHQ-9 Total Score:  "     Lab Review  Lab on 02/04/2025   Component Date Value Ref Range Status    Glucose 02/04/2025 211 (H)  65 - 99 mg/dL Final    BUN 02/04/2025 14  6 - 20 mg/dL Final    Creatinine 02/04/2025 1.32 (H)  0.76 - 1.27 mg/dL Final    Sodium 02/04/2025 137  136 - 145 mmol/L Final    Potassium 02/04/2025 4.3  3.5 - 5.2 mmol/L Final    Chloride 02/04/2025 96 (L)  98 - 107 mmol/L Final    CO2 02/04/2025 26.1  22.0 - 29.0 mmol/L Final    Calcium 02/04/2025 9.4  8.6 - 10.5 mg/dL Final    Total Protein 02/04/2025 7.5  6.0 - 8.5 g/dL Final    Albumin 02/04/2025 3.8  3.5 - 5.2 g/dL Final    ALT (SGPT) 02/04/2025 24  1 - 41 U/L Final    AST (SGOT) 02/04/2025 29  1 - 40 U/L Final    Alkaline Phosphatase 02/04/2025 110  39 - 117 U/L Final    Total Bilirubin 02/04/2025 0.6  0.0 - 1.2 mg/dL Final    Globulin 02/04/2025 3.7  gm/dL Final    A/G Ratio 02/04/2025 1.0  g/dL Final    BUN/Creatinine Ratio 02/04/2025 10.6  7.0 - 25.0 Final    Anion Gap 02/04/2025 14.9  5.0 - 15.0 mmol/L Final    eGFR 02/04/2025 65.7  >60.0 mL/min/1.73 Final    Hemoglobin A1C 02/04/2025 13.00 (H)  4.80 - 5.60 % Final    Total Cholesterol 02/04/2025 127  0 - 200 mg/dL Final    Triglycerides 02/04/2025 138  0 - 150 mg/dL Final    HDL Cholesterol 02/04/2025 45  40 - 60 mg/dL Final    LDL Cholesterol  02/04/2025 58  0 - 100 mg/dL Final    VLDL Cholesterol 02/04/2025 24  5 - 40 mg/dL Final    LDL/HDL Ratio 02/04/2025 1.21   Final    Microalbumin/Creatinine Ratio 02/04/2025 39.0 (H)  0.0 - 29.0 mg/g Final    Creatinine, Urine 02/04/2025 61.5  mg/dL Final    Microalbumin, Urine 02/04/2025 2.4  mg/dL Final    TSH 02/04/2025 1.690  0.270 - 4.200 uIU/mL Final    WBC 02/04/2025 10.13  3.40 - 10.80 10*3/mm3 Final    RBC 02/04/2025 4.50  4.14 - 5.80 10*6/mm3 Final    Hemoglobin 02/04/2025 12.3 (L)  13.0 - 17.7 g/dL Final    Hematocrit 02/04/2025 37.9  37.5 - 51.0 % Final    MCV 02/04/2025 84.2  79.0 - 97.0 fL Final    MCH 02/04/2025 27.3  26.6 - 33.0 pg Final    MCHC  02/04/2025 32.5  31.5 - 35.7 g/dL Final    RDW 02/04/2025 19.7 (H)  12.3 - 15.4 % Final    RDW-SD 02/04/2025 58.3 (H)  37.0 - 54.0 fl Final    MPV 02/04/2025 11.6  6.0 - 12.0 fL Final    Platelets 02/04/2025 254  140 - 450 10*3/mm3 Final    Neutrophil % 02/04/2025 71.7  42.7 - 76.0 % Final    Lymphocyte % 02/04/2025 19.9  19.6 - 45.3 % Final    Monocyte % 02/04/2025 7.7  5.0 - 12.0 % Final    Eosinophil % 02/04/2025 0.1 (L)  0.3 - 6.2 % Final    Basophil % 02/04/2025 0.3  0.0 - 1.5 % Final    Immature Grans % 02/04/2025 0.3  0.0 - 0.5 % Final    Neutrophils, Absolute 02/04/2025 7.26 (H)  1.70 - 7.00 10*3/mm3 Final    Lymphocytes, Absolute 02/04/2025 2.02  0.70 - 3.10 10*3/mm3 Final    Monocytes, Absolute 02/04/2025 0.78  0.10 - 0.90 10*3/mm3 Final    Eosinophils, Absolute 02/04/2025 0.01  0.00 - 0.40 10*3/mm3 Final    Basophils, Absolute 02/04/2025 0.03  0.00 - 0.20 10*3/mm3 Final    Immature Grans, Absolute 02/04/2025 0.03  0.00 - 0.05 10*3/mm3 Final    nRBC 02/04/2025 0.0  0.0 - 0.2 /100 WBC Final   Admission on 01/27/2025, Discharged on 01/27/2025   Component Date Value Ref Range Status    Expiration Date 01/27/2025 3/6/27   Final    Lot Number 01/27/2025 [id number]   Final    Internal Control 01/27/2025 Passed   Final    Rapid Strep A Screen 01/27/2025 Negative   Final    SARS Antigen 01/27/2025 Not Detected  Not Detected, Presumptive Negative Final    Influenza A Antigen NIKKIE 01/27/2025 Not Detected  Not Detected Final    Influenza B Antigen NIKKIE 01/27/2025 Not Detected  Not Detected Final    Internal Control 01/27/2025 Passed  Passed Final    Lot Number 01/27/2025 [id number]   Final    Expiration Date 01/27/2025 11/14/25   Final   Office Visit on 01/23/2025   Component Date Value Ref Range Status    Hemoglobin A1C 01/23/2025 12.6 (A)  4.5 - 5.7 % Final    Lot Number 01/23/2025 [id number]   Final    Expiration Date 01/23/2025 10/18/2026   Final        Radiology Results        Assessment / Plan         Assessment  and Plan   Diagnoses and all orders for this visit:    1. Type 2 diabetes mellitus with hyperglycemia, with long-term current use of insulin (Primary)  Assessment & Plan:  Significant changes to diabetic regimen made in office today.  Discussed them in detail with patient.  He is continue Jardiance 25 mg daily for renal protection and cardiovascular risk prevention.  Continue metformin 1000 mg daily.    Continue Lantus 20 units nightly and NovoLog sliding scale with meals.  Referring patient to Endocrinology    Orders:  -     Ambulatory Referral to Endocrinology  -     empagliflozin (JARDIANCE) 25 MG tablet tablet; Take 1 tablet by mouth Daily.  Dispense: 90 tablet; Refill: 0    2. Primary hypertension  Assessment & Plan:  Hypertension is stable and controlled  Medication changes per orders.  Dietary sodium restriction.  Weight loss.  Regular aerobic exercise.  Ambulatory blood pressure monitoring.  Blood pressure will be reassessed in 3 months.    Continue Amlodipine 5 mg-Valsartan 160 mg, continue metoprolol to 100 mg daily and 50 mg in the afternoon as he has been taking.     Orders:  -     metoprolol succinate XL (Toprol XL) 50 MG 24 hr tablet; Take 1 tablet by mouth Daily. In the afternoon, In addition to 100 mg daily  Dispense: 90 tablet; Refill: 0        Assessment & Plan  1. Diabetes Mellitus.  His blood glucose levels have been inconsistent despite being on an adequate insulin regimen. He reports that his blood sugar was 117 this morning but 251 last night without significant dietary changes. A referral to an endocrinologist will be initiated for further evaluation and potential insulin pump therapy. He is advised to continue his current insulin regimen and monitor his blood sugar levels closely. A prior authorization for Jardiance will be submitted, and samples will be provided     2. Hypertension.  He reports that his blood pressure is slightly elevated before bedtime. He has been self-medicating with  leftover 50 mg metoprolol at night, which seems to help. His heart rate has been consistently in the 60s to low 70s. He is advised to continue monitoring his heart rate and blood pressure. A refill for metoprolol 50 mg will be provided. He is also on amlodipine/valsartan 5/160 mg.    3. Pancreatic Pain.  He reports occasional pancreatic pain, especially when consuming certain beverages. He has a follow-up appointment with his gastroenterologist, Dr. Leggett, in 6 months. He is advised to avoid beverages that trigger his symptoms and to follow up with Dr. Leggett as scheduled.          Discussed possible differential diagnoses, testing, treatment, recommended non-pharmacological interventions, risks, warning signs to monitor for that would indicate need for follow-up in clinic or ER. If no improvement with these regimens or you have new or worsening symptoms follow-up. Patient verbalizes understanding and agreement with plan of care. Denies further needs or concerns.     Patient was given instructions and counseling regarding her condition and for health maintenance advised.      Health Maintenance  Health Maintenance:   Health Maintenance Due   Topic Date Due    DIABETIC FOOT EXAM  Never done    DIABETIC EYE EXAM  Never done    ANNUAL PHYSICAL  08/17/2024    Hepatitis B (2 of 3 - 19+ 3-dose series) 10/25/2024        Meds ordered during this visit  New Medications Ordered This Visit   Medications    empagliflozin (JARDIANCE) 25 MG tablet tablet     Sig: Take 1 tablet by mouth Daily.     Dispense:  90 tablet     Refill:  0    metoprolol succinate XL (Toprol XL) 50 MG 24 hr tablet     Sig: Take 1 tablet by mouth Daily. In the afternoon, In addition to 100 mg daily     Dispense:  90 tablet     Refill:  0       Meds stopped during this visit:  Medications Discontinued During This Encounter   Medication Reason    empagliflozin (JARDIANCE) 25 MG tablet tablet Reorder        Visit Diagnoses    ICD-10-CM ICD-9-CM   1. Type 2  diabetes mellitus with hyperglycemia, with long-term current use of insulin  E11.65 250.00    Z79.4 790.29     V58.67   2. Primary hypertension  I10 401.9       Patient was given instructions and counseling regarding his condition or for health maintenance advice. Please see specific information pulled into the AVS if appropriate.     Follow Up   Return in about 3 months (around 6/4/2025) for followup Diabetes, HTN, HLD .      Patient or patient representative verbalized consent for the use of Ambient Listening during the visit with  Lachelle Park DO for chart documentation. 3/4/2025  09:40 EST      This document has been electronically signed by Lachelle Park DO   March 4, 2025 09:23 EST    Dictated Utilizing Dragon Dictation: Part of this note may be an electronic transcription/translation of spoken language to printed text using the Dragon Dictation System.    Lachelle Park D.O.  [facility] [place]

## 2025-03-04 NOTE — ASSESSMENT & PLAN NOTE
Significant changes to diabetic regimen made in office today.  Discussed them in detail with patient.  He is continue Jardiance 25 mg daily for renal protection and cardiovascular risk prevention.  Continue metformin 1000 mg daily.    Continue Lantus 20 units nightly and NovoLog sliding scale with meals.  Referring patient to Endocrinology

## 2025-03-04 NOTE — ASSESSMENT & PLAN NOTE
Hypertension is stable and controlled  Medication changes per orders.  Dietary sodium restriction.  Weight loss.  Regular aerobic exercise.  Ambulatory blood pressure monitoring.  Blood pressure will be reassessed in 3 months.    Continue Amlodipine 5 mg-Valsartan 160 mg, continue metoprolol to 100 mg daily and 50 mg in the afternoon as he has been taking.

## 2025-03-11 RX ORDER — APIXABAN 5 MG/1
5 TABLET, FILM COATED ORAL EVERY 12 HOURS SCHEDULED
Qty: 60 TABLET | OUTPATIENT
Start: 2025-03-11

## 2025-03-14 NOTE — PROGRESS NOTES
Subjective:     Encounter Date:03/17/2025    Primary Care Physician: Luis Armando Katz MD      Patient ID: Terrence James is a 49 y.o. male.    Chief Complaint:Coronary Artery Disease and Hypertension    PROBLEM LIST:  Coronary disease  8/2019 [facility] 3 x 15 resolute Ivan SAHRA to proximal LAD.  iFR was 0.77.  Hypertension  Dyslipidemia  Diabetes Type 3C  History of DVT  Anxiety  History of pancreatitis   Surgeries:  Vasectomy  Lumbar discectomy  Gastric sleeve  Venous femoral thrombectomy/embolectomy, Dr. Gordon  Ear tubes      No Known Allergies      Current Outpatient Medications:     Accu-Chek Softclix Lancets lancets, , Disp: , Rfl:     Alcohol Swabs 70 % pads, Test daily before all meals/snacks and once before bedtime., Disp: 100 each, Rfl: 5    amLODIPine-valsartan (EXFORGE) 5-160 MG per tablet, Take 1 tablet by mouth Daily., Disp: 90 tablet, Rfl: 0    B-D UF III MINI PEN NEEDLES 31G X 5 MM misc, Use 1 package 4 (Four) Times a Day., Disp: 180 each, Rfl: 0    Blood Glucose Monitoring Suppl (Accu-Chek Mary Plus) w/Device kit, Test daily before all meals/snacks and once before bedtime., Disp: 1 kit, Rfl: 0    buPROPion XL (WELLBUTRIN XL) 300 MG 24 hr tablet, TAKE 1 TABLET BY MOUTH DAILY, Disp: 90 tablet, Rfl: 0    Eliquis 5 MG tablet tablet, Take 1 tablet by mouth Every 12 (Twelve) Hours., Disp: , Rfl:     empagliflozin (JARDIANCE) 25 MG tablet tablet, Take 1 tablet by mouth Daily., Disp: 90 tablet, Rfl: 0    glucose blood (Accu-Chek Mary) test strip, Test daily before all meals/snacks and once before bedtime., Disp: 3 each, Rfl: 0    insulin aspart (NovoLOG FlexPen) 100 UNIT/ML solution pen-injector sc pen, Inject 3-7 units subcutaneously three times daily with meals, Disp: 3 mL, Rfl: 2    Insulin Glargine (LANTUS SOLOSTAR) 100 UNIT/ML injection pen, Inject 20 Units under the skin into the appropriate area as directed Every Night., Disp: 3 mL, Rfl: 1    Lancets Misc. kit, Use 1 each 2 (Two) Times a Day., Disp: 180  each, Rfl: 0    metFORMIN ER (GLUCOPHAGE-XR) 500 MG 24 hr tablet, TAKE 2 TABLETS BY MOUTH DAILY WITH BREAKFAST, Disp: 180 tablet, Rfl: 0    metoprolol succinate XL (Toprol XL) 50 MG 24 hr tablet, Take 1 tablet by mouth Daily. In the afternoon, In addition to 100 mg daily, Disp: 90 tablet, Rfl: 0    metoprolol succinate XL (TOPROL-XL) 100 MG 24 hr tablet, TAKE 1 TABLET BY MOUTH DAILY, Disp: 90 tablet, Rfl: 0    rosuvastatin (CRESTOR) 5 MG tablet, TAKE 1 TABLET BY MOUTH DAILY, Disp: 90 tablet, Rfl: 0    sildenafil (VIAGRA) 25 MG tablet, TAKE 1 TO 4 TABLETS BY MOUTH ONCE DAILY AS NEEDED FOR ERECTILE DYSFUNCTION 1 TO 2 HOURS PRIOR TO SEXUAL ACTIVITY, Disp: , Rfl:         History of Present Illness    Patient is a 49-year-old  male who presents today for establishment of cardiac care.  Patient has previous history of coronary artery disease with previous reported LAD stenting back in 2019.  Since that time he has not really had significant cardiology follow-up.  He denies any chest pain or shortness of breath.  No reported syncope, presyncope.  Notes given his history he was encouraged by his primary care physician to establish care.    The following portions of the patient's history were reviewed and updated as appropriate: allergies, current medications, past family history, past medical history, past social history, past surgical history and problem list.    Family History   Problem Relation Age of Onset    Vision loss Mother     Diabetes Mother     Kidney disease Mother     Skin cancer Mother         squamous cell    Heart attack Father     No Known Problems Sister     No Known Problems Sister     No Known Problems Brother     Other Maternal Grandmother         unknown    Other Maternal Grandfather         unknown    Other Paternal Grandmother         unknown    Other Paternal Grandfather         unknown    No Known Problems Daughter     No Known Problems Son        Social History     Tobacco Use    Smoking  "status: Former     Current packs/day: 0.00     Average packs/day: 0.3 packs/day for 22.0 years (5.5 ttl pk-yrs)     Types: Cigarettes     Start date:      Quit date:      Years since quittin.2     Passive exposure: Never    Smokeless tobacco: Never   Vaping Use    Vaping status: Never Used   Substance Use Topics    Alcohol use: Yes     Comment: ~2-3 DRINK WEEKLY     Drug use: Never         Review of Systems   Constitutional: Positive for diaphoresis. Negative for fever and malaise/fatigue.   HENT:  Negative for nosebleeds.    Eyes:  Negative for redness and visual disturbance.   Cardiovascular:  Negative for orthopnea, palpitations and paroxysmal nocturnal dyspnea.   Respiratory:  Negative for cough, snoring, sputum production and wheezing.    Hematologic/Lymphatic: Negative for bleeding problem.   Skin:  Negative for flushing, itching and rash.   Musculoskeletal:  Positive for joint pain. Negative for falls and muscle cramps.   Gastrointestinal:  Positive for abdominal pain. Negative for diarrhea, heartburn, nausea and vomiting.   Genitourinary:  Negative for hematuria.   Neurological:  Negative for excessive daytime sleepiness, dizziness, headaches, tremors and weakness.   Psychiatric/Behavioral:  Negative for substance abuse. The patient is not nervous/anxious.           Objective:   /78 (BP Location: Right arm, Patient Position: Sitting)   Pulse 79   Ht 180.3 cm (71\")   Wt 99.1 kg (218 lb 6.4 oz)   SpO2 95%   BMI 30.46 kg/m²         Vitals reviewed.   Constitutional:       Appearance: Healthy appearance. Well-developed and not in distress.   Eyes:      Conjunctiva/sclera: Conjunctivae normal.      Pupils: Pupils are equal, round, and reactive to light.   HENT:      Head: Normocephalic and atraumatic.    Mouth/Throat:      Pharynx: Oropharynx is clear.   Neck:      Thyroid: Thyroid normal. No thyromegaly.      Vascular: Normal carotid pulses. No carotid bruit or JVD. JVD normal.      " Lymphadenopathy: No cervical adenopathy.   Pulmonary:      Effort: No respiratory distress.      Breath sounds: No wheezing. No rales.   Chest:      Chest wall: Not tender to palpatation.   Cardiovascular:      Normal rate. Regular rhythm.      No gallop.    Pulses:     Carotid: 2+ bilaterally.     Dorsalis pedis: 2+ bilaterally.     Posterior tibial: 2+ bilaterally.  Edema:     Peripheral edema absent.   Abdominal:      General: There is no distension or abdominal bruit.      Palpations: There is no abdominal mass.      Tenderness: There is no abdominal tenderness. There is no rebound.   Musculoskeletal:         General: No tenderness or deformity.      Extremities: No clubbing present.Skin:     General: Skin is warm and dry. There is no cyanosis.      Findings: No rash.   Neurological:      Mental Status: Alert, oriented to person, place, and time and oriented to person, place and time.           ECG 12 Lead    Date/Time: 3/17/2025 2:24 PM  Performed by: Kemal Amador MD    Authorized by: Kemal Amador MD  Comparison: compared with previous ECG from 6/21/2024  Similar to previous ECG  Rhythm: sinus rhythm  Other findings: left ventricular hypertrophy                Assessment:   Assessment & Plan      Diagnoses and all orders for this visit:    1. Coronary artery disease involving native coronary artery of native heart without angina pectoris (Primary)  -     ECG 12 Lead      1.  Coronary artery disease, 6 years status post mid LAD stent.  Recurrent dyspnea functional class II.  Note antecedent stent symptoms were also dyspnea.  2.  Diabetes, recently poorly controlled with A1c of 13.  Awaiting endocrinologist evaluation  3.  Dyslipidemia last LDL controlled at 58  4.  Hypertension well-controlled    Recommendations:  1.  Discussed need for optimal glycemic control for long-term cardiovascular health.  2.  Discussed lifestyle changes including exercise therapy  3.  Continue current medical therapy  4.  Check  his GXT MPS at patient's earliest convenience given 6 years post revascularization, and recurrent symptoms.  5.  Rec visit annually or as needed symptoms       Jie BALDERAS scribed portions of this dictation for Dr. Kemal Amador.  I have seen and examined the patient, I have reviewed the note, discussed the case with the advance practice clinician, made necessary changes and I agree with the final note.    Kemal Amador MD  03/17/25  16:36 EDT              Dictated utilizing Dragon dictation

## 2025-03-17 ENCOUNTER — OFFICE VISIT (OUTPATIENT)
Dept: CARDIOLOGY | Facility: CLINIC | Age: 50
End: 2025-03-17
Payer: COMMERCIAL

## 2025-03-17 VITALS
HEART RATE: 79 BPM | SYSTOLIC BLOOD PRESSURE: 110 MMHG | BODY MASS INDEX: 30.57 KG/M2 | WEIGHT: 218.4 LBS | DIASTOLIC BLOOD PRESSURE: 78 MMHG | OXYGEN SATURATION: 95 % | HEIGHT: 71 IN

## 2025-03-17 DIAGNOSIS — I25.10 CORONARY ARTERY DISEASE INVOLVING NATIVE CORONARY ARTERY OF NATIVE HEART WITHOUT ANGINA PECTORIS: Primary | ICD-10-CM

## 2025-03-25 ENCOUNTER — OFFICE VISIT (OUTPATIENT)
Dept: ENDOCRINOLOGY | Facility: CLINIC | Age: 50
End: 2025-03-25
Payer: COMMERCIAL

## 2025-03-25 ENCOUNTER — DOCUMENTATION (OUTPATIENT)
Dept: ENDOCRINOLOGY | Facility: CLINIC | Age: 50
End: 2025-03-25

## 2025-03-25 VITALS
WEIGHT: 221 LBS | SYSTOLIC BLOOD PRESSURE: 112 MMHG | OXYGEN SATURATION: 98 % | DIASTOLIC BLOOD PRESSURE: 70 MMHG | HEIGHT: 71 IN | BODY MASS INDEX: 30.94 KG/M2 | HEART RATE: 70 BPM

## 2025-03-25 DIAGNOSIS — Z79.4 TYPE 2 DIABETES MELLITUS WITH HYPERGLYCEMIA, WITH LONG-TERM CURRENT USE OF INSULIN: Primary | ICD-10-CM

## 2025-03-25 DIAGNOSIS — E11.65 TYPE 2 DIABETES MELLITUS WITH HYPERGLYCEMIA, WITH LONG-TERM CURRENT USE OF INSULIN: Primary | ICD-10-CM

## 2025-03-25 LAB
EXPIRATION DATE: ABNORMAL
GLUCOSE BLDC GLUCOMTR-MCNC: 162 MG/DL (ref 70–130)
Lab: ABNORMAL

## 2025-03-25 PROCEDURE — 99203 OFFICE O/P NEW LOW 30 MIN: CPT | Performed by: PHYSICIAN ASSISTANT

## 2025-03-25 PROCEDURE — 82947 ASSAY GLUCOSE BLOOD QUANT: CPT | Performed by: PHYSICIAN ASSISTANT

## 2025-03-25 RX ORDER — GLUCOSAMINE HCL/CHONDROITIN SU 500-400 MG
CAPSULE ORAL
Qty: 100 EACH | Refills: 5 | Status: SHIPPED | OUTPATIENT
Start: 2025-03-25

## 2025-03-25 RX ORDER — LANCETS
EACH MISCELLANEOUS
Qty: 100 EACH | Refills: 2 | Status: SHIPPED | OUTPATIENT
Start: 2025-03-25 | End: 2025-03-25 | Stop reason: SDUPTHER

## 2025-03-25 RX ORDER — FLURBIPROFEN SODIUM 0.3 MG/ML
1 SOLUTION/ DROPS OPHTHALMIC 4 TIMES DAILY
Qty: 180 EACH | Refills: 0 | Status: SHIPPED | OUTPATIENT
Start: 2025-03-25

## 2025-03-25 RX ORDER — LANCETS
EACH MISCELLANEOUS
Qty: 100 EACH | Refills: 2 | Status: SHIPPED | OUTPATIENT
Start: 2025-03-25

## 2025-03-25 RX ORDER — ACYCLOVIR 400 MG/1
1 TABLET ORAL
Qty: 3 EACH | Refills: 5 | Status: SHIPPED | OUTPATIENT
Start: 2025-03-25

## 2025-03-25 NOTE — PROGRESS NOTES
Pt seen for Dm education. Assisted with Kenny 3 start - paired with clinic. Will liekly need to switch to Dexcom G7, advised if he needs assistance to call office. Reviewed insulin regimen and basics DM physiology. Pt has been working on healthier diet and is motivated to improve glucose levels.  Not made to pull Kenny data in two weeks for review.

## 2025-03-25 NOTE — PROGRESS NOTES
Office Note      Date: 2025  Patient Name: Terrence James  MRN: [id number]  : 1975    Chief Complaint   Patient presents with    Diabetes     Type II with hyperglycemia with long term insulin usage       History of Present Illness:   Terrence James is a 49 y.o. male who presents for Diabetes type 2. Diagnosed in: . Treated in past with oral agents. Current treatments: Jardiance 25 mg, Lantus 20 units, Metformin  mg daily, Novolog prn when he checks blood sugar. Number of insulin shots per day: 4. Checks blood sugar: >4 times per day .  Has low blood sugar: no.     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 Had stent in stomach and liver since then. Had stent changed and it caused recurrence of pancreatitis.     Had A1c of 7.7 in , may have been prescribed metformin at that time.     Administers Novolog when he checks blood sugar 3 times a day. Usually administers before breakfast, does not take it to work; does it when he gets home from work and before bed. Uses sliding scale of 2:50>150      Last A1c:  Hemoglobin A1C   Date Value Ref Range Status   2025 13.00 (H) 4.80 - 5.60 % Final       DM Health Maintenance:  Ophtho: needs to schedule   Monofilament / Foot exam: 3/24/25  Lipids/Statin: taking a statin with last FLP showing LDL 58 25  PRESTON: 39.0 25  TSH: 1.690 25  Aspirin: not taking  ACE/ARB: no    Diabetic Complications:  Eyes: No  Kidneys: No  Feet: No  Heart: No    Subjective        Review of Systems:   Review of Systems   Constitutional:  Negative for activity change, appetite change and fatigue.   Respiratory:  Negative for chest tightness and shortness of breath.    Gastrointestinal:  Negative for abdominal pain.   Musculoskeletal:  Negative for  "myalgias.   Neurological:  Negative for numbness.   Psychiatric/Behavioral:  The patient is not nervous/anxious.        The following portions of the patient's history were reviewed and updated as appropriate: allergies, current medications, past family history, past medical history, past social history, past surgical history, and problem list.    Objective     Visit Vitals  /70 (BP Location: Left arm, Patient Position: Sitting, Cuff Size: Adult)   Pulse 70   Ht 180.3 cm (71\")   Wt 100 kg (221 lb)   SpO2 98%   BMI 30.82 kg/m²           Physical Exam:  Physical Exam  Constitutional:       Appearance: He is well-developed.   HENT:      Head: Normocephalic and atraumatic.      Right Ear: External ear normal.      Left Ear: External ear normal.   Eyes:      Conjunctiva/sclera: Conjunctivae normal.   Cardiovascular:      Pulses:           Dorsalis pedis pulses are 2+ on the right side and 2+ on the left side.        Posterior tibial pulses are 2+ on the right side and 2+ on the left side.   Pulmonary:      Effort: Pulmonary effort is normal.   Musculoskeletal:         General: Normal range of motion.      Cervical back: Normal range of motion.   Feet:      Right foot:      Protective Sensation: 10 sites tested.  10 sites sensed.      Skin integrity: Skin integrity normal.      Toenail Condition: Right toenails are normal.      Left foot:      Protective Sensation: 10 sites tested.  10 sites sensed.      Skin integrity: Skin integrity normal.      Toenail Condition: Left toenails are normal.      Comments: Diabetic Foot Exam Performed and Monofilament Test Performed      Skin:     General: Skin is warm and dry.   Psychiatric:         Behavior: Behavior normal.         Assessment / Plan      Assessment & Plan:    Diagnoses and all orders for this visit:    1. Type 2 diabetes mellitus with hyperglycemia, with long-term current use of insulin (Primary)  Assessment & Plan:  Diabetes is worsening.   Medication changes " per orders.  Recommended an ADA diet.  Regular aerobic exercise.  Discussed foot care.  Diabetes educator referral.    Patient would benefit from CGM monitor to allow for closer monitoring of glucose levels so adjustments can be made in his insulin amounts.  He was started on a sample freestyle carol in office and educated on how to use it.    He has been off Lantus for 3-4 days and will be restarting his previous dose of Lantus 20 units. This has been controlling his fasting blood sugars.    Discussed administering NovoLog prior to eating.  He does eat small amounts at a time, so we will start with a small dose prior to meals.  He is using a sliding scale of 2:50> 150 and can continue to use this for his prandial boluses.  Advised him not to administer NovoLog more often than every 3-4 hours.    Continue Jardiance 25 mg daily and metformin  mg daily.    Discussed that with diabetes 3C, he is at risk for labile blood sugars and potential hypoglycemia.  He should monitor closely for this and discussed rule of 15s with hypoglycemia.    Diabetes will be reassessed in 3 months    Orders:  -     POC Glucose, Blood  -     Cancel: POC Glycosylated Hemoglobin (Hb A1C)  -     Continuous Glucose Sensor (Dexcom G7 Sensor) misc; Use 1 Device Every 10 (Ten) Days.  Dispense: 3 each; Refill: 5  -     Insulin Glargine (LANTUS SOLOSTAR) 100 UNIT/ML injection pen; Inject 20 Units under the skin into the appropriate area as directed Every Night.  Dispense: 3 mL; Refill: 1  -     Alcohol Swabs 70 % pads; Test daily before all meals/snacks and once before bedtime.  Dispense: 100 each; Refill: 5  -     glucose blood (Accu-Chek Mary) test strip; Test daily before all meals/snacks and once before bedtime.  Dispense: 3 each; Refill: 0  -     B-D UF III MINI PEN NEEDLES 31G X 5 MM misc; Use 1 package 4 (Four) Times a Day.  Dispense: 180 each; Refill: 0  -     Ambulatory Referral to Diabetic Education    Other orders  -      Discontinue: Accu-Chek Softclix Lancets lancets; Use as instructed  Dispense: 100 each; Refill: 2          Return in about 3 months (around 6/25/2025) for Follow up.    Portions of this note were completed with voice recognition program.  Electronically signed by Sandrita Beck PA-C  [facility] [place]  03/25/2025

## 2025-03-26 ENCOUNTER — TELEPHONE (OUTPATIENT)
Dept: ENDOCRINOLOGY | Facility: CLINIC | Age: 50
End: 2025-03-26

## 2025-03-26 DIAGNOSIS — E11.65 TYPE 2 DIABETES MELLITUS WITH HYPERGLYCEMIA, WITHOUT LONG-TERM CURRENT USE OF INSULIN: ICD-10-CM

## 2025-03-26 NOTE — ASSESSMENT & PLAN NOTE
Diabetes is worsening.   Medication changes per orders.  Recommended an ADA diet.  Regular aerobic exercise.  Discussed foot care.  Diabetes educator referral.    Patient would benefit from CGM monitor to allow for closer monitoring of glucose levels so adjustments can be made in his insulin amounts.  He was started on a sample freestyle carol in office and educated on how to use it.    He has been off Lantus for 3-4 days and will be restarting his previous dose of Lantus 20 units. This has been controlling his fasting blood sugars.    Discussed administering NovoLog prior to eating.  He does eat small amounts at a time, so we will start with a small dose prior to meals.  He is using a sliding scale of 2:50> 150 and can continue to use this for his prandial boluses.  Advised him not to administer NovoLog more often than every 3-4 hours.    Continue Jardiance 25 mg daily and metformin  mg daily.    Discussed that with diabetes 3C, he is at risk for labile blood sugars and potential hypoglycemia.  He should monitor closely for this and discussed rule of 15s with hypoglycemia.    Diabetes will be reassessed in 3 months

## 2025-03-26 NOTE — TELEPHONE ENCOUNTER
Caller: Terrence James A    Relationship to patient: Self    Best call back number: [phone]    Patient is needing: WANTED TO INFORM HIS SUGARS DROPPED TO THE 20'S LAST NIGHT AND BECAME UNCONSCIOUS, HE IS NOW IN THE ER

## 2025-03-28 ENCOUNTER — OFFICE VISIT (OUTPATIENT)
Dept: ENDOCRINOLOGY | Facility: CLINIC | Age: 50
End: 2025-03-28
Payer: COMMERCIAL

## 2025-03-28 VITALS
DIASTOLIC BLOOD PRESSURE: 108 MMHG | SYSTOLIC BLOOD PRESSURE: 152 MMHG | HEIGHT: 71 IN | OXYGEN SATURATION: 96 % | WEIGHT: 220.8 LBS | BODY MASS INDEX: 30.91 KG/M2 | HEART RATE: 90 BPM

## 2025-03-28 DIAGNOSIS — E11.65 TYPE 2 DIABETES MELLITUS WITH HYPERGLYCEMIA, WITH LONG-TERM CURRENT USE OF INSULIN: Primary | ICD-10-CM

## 2025-03-28 DIAGNOSIS — Z79.4 TYPE 2 DIABETES MELLITUS WITH HYPERGLYCEMIA, WITH LONG-TERM CURRENT USE OF INSULIN: Primary | ICD-10-CM

## 2025-03-28 LAB
EXPIRATION DATE: ABNORMAL
GLUCOSE BLDC GLUCOMTR-MCNC: 223 MG/DL (ref 70–130)
Lab: ABNORMAL

## 2025-03-28 PROCEDURE — 99214 OFFICE O/P EST MOD 30 MIN: CPT | Performed by: PHYSICIAN ASSISTANT

## 2025-03-28 PROCEDURE — 82947 ASSAY GLUCOSE BLOOD QUANT: CPT | Performed by: PHYSICIAN ASSISTANT

## 2025-03-28 RX ORDER — IBUPROFEN 600 MG/1
1 TABLET ORAL
COMMUNITY
Start: 2025-03-26

## 2025-03-28 RX ORDER — NICOTINE POLACRILEX 4 MG
37.5 LOZENGE BUCCAL
COMMUNITY
Start: 2025-03-26

## 2025-03-28 NOTE — PROGRESS NOTES
Office Note      Date: 2025  Patient Name: Terrence James  MRN: [id number]  : 1975    Chief Complaint   Patient presents with    Diabetes     Type III c Diabetes       History of Present Illness:   Terrence James is a 49 y.o. male who presents for Diabetes type 2.   Diagnosed:   Current RX: Jardiance 25 mg, Lantus 20 units, Metformin  mg daily, Novolog     Bg checks are done: continuously with Kenny  Hypoglycemia :severe episode of hypoglycemia requiring ER visit    Patient was here for his initial visit 3 days ago and started on kenny CGM.  It was also recommended that he take his NovoLog before eating at current scale of 2: 50> 150 and did not administer more than every 3-4 hours.  He was also going to restart the Lantus 20 units that he had not taken for 3 or 4 days due to running out of it.    The night of his visit he had a severe hypoglycemic event and was found down on the floor when his significant other was alerted by kenny that he was hypoglycemic.  She called EMS and blood sugar was found to be 20.  He was revived with IV glucose and taken to  ER.  He underwent thorough evaluation and blood sugars did return to normal.  Was discharged that same day.  He had some extensive diabetes education while in the ER, and was educated more thoroughly on different types of insulin and how to administer.      In retrospect the patient may have woken up in the night and seen a high blood sugar of 260 on his CGM and then administered some insulin without eating.  Patient has no memory of the event so it is hard to know what happened.  He did have some alcoholic beverages after dinner and this may have contributed as well.    The day following ER visit, patient did not administer any Humalog.  Just today, before his appointment he administered 2 units of Humalog before his lunch meal.  Blood sugars have been relatively well-controlled without any fast acting insulin.    Last  "A1c:  Hemoglobin A1C   Date Value Ref Range Status   02/04/2025 13.00 (H) 4.80 - 5.60 % Final       Changes in health since last visit: ER visit as described above.     DM Health Maintenance:  Ophtho: needs to schedule   Monofilament / Foot exam: 3/24/25  Lipids/Statin: taking a statin with last FLP showing LDL 58 2/4/25  PRESTON: 39.0 2/4/25  TSH: 1.690 2/4/25  Aspirin: not taking  ACE/ARB: no     Diabetic Complications:  Eyes: No  Kidneys: No  Feet: No  Heart: No      Subjective          Review of Systems:   Review of Systems   Constitutional:  Negative for activity change, appetite change and fatigue.   Respiratory:  Negative for chest tightness and shortness of breath.    Gastrointestinal:  Negative for abdominal pain.   Musculoskeletal:  Negative for myalgias.   Neurological:  Negative for tremors, weakness and numbness.   Psychiatric/Behavioral:  The patient is not nervous/anxious.        The following portions of the patient's history were reviewed and updated as appropriate: allergies, current medications, past family history, past medical history, past social history, past surgical history, and problem list.    Objective     Visit Vitals  BP (!) 152/108 (BP Location: Left arm, Patient Position: Sitting)   Pulse 90   Ht 180.3 cm (70.98\")   Wt 100 kg (220 lb 12.8 oz)   SpO2 96%   BMI 30.81 kg/m²           Physical Exam:  Physical Exam  Constitutional:       Appearance: He is well-developed.   HENT:      Head: Normocephalic and atraumatic.      Right Ear: External ear normal.      Left Ear: External ear normal.   Eyes:      Conjunctiva/sclera: Conjunctivae normal.   Pulmonary:      Effort: Pulmonary effort is normal.   Musculoskeletal:         General: Normal range of motion.      Cervical back: Normal range of motion.   Skin:     General: Skin is warm and dry.   Psychiatric:         Behavior: Behavior normal.          Assessment / Plan      Assessment & Plan:  Diagnoses and all orders for this visit:    1. Type 2 " diabetes mellitus with hyperglycemia, with long-term current use of insulin (Primary)  Assessment & Plan:  Diabetes is improving with treatment.   Medication changes per orders.  Recommended an ADA diet.  Regular aerobic exercise.  Discussed ways to avoid symptomatic hypoglycemia.    Due to significant hypoglycemic event, will have patient discontinue NovoLog for now.  He will continue Lantus 20 units daily, metformin  mg daily and Jardiance 25 mg daily.    Will use CGM to monitor blood sugars closely and he is to reach out if having frequent hypoglycemia or hyperglycemia.  Discussed ways to manage hypoglycemia.  Patient significant other will be connected to his CGM for additional monitoring.  They also have glucagon on hand, should there be another hypoglycemic event.    He has recognized that alcohol use with insulin can be dangerous.  He plans to discontinue all alcohol intake.    Diabetes will be reassessed in 3 months    Orders:  -     POC Glucose, Blood  -     Insulin Glargine (LANTUS SOLOSTAR) 100 UNIT/ML injection pen; Inject 20 Units under the skin into the appropriate area as directed Every Night.  Dispense: 18 mL; Refill: 1  -     glucose blood (Accu-Chek Mary) test strip; Test daily before all meals/snacks and once before bedtime.  Dispense: 200 each; Refill: 1  -     Lancets Misc. kit; Use as directed to monitor blood glucose 4-5 time daily.  Dispense: 200 each; Refill: 1        Return for Next scheduled follow up.    Portions of this note were completed with voice recognition program.  Electronically signed by Sandrita Beck PA-C  [facility] [place]  03/28/2025

## 2025-03-31 NOTE — ASSESSMENT & PLAN NOTE
Diabetes is improving with treatment.   Medication changes per orders.  Recommended an ADA diet.  Regular aerobic exercise.  Discussed ways to avoid symptomatic hypoglycemia.    Due to significant hypoglycemic event, will have patient discontinue NovoLog for now.  He will continue Lantus 20 units daily, metformin  mg daily and Jardiance 25 mg daily.    Will use CGM to monitor blood sugars closely and he is to reach out if having frequent hypoglycemia or hyperglycemia.  Discussed ways to manage hypoglycemia.  Patient significant other will be connected to his CGM for additional monitoring.  They also have glucagon on hand, should there be another hypoglycemic event.    He has recognized that alcohol use with insulin can be dangerous.  He plans to discontinue all alcohol intake.    Diabetes will be reassessed in 3 months

## 2025-04-17 ENCOUNTER — HOSPITAL ENCOUNTER (OUTPATIENT)
Facility: HOSPITAL | Age: 50
Discharge: HOME | End: 2025-04-17
Payer: COMMERCIAL

## 2025-04-22 ENCOUNTER — TRANSCRIBE ORDERS (OUTPATIENT)
Dept: ADMINISTRATIVE | Facility: HOSPITAL | Age: 50
End: 2025-04-22
Payer: COMMERCIAL

## 2025-04-22 DIAGNOSIS — K85.91 ACUTE NECROTIZING PANCREATITIS: Primary | ICD-10-CM

## 2025-05-09 ENCOUNTER — HOSPITAL ENCOUNTER (OUTPATIENT)
Facility: HOSPITAL | Age: 50
Discharge: HOME | End: 2025-05-09
Payer: COMMERCIAL

## 2025-05-21 DIAGNOSIS — I25.10 CORONARY ARTERY DISEASE INVOLVING NATIVE CORONARY ARTERY OF NATIVE HEART WITHOUT ANGINA PECTORIS: ICD-10-CM

## 2025-05-21 RX ORDER — METFORMIN HYDROCHLORIDE 500 MG/1
1000 TABLET, EXTENDED RELEASE ORAL
Qty: 180 TABLET | Refills: 0 | Status: SHIPPED | OUTPATIENT
Start: 2025-05-21

## 2025-05-21 RX ORDER — ROSUVASTATIN CALCIUM 5 MG/1
5 TABLET, COATED ORAL DAILY
Qty: 90 TABLET | Refills: 0 | Status: SHIPPED | OUTPATIENT
Start: 2025-05-21

## 2025-05-29 DIAGNOSIS — F32.A ANXIETY AND DEPRESSION: ICD-10-CM

## 2025-05-29 DIAGNOSIS — F41.9 ANXIETY AND DEPRESSION: ICD-10-CM

## 2025-05-29 RX ORDER — BUPROPION HYDROCHLORIDE 300 MG/1
300 TABLET ORAL DAILY
Qty: 90 TABLET | Refills: 0 | Status: SHIPPED | OUTPATIENT
Start: 2025-05-29

## 2025-05-30 DIAGNOSIS — I10 PRIMARY HYPERTENSION: ICD-10-CM

## 2025-06-01 DIAGNOSIS — I10 PRIMARY HYPERTENSION: ICD-10-CM

## 2025-06-02 RX ORDER — METOPROLOL SUCCINATE 50 MG/1
50 TABLET, EXTENDED RELEASE ORAL DAILY
Qty: 90 TABLET | Refills: 0 | Status: SHIPPED | OUTPATIENT
Start: 2025-06-02

## 2025-06-02 RX ORDER — VALSARTAN 40 MG/1
40 TABLET ORAL DAILY
Qty: 90 TABLET | Refills: 1 | OUTPATIENT
Start: 2025-06-02

## 2025-06-30 ENCOUNTER — PRIOR AUTHORIZATION (OUTPATIENT)
Dept: ENDOCRINOLOGY | Facility: CLINIC | Age: 50
End: 2025-06-30
Payer: COMMERCIAL

## 2025-06-30 NOTE — TELEPHONE ENCOUNTER
Terrence James (Key: [id number])  PA Case ID #: [id number]  Rx #: [id number]  Need Help? Call us at [phone]  Outcome  Approved today by [employer]  PA Case: [id number], Status: Approved, Coverage Starts on: 6/30/2025 12:00:00 AM, Coverage Ends on: 6/30/2026 12:00:00 AM.  Effective Date: 6/30/2025  Authorization Expiration Date: 6/30/2026  Drug  Dexcom G7 Sensor  ePA cloud logo  Form  [employer] Electronic PA Form (2017 [employer])

## 2025-07-02 ENCOUNTER — TELEPHONE (OUTPATIENT)
Dept: ENDOCRINOLOGY | Facility: CLINIC | Age: 50
End: 2025-07-02

## 2025-07-02 NOTE — TELEPHONE ENCOUNTER
Caller: Terrence James    Relationship to patient: Self    Best call back number: [phone]     Patient is needing: PT SPOUSE CALLED IN ASKING FOR OFFICE NOTES FROM PT LAST VISIT PT HAS RECENTLY CHANGED INSURANCE. PT DO NOT HAVE CARDS YET. INSURANCE IS NEEDING TO KNOW WHY PT WAS PRESCRIBED THE DEXCOM. PT SPOUSE WILL CALL BACK TO GIVE THE FAX NUMBER SO THAT THE INFORMATION CAN BE SENT OVER TO THE INSURANCE COMPANY. PLEASE ADVISE .

## 2025-07-02 NOTE — TELEPHONE ENCOUNTER
Spoke with patient.  Advised this was approved by [employer] on 06/30/2025.  States [employer] is new insurance.  Called [employer] and they state it goes through.

## 2025-07-11 DIAGNOSIS — I10 PRIMARY HYPERTENSION: ICD-10-CM

## 2025-07-11 RX ORDER — METOPROLOL SUCCINATE 50 MG/1
TABLET, EXTENDED RELEASE ORAL
Qty: 90 TABLET | Refills: 0 | Status: SHIPPED | OUTPATIENT
Start: 2025-07-11

## 2025-07-28 DIAGNOSIS — I10 PRIMARY HYPERTENSION: ICD-10-CM

## 2025-07-29 RX ORDER — AMLODIPINE AND VALSARTAN 5; 160 MG/1; MG/1
1 TABLET ORAL DAILY
Qty: 90 TABLET | Refills: 1 | Status: SHIPPED | OUTPATIENT
Start: 2025-07-29

## 2025-08-16 DIAGNOSIS — I25.10 CORONARY ARTERY DISEASE INVOLVING NATIVE CORONARY ARTERY OF NATIVE HEART WITHOUT ANGINA PECTORIS: ICD-10-CM

## 2025-08-18 RX ORDER — METFORMIN HYDROCHLORIDE 500 MG/1
1000 TABLET, EXTENDED RELEASE ORAL
Qty: 180 TABLET | Refills: 1 | Status: SHIPPED | OUTPATIENT
Start: 2025-08-18

## 2025-08-18 RX ORDER — ROSUVASTATIN CALCIUM 5 MG/1
5 TABLET, COATED ORAL DAILY
Qty: 90 TABLET | Refills: 0 | Status: SHIPPED | OUTPATIENT
Start: 2025-08-18 | End: 2025-08-21

## 2025-08-21 DIAGNOSIS — F32.A ANXIETY AND DEPRESSION: ICD-10-CM

## 2025-08-21 DIAGNOSIS — F41.9 ANXIETY AND DEPRESSION: ICD-10-CM

## 2025-08-21 DIAGNOSIS — I10 PRIMARY HYPERTENSION: ICD-10-CM

## 2025-08-21 DIAGNOSIS — E11.65 TYPE 2 DIABETES MELLITUS WITH HYPERGLYCEMIA, WITH LONG-TERM CURRENT USE OF INSULIN: ICD-10-CM

## 2025-08-21 DIAGNOSIS — Z79.4 TYPE 2 DIABETES MELLITUS WITH HYPERGLYCEMIA, WITH LONG-TERM CURRENT USE OF INSULIN: ICD-10-CM

## 2025-08-21 DIAGNOSIS — I25.10 CORONARY ARTERY DISEASE INVOLVING NATIVE CORONARY ARTERY OF NATIVE HEART WITHOUT ANGINA PECTORIS: ICD-10-CM

## 2025-08-21 RX ORDER — METOPROLOL SUCCINATE 50 MG/1
TABLET, EXTENDED RELEASE ORAL
Qty: 90 TABLET | Refills: 0 | Status: SHIPPED | OUTPATIENT
Start: 2025-08-21

## 2025-08-21 RX ORDER — BUPROPION HYDROCHLORIDE 300 MG/1
300 TABLET ORAL DAILY
Qty: 90 TABLET | Refills: 0 | Status: SHIPPED | OUTPATIENT
Start: 2025-08-21

## 2025-08-21 RX ORDER — ROSUVASTATIN CALCIUM 5 MG/1
5 TABLET, COATED ORAL DAILY
Qty: 90 TABLET | Refills: 0 | Status: SHIPPED | OUTPATIENT
Start: 2025-08-21

## 2025-08-22 RX ORDER — VALSARTAN 40 MG/1
40 TABLET ORAL DAILY
Qty: 90 TABLET | Refills: 1 | OUTPATIENT
Start: 2025-08-22

## 2025-08-22 RX ORDER — PEN NEEDLE, DIABETIC 31 GX5/16"
NEEDLE, DISPOSABLE MISCELLANEOUS 4 TIMES DAILY
Qty: 100 EACH | OUTPATIENT
Start: 2025-08-22

## (undated) DEVICE — PK ANGIO OR 10

## (undated) DEVICE — KT INTRO MINISTICK MAX W/GW NITNL/TUNG ECHO STFF 4F 21G 7CM

## (undated) DEVICE — BNDG ELAS W/CLIP 6IN 10YD LF STRL

## (undated) DEVICE — DECANTER BAG 9": Brand: MEDLINE INDUSTRIES, INC.

## (undated) DEVICE — BANDAGE,GAUZE,BULKEE II,4.5"X4.1YD,STRL: Brand: MEDLINE

## (undated) DEVICE — KT CATH ASP INDIGO LIGHTNING XTORQ/TP 12F 100CM

## (undated) DEVICE — ULTRAVERSE 035 PTA DILATATION CATHETER 10.0MM X 80MM BALLOON, 130CM SHAFT: Brand: ULTRAVERSE® 035 PTA DILATATION CATHETER

## (undated) DEVICE — INFLATION DEVICE: Brand: ENCORE™ 26

## (undated) DEVICE — GLV SURG BIOGEL LTX PF 7 1/2

## (undated) DEVICE — RADIFOCUS GLIDEWIRE ADVANTAGE GUIDEWIRE: Brand: GLIDEWIRE ADVANTAGE

## (undated) DEVICE — SUCTION CANISTER, 2500CC, RIGID: Brand: DEROYAL

## (undated) DEVICE — ATLAS® GOLD PTA DILATATION CATHETER 16 MM X 40 MM, 120 CM CATHETER: Brand: ATLAS® GOLD

## (undated) DEVICE — SYR CONTRL LUERLOK 10CC

## (undated) DEVICE — CATH IMG IVUS VISIONS .035 DIG 8.2F

## (undated) DEVICE — CVR PROB ULTRASND/TRANSD W/GEL 18X120CM STRL

## (undated) DEVICE — SNAP KOVER: Brand: UNBRANDED

## (undated) DEVICE — ADHS SKIN PREMIERPRO EXOFIN TOPICAL HI/VISC .5ML

## (undated) DEVICE — CVR HNDL LIGHT RIGID

## (undated) DEVICE — INTRO SHEATH DRYSEAL FLEX 12F4TO4.7MM 33CM